# Patient Record
Sex: MALE | Race: BLACK OR AFRICAN AMERICAN | NOT HISPANIC OR LATINO | Employment: UNEMPLOYED | URBAN - METROPOLITAN AREA
[De-identification: names, ages, dates, MRNs, and addresses within clinical notes are randomized per-mention and may not be internally consistent; named-entity substitution may affect disease eponyms.]

---

## 2017-12-17 ENCOUNTER — HOSPITAL ENCOUNTER (EMERGENCY)
Facility: HOSPITAL | Age: 27
Discharge: HOME | End: 2017-12-17
Attending: EMERGENCY MEDICINE
Payer: MEDICAID

## 2017-12-17 VITALS
OXYGEN SATURATION: 97 % | WEIGHT: 115 LBS | HEIGHT: 68 IN | RESPIRATION RATE: 16 BRPM | HEART RATE: 101 BPM | TEMPERATURE: 98 F | BODY MASS INDEX: 17.43 KG/M2 | DIASTOLIC BLOOD PRESSURE: 67 MMHG | SYSTOLIC BLOOD PRESSURE: 110 MMHG

## 2017-12-17 DIAGNOSIS — R06.02 SHORTNESS OF BREATH: ICD-10-CM

## 2017-12-17 DIAGNOSIS — J45.901 EXACERBATION OF ASTHMA, UNSPECIFIED ASTHMA SEVERITY, UNSPECIFIED WHETHER PERSISTENT: Primary | ICD-10-CM

## 2017-12-17 LAB
ALBUMIN SERPL BCP-MCNC: 4.2 G/DL
ALP SERPL-CCNC: 72 U/L
ALT SERPL W/O P-5'-P-CCNC: 16 U/L
ANION GAP SERPL CALC-SCNC: 14 MMOL/L
AST SERPL-CCNC: 23 U/L
BASOPHILS # BLD AUTO: 0.05 K/UL
BASOPHILS NFR BLD: 0.4 %
BILIRUB SERPL-MCNC: 0.4 MG/DL
BNP SERPL-MCNC: 43 PG/ML
BUN SERPL-MCNC: 13 MG/DL
CALCIUM SERPL-MCNC: 9.2 MG/DL
CHLORIDE SERPL-SCNC: 107 MMOL/L
CO2 SERPL-SCNC: 20 MMOL/L
CREAT SERPL-MCNC: 1 MG/DL
DIFFERENTIAL METHOD: ABNORMAL
EOSINOPHIL # BLD AUTO: 0 K/UL
EOSINOPHIL NFR BLD: 0.1 %
ERYTHROCYTE [DISTWIDTH] IN BLOOD BY AUTOMATED COUNT: 12.2 %
EST. GFR  (AFRICAN AMERICAN): >60 ML/MIN/1.73 M^2
EST. GFR  (NON AFRICAN AMERICAN): >60 ML/MIN/1.73 M^2
GLUCOSE SERPL-MCNC: 108 MG/DL
HCT VFR BLD AUTO: 44 %
HGB BLD-MCNC: 15.1 G/DL
IMM GRANULOCYTES # BLD AUTO: 0.04 K/UL
IMM GRANULOCYTES NFR BLD AUTO: 0.4 %
LYMPHOCYTES # BLD AUTO: 0.3 K/UL
LYMPHOCYTES NFR BLD: 2.9 %
MCH RBC QN AUTO: 30.7 PG
MCHC RBC AUTO-ENTMCNC: 34.3 G/DL
MCV RBC AUTO: 89 FL
MONOCYTES # BLD AUTO: 0.7 K/UL
MONOCYTES NFR BLD: 6 %
NEUTROPHILS # BLD AUTO: 10.1 K/UL
NEUTROPHILS NFR BLD: 90.2 %
NRBC BLD-RTO: 0 /100 WBC
PLATELET # BLD AUTO: 198 K/UL
PMV BLD AUTO: 10.4 FL
POTASSIUM SERPL-SCNC: 3.5 MMOL/L
PROT SERPL-MCNC: 7.6 G/DL
RBC # BLD AUTO: 4.92 M/UL
SODIUM SERPL-SCNC: 141 MMOL/L
TROPONIN I SERPL DL<=0.01 NG/ML-MCNC: <0.006 NG/ML
WBC # BLD AUTO: 11.22 K/UL

## 2017-12-17 PROCEDURE — 93005 ELECTROCARDIOGRAM TRACING: CPT

## 2017-12-17 PROCEDURE — 94640 AIRWAY INHALATION TREATMENT: CPT

## 2017-12-17 PROCEDURE — 80053 COMPREHEN METABOLIC PANEL: CPT

## 2017-12-17 PROCEDURE — 83880 ASSAY OF NATRIURETIC PEPTIDE: CPT

## 2017-12-17 PROCEDURE — 25000003 PHARM REV CODE 250: Performed by: PHYSICIAN ASSISTANT

## 2017-12-17 PROCEDURE — 84484 ASSAY OF TROPONIN QUANT: CPT

## 2017-12-17 PROCEDURE — 99284 EMERGENCY DEPT VISIT MOD MDM: CPT | Mod: 25

## 2017-12-17 PROCEDURE — 96360 HYDRATION IV INFUSION INIT: CPT

## 2017-12-17 PROCEDURE — 99285 EMERGENCY DEPT VISIT HI MDM: CPT | Mod: ,,, | Performed by: EMERGENCY MEDICINE

## 2017-12-17 PROCEDURE — 63600175 PHARM REV CODE 636 W HCPCS: Performed by: PHYSICIAN ASSISTANT

## 2017-12-17 PROCEDURE — 25000242 PHARM REV CODE 250 ALT 637 W/ HCPCS: Performed by: PHYSICIAN ASSISTANT

## 2017-12-17 PROCEDURE — 93010 ELECTROCARDIOGRAM REPORT: CPT | Mod: ,,, | Performed by: INTERNAL MEDICINE

## 2017-12-17 PROCEDURE — 85025 COMPLETE CBC W/AUTO DIFF WBC: CPT

## 2017-12-17 RX ORDER — IPRATROPIUM BROMIDE AND ALBUTEROL SULFATE 2.5; .5 MG/3ML; MG/3ML
3 SOLUTION RESPIRATORY (INHALATION)
Status: DISCONTINUED | OUTPATIENT
Start: 2017-12-17 | End: 2017-12-17

## 2017-12-17 RX ORDER — IPRATROPIUM BROMIDE AND ALBUTEROL SULFATE 2.5; .5 MG/3ML; MG/3ML
3 SOLUTION RESPIRATORY (INHALATION)
Status: COMPLETED | OUTPATIENT
Start: 2017-12-17 | End: 2017-12-17

## 2017-12-17 RX ORDER — ALBUTEROL SULFATE 90 UG/1
1-2 AEROSOL, METERED RESPIRATORY (INHALATION) EVERY 6 HOURS PRN
Qty: 1 INHALER | Refills: 0 | Status: SHIPPED | OUTPATIENT
Start: 2017-12-17 | End: 2018-03-05 | Stop reason: SDUPTHER

## 2017-12-17 RX ORDER — PREDNISONE 20 MG/1
40 TABLET ORAL DAILY
Qty: 6 TABLET | Refills: 0 | Status: SHIPPED | OUTPATIENT
Start: 2017-12-17 | End: 2017-12-20

## 2017-12-17 RX ORDER — PREDNISONE 20 MG/1
60 TABLET ORAL
Status: COMPLETED | OUTPATIENT
Start: 2017-12-17 | End: 2017-12-17

## 2017-12-17 RX ADMIN — PREDNISONE 60 MG: 20 TABLET ORAL at 05:12

## 2017-12-17 RX ADMIN — IPRATROPIUM BROMIDE AND ALBUTEROL SULFATE 3 ML: .5; 3 SOLUTION RESPIRATORY (INHALATION) at 03:12

## 2017-12-17 RX ADMIN — SODIUM CHLORIDE 1000 ML: 0.9 INJECTION, SOLUTION INTRAVENOUS at 05:12

## 2017-12-17 NOTE — ED NOTES
Treatment completed--insp/exp wheezes auscultated bilaterally--ST on monitor--denies pain and increased SOB--will monitor

## 2017-12-17 NOTE — ED PROVIDER NOTES
Encounter Date: 12/17/2017    SCRIBE #1 NOTE: I, Alok Salazar, am scribing for, and in the presence of,  Dr. Lucas. I have scribed the following portions of the note - the APC attestation.       History     Chief Complaint   Patient presents with    Shortness of Breath     hx of asthma      Patient is a 27 year old male with PMHx of asthma. He presents to the ED for SOB. He reports sudden onset SOB approximately 1.5 hours ago, while at rest watching the [employer] game. Reports associated subject fever and chest tightness. Reports symptoms relieved with leaning forward while breathing. Denies recent steroid use, hx of hospitalizations, or hx of intubations. Reports similar episode occurring 6 months ago that resolved on own. He denies fever,chills, nausea, vomiting, abd pain, dysuria, diarrhea, or constipation. He is a current everyday smoker and reports alcohol use. He denies recreational drug use.             Review of patient's allergies indicates:  No Known Allergies  Past Medical History:   Diagnosis Date    Asthma      History reviewed. No pertinent surgical history.  No family history on file.  Social History   Substance Use Topics    Smoking status: Not on file    Smokeless tobacco: Not on file    Alcohol use Not on file     Review of Systems   Constitutional: Positive for fever (subjective).   HENT: Negative for sore throat.    Respiratory: Positive for chest tightness, shortness of breath and wheezing.    Gastrointestinal: Negative for nausea.   Genitourinary: Negative for dysuria.   Musculoskeletal: Negative for back pain.   Skin: Negative for rash.   Neurological: Negative for weakness.   Hematological: Does not bruise/bleed easily.       Physical Exam     Initial Vitals [12/17/17 1502]   BP Pulse Resp Temp SpO2   121/73 101 18 99.8 °F (37.7 °C) (!) 93 %      MAP       89         Physical Exam    Vitals reviewed.  Constitutional: He appears well-developed and well-nourished. He appears distressed  (mild respiratory).   HENT:   Head: Normocephalic and atraumatic.   Nose: Nose normal.   Eyes: Conjunctivae and EOM are normal. Pupils are equal, round, and reactive to light.   Neck: Normal range of motion.   Cardiovascular: Normal rate and regular rhythm. Exam reveals no friction rub.    No murmur heard.  Pulmonary/Chest: Accessory muscle usage present. He is in respiratory distress (mild). He has wheezes. He has no rales.   Abdominal: Soft. Bowel sounds are normal. He exhibits no distension. There is no tenderness.   Musculoskeletal: Normal range of motion. He exhibits no edema or tenderness.   B/l lower extremities symmetric in size.    Neurological: He is alert and oriented to person, place, and time. He has normal strength. No sensory deficit.   Skin: Skin is warm and dry. No erythema.   Psychiatric: He has a normal mood and affect. Thought content normal.         ED Course   Procedures  Labs Reviewed   CBC W/ AUTO DIFFERENTIAL - Abnormal; Notable for the following:        Result Value    Gran # 10.1 (*)     Lymph # 0.3 (*)     Gran% 90.2 (*)     Lymph% 2.9 (*)     All other components within normal limits    Narrative:     sherrygabriele shared   COMPREHENSIVE METABOLIC PANEL - Abnormal; Notable for the following:     CO2 20 (*)     All other components within normal limits    Narrative:     lavender shared   B-TYPE NATRIURETIC PEPTIDE    Narrative:     lavender shared   TROPONIN I    Narrative:     lavender shared        Imaging Results          X-Ray Chest PA And Lateral (Final result)  Result time 12/17/17 17:04:59    Final result by Gladis Bruner MD (12/17/17 17:04:59)                 Impression:         Bandlike linear attenuation projected over the right lower lung zone, may reflect nonspecific atelectasis or scarring however developing consolidation is not excluded, in the absence of previous exams for comparison.  Correlation recommended..          Electronically signed by: GLADIS BRUNER MD  Date:      12/17/17  Time:    17:04              Narrative:    Chest PA and lateral    Indication:Asthma    Comparison:None    Findings:  The cardiomediastinal silhouette is not enlarged.  There is no pleural effusion.  The trachea is midline.  The lungs are symmetrically expanded bilaterally with linear increased parenchymal attenuation projected over the right midlung zone, could reflect nonspecific vascular crowding related to previous infection however developing consolidation is not excluded, no exams are available for comparison. No large focal consolidation seen.  There is no pneumothorax.  The osseous structures are unremarkable.                                 Medical Decision Making:   History:   Old Medical Records: I decided to obtain old medical records.  Clinical Tests:   Lab Tests: Ordered and Reviewed  Radiological Study: Ordered and Reviewed  Medical Tests: Reviewed and Ordered       APC / Resident Notes:   Patient is a 27 year old male with PMHx of asthma. He presents to the ED for SOB. Patient is in mild respiratory distress. Vitals stable. AAOx3. RRR. Lungs wheezing. Abdomen is soft, non tender, non distended. Skin is normal appearance without rashes.     Will order labs, imaging, EKG, and serial nebulizer treatments. Will continue to monitor.     Differential diagnoses include, but are not limited to: asthma exacerbation, cardiac arrhythmia, electrolyte imbalance, or URI. I considered but do not suspect pulmonary embolism or ACS.     Labs unremarkable for infectious process. Troponin WNL. BNP WNL. CXR found to have Bandlike linear attenuation projected over the right lower lung zone, may reflect nonspecific atelectasis or scarring however developing consolidation is not excluded. Patient reexamined, lungs CTA b/l. Patient reassessed, reports symptoms improved with ED management. Will discharge home with steroids and albuterol inhaler and F/U with PCP.     I have discussed and reviewed with my  supervising physician.           Scribe Attestation:   Scribe #1: I performed the above scribed service and the documentation accurately describes the services I performed. I attest to the accuracy of the note.    Attending Attestation:     Physician Attestation Statement for NP/PA:   I have conducted a face to face encounter with this patient in addition to the NP/PA, due to Medical Complexity    Other NP/PA Attestation Additions:    History of Present Illness: 26 yo male presents with SOB and chest tightness. Patient states he had a little bit of a cold lately. No cough or fever   Physical Exam: Diffuse wheezing bilaterally. No lower extremity swelling.    Medical Decision Making: Acute asthma excerebration. Will give steroids, basic blood work, and breathing treatments. Will do chest x-ray and reassess.                  ED Course      Clinical Impression:   The primary encounter diagnosis was Exacerbation of asthma, unspecified asthma severity, unspecified whether persistent. A diagnosis of Shortness of breath was also pertinent to this visit.    Disposition:   Disposition: Discharged  Condition: Stable                        Fadia Dougherty PA-C  12/17/17 2011

## 2017-12-17 NOTE — ED NOTES
Pt to er c/o SOB and back pain--speaking clealry--reports subjective fever--reports productive cough

## 2018-02-25 ENCOUNTER — HOSPITAL ENCOUNTER (INPATIENT)
Facility: HOSPITAL | Age: 28
LOS: 7 days | Discharge: HOME | DRG: 208 | End: 2018-03-04
Attending: EMERGENCY MEDICINE | Admitting: INTERNAL MEDICINE
Payer: MEDICAID

## 2018-02-25 DIAGNOSIS — R00.1 BRADYCARDIA: ICD-10-CM

## 2018-02-25 DIAGNOSIS — J96.01 ACUTE RESPIRATORY FAILURE WITH HYPOXIA AND HYPERCAPNIA: ICD-10-CM

## 2018-02-25 DIAGNOSIS — J45.52 SEVERE PERSISTENT ASTHMA WITH STATUS ASTHMATICUS: ICD-10-CM

## 2018-02-25 DIAGNOSIS — R06.00 ACUTE DYSPNEA: ICD-10-CM

## 2018-02-25 DIAGNOSIS — J96.90 RESPIRATORY FAILURE, UNSPECIFIED CHRONICITY, UNSPECIFIED WHETHER WITH HYPOXIA OR HYPERCAPNIA: Primary | ICD-10-CM

## 2018-02-25 DIAGNOSIS — R00.0 TACHYCARDIA: ICD-10-CM

## 2018-02-25 DIAGNOSIS — J45.901: ICD-10-CM

## 2018-02-25 DIAGNOSIS — J96.02 ACUTE RESPIRATORY FAILURE WITH HYPOXIA AND HYPERCAPNIA: ICD-10-CM

## 2018-02-25 DIAGNOSIS — J45.51 SEVERE PERSISTENT ASTHMA WITH EXACERBATION: ICD-10-CM

## 2018-02-25 DIAGNOSIS — G93.41 ENCEPHALOPATHY, METABOLIC: ICD-10-CM

## 2018-02-25 LAB
ALBUMIN SERPL BCP-MCNC: 4.2 G/DL
ALLENS TEST: ABNORMAL
ALP SERPL-CCNC: 76 U/L
ALT SERPL W/O P-5'-P-CCNC: 22 U/L
AMPHET+METHAMPHET UR QL: NEGATIVE
ANION GAP SERPL CALC-SCNC: 17 MMOL/L
AST SERPL-CCNC: 32 U/L
BARBITURATES UR QL SCN>200 NG/ML: NEGATIVE
BASOPHILS # BLD AUTO: 0.06 K/UL
BASOPHILS NFR BLD: 0.3 %
BENZODIAZ UR QL SCN>200 NG/ML: NEGATIVE
BILIRUB SERPL-MCNC: 0.3 MG/DL
BUN SERPL-MCNC: 19 MG/DL
BUN SERPL-MCNC: 20 MG/DL (ref 6–30)
BZE UR QL SCN: NEGATIVE
CALCIUM SERPL-MCNC: 9.3 MG/DL
CANNABINOIDS UR QL SCN: NORMAL
CHLORIDE SERPL-SCNC: 105 MMOL/L
CHLORIDE SERPL-SCNC: 105 MMOL/L (ref 95–110)
CO2 SERPL-SCNC: 17 MMOL/L
CREAT SERPL-MCNC: 0.9 MG/DL (ref 0.5–1.4)
CREAT SERPL-MCNC: 1.2 MG/DL
CREAT UR-MCNC: 50 MG/DL
DELSYS: ABNORMAL
DIFFERENTIAL METHOD: ABNORMAL
EOSINOPHIL # BLD AUTO: 0.1 K/UL
EOSINOPHIL NFR BLD: 0.6 %
ERYTHROCYTE [DISTWIDTH] IN BLOOD BY AUTOMATED COUNT: 12.4 %
ERYTHROCYTE [SEDIMENTATION RATE] IN BLOOD BY WESTERGREN METHOD: 14 MM/H
ERYTHROCYTE [SEDIMENTATION RATE] IN BLOOD BY WESTERGREN METHOD: 14 MM/H
ERYTHROCYTE [SEDIMENTATION RATE] IN BLOOD BY WESTERGREN METHOD: 20 MM/H
EST. GFR  (AFRICAN AMERICAN): >60 ML/MIN/1.73 M^2
EST. GFR  (NON AFRICAN AMERICAN): >60 ML/MIN/1.73 M^2
FIO2: 30
FIO2: 40
FIO2: 50
GLUCOSE SERPL-MCNC: 157 MG/DL (ref 70–110)
GLUCOSE SERPL-MCNC: 208 MG/DL
HCO3 UR-SCNC: 20.5 MMOL/L (ref 24–28)
HCO3 UR-SCNC: 23.4 MMOL/L (ref 24–28)
HCO3 UR-SCNC: 23.6 MMOL/L (ref 24–28)
HCO3 UR-SCNC: 24.2 MMOL/L (ref 24–28)
HCT VFR BLD AUTO: 48.2 %
HCT VFR BLD CALC: 43 %PCV (ref 36–54)
HCT VFR BLD CALC: 44 %PCV (ref 36–54)
HGB BLD-MCNC: 15.7 G/DL
IMM GRANULOCYTES # BLD AUTO: 0.19 K/UL
IMM GRANULOCYTES NFR BLD AUTO: 1 %
INR PPP: 1.1
LACTATE SERPL-SCNC: 3.8 MMOL/L
LYMPHOCYTES # BLD AUTO: 2.4 K/UL
LYMPHOCYTES NFR BLD: 12.9 %
MAGNESIUM SERPL-MCNC: 2.3 MG/DL
MCH RBC QN AUTO: 31 PG
MCHC RBC AUTO-ENTMCNC: 32.6 G/DL
MCV RBC AUTO: 95 FL
METHADONE UR QL SCN>300 NG/ML: NEGATIVE
MIN VOL: 5.54
MIN VOL: 6.92
MIN VOL: 8.4
MODE: ABNORMAL
MONOCYTES # BLD AUTO: 1.1 K/UL
MONOCYTES NFR BLD: 5.8 %
NEUTROPHILS # BLD AUTO: 15 K/UL
NEUTROPHILS NFR BLD: 79.4 %
NRBC BLD-RTO: 0 /100 WBC
OPIATES UR QL SCN: NEGATIVE
PCO2 BLDA: 56.2 MMHG (ref 35–45)
PCO2 BLDA: 59.7 MMHG (ref 35–45)
PCO2 BLDA: 81.2 MMHG (ref 35–45)
PCO2 BLDA: 81.4 MMHG (ref 35–45)
PCP UR QL SCN>25 NG/ML: NEGATIVE
PEEP: 5
PH SMN: 7.07 [PH] (ref 7.35–7.45)
PH SMN: 7.08 [PH] (ref 7.35–7.45)
PH SMN: 7.17 [PH] (ref 7.35–7.45)
PH SMN: 7.2 [PH] (ref 7.35–7.45)
PHOSPHATE SERPL-MCNC: 7.6 MG/DL
PIP: 23
PIP: 28
PIP: 31
PLATELET # BLD AUTO: 286 K/UL
PMV BLD AUTO: 9.8 FL
PO2 BLDA: 107 MMHG (ref 80–100)
PO2 BLDA: 130 MMHG (ref 80–100)
PO2 BLDA: 55 MMHG (ref 40–60)
PO2 BLDA: 72 MMHG (ref 80–100)
POC BE: -5 MMOL/L
POC BE: -6 MMOL/L
POC BE: -7 MMOL/L
POC BE: -8 MMOL/L
POC IONIZED CALCIUM: 1.19 MMOL/L (ref 1.06–1.42)
POC IONIZED CALCIUM: 1.21 MMOL/L (ref 1.06–1.42)
POC SATURATED O2: 73 % (ref 95–100)
POC SATURATED O2: 89 % (ref 95–100)
POC SATURATED O2: 95 % (ref 95–100)
POC SATURATED O2: 98 % (ref 95–100)
POC TCO2 (MEASURED): 25 MMOL/L (ref 23–29)
POC TCO2: 22 MMOL/L (ref 23–27)
POC TCO2: 25 MMOL/L (ref 23–27)
POC TCO2: 26 MMOL/L (ref 24–29)
POC TCO2: 27 MMOL/L (ref 23–27)
POCT GLUCOSE: 155 MG/DL (ref 70–110)
POTASSIUM BLD-SCNC: 4.3 MMOL/L (ref 3.5–5.1)
POTASSIUM BLD-SCNC: 4.3 MMOL/L (ref 3.5–5.1)
POTASSIUM SERPL-SCNC: 4.7 MMOL/L
PROT SERPL-MCNC: 7.8 G/DL
PROTHROMBIN TIME: 11.1 SEC
PROVIDER CREDENTIALS: ABNORMAL
PROVIDER NOTIFIED: ABNORMAL
RBC # BLD AUTO: 5.06 M/UL
SAMPLE: ABNORMAL
SITE: ABNORMAL
SODIUM BLD-SCNC: 138 MMOL/L (ref 136–145)
SODIUM BLD-SCNC: 140 MMOL/L (ref 136–145)
SODIUM SERPL-SCNC: 139 MMOL/L
SP02: 100
SP02: 100
SP02: 92
TIME NOTIFIED: 1723
TOXICOLOGY INFORMATION: NORMAL
TROPONIN I SERPL DL<=0.01 NG/ML-MCNC: 0.03 NG/ML
VERBAL RESULT READBACK PERFORMED: YES
VT: 300
VT: 380
VT: 380
WBC # BLD AUTO: 18.86 K/UL

## 2018-02-25 PROCEDURE — 94640 AIRWAY INHALATION TREATMENT: CPT

## 2018-02-25 PROCEDURE — 80307 DRUG TEST PRSMV CHEM ANLYZR: CPT

## 2018-02-25 PROCEDURE — 25000003 PHARM REV CODE 250: Performed by: STUDENT IN AN ORGANIZED HEALTH CARE EDUCATION/TRAINING PROGRAM

## 2018-02-25 PROCEDURE — 84295 ASSAY OF SERUM SODIUM: CPT

## 2018-02-25 PROCEDURE — 25000003 PHARM REV CODE 250: Performed by: NURSE PRACTITIONER

## 2018-02-25 PROCEDURE — 5A1945Z RESPIRATORY VENTILATION, 24-96 CONSECUTIVE HOURS: ICD-10-PCS | Performed by: EMERGENCY MEDICINE

## 2018-02-25 PROCEDURE — 0BH17EZ INSERTION OF ENDOTRACHEAL AIRWAY INTO TRACHEA, VIA NATURAL OR ARTIFICIAL OPENING: ICD-10-PCS | Performed by: EMERGENCY MEDICINE

## 2018-02-25 PROCEDURE — 99499 UNLISTED E&M SERVICE: CPT | Mod: ,,, | Performed by: EMERGENCY MEDICINE

## 2018-02-25 PROCEDURE — 83735 ASSAY OF MAGNESIUM: CPT

## 2018-02-25 PROCEDURE — 80053 COMPREHEN METABOLIC PANEL: CPT

## 2018-02-25 PROCEDURE — 20000000 HC ICU ROOM

## 2018-02-25 PROCEDURE — 99291 CRITICAL CARE FIRST HOUR: CPT | Mod: 25,,, | Performed by: NURSE PRACTITIONER

## 2018-02-25 PROCEDURE — 96366 THER/PROPH/DIAG IV INF ADDON: CPT

## 2018-02-25 PROCEDURE — 99292 CRITICAL CARE ADDL 30 MIN: CPT | Mod: 25,,, | Performed by: INTERNAL MEDICINE

## 2018-02-25 PROCEDURE — 63600175 PHARM REV CODE 636 W HCPCS: Performed by: NURSE PRACTITIONER

## 2018-02-25 PROCEDURE — 84484 ASSAY OF TROPONIN QUANT: CPT

## 2018-02-25 PROCEDURE — 87205 SMEAR GRAM STAIN: CPT

## 2018-02-25 PROCEDURE — 37799 UNLISTED PX VASCULAR SURGERY: CPT

## 2018-02-25 PROCEDURE — 25000242 PHARM REV CODE 250 ALT 637 W/ HCPCS: Performed by: NURSE PRACTITIONER

## 2018-02-25 PROCEDURE — A4216 STERILE WATER/SALINE, 10 ML: HCPCS | Performed by: NURSE PRACTITIONER

## 2018-02-25 PROCEDURE — 36600 WITHDRAWAL OF ARTERIAL BLOOD: CPT

## 2018-02-25 PROCEDURE — 25000003 PHARM REV CODE 250: Performed by: EMERGENCY MEDICINE

## 2018-02-25 PROCEDURE — 99900026 HC AIRWAY MAINTENANCE (STAT)

## 2018-02-25 PROCEDURE — 99900035 HC TECH TIME PER 15 MIN (STAT)

## 2018-02-25 PROCEDURE — 84132 ASSAY OF SERUM POTASSIUM: CPT

## 2018-02-25 PROCEDURE — 94761 N-INVAS EAR/PLS OXIMETRY MLT: CPT

## 2018-02-25 PROCEDURE — 96365 THER/PROPH/DIAG IV INF INIT: CPT

## 2018-02-25 PROCEDURE — 82330 ASSAY OF CALCIUM: CPT

## 2018-02-25 PROCEDURE — 85014 HEMATOCRIT: CPT

## 2018-02-25 PROCEDURE — 63600175 PHARM REV CODE 636 W HCPCS

## 2018-02-25 PROCEDURE — 82803 BLOOD GASES ANY COMBINATION: CPT

## 2018-02-25 PROCEDURE — 93010 ELECTROCARDIOGRAM REPORT: CPT | Mod: ,,, | Performed by: INTERNAL MEDICINE

## 2018-02-25 PROCEDURE — 93005 ELECTROCARDIOGRAM TRACING: CPT

## 2018-02-25 PROCEDURE — 84100 ASSAY OF PHOSPHORUS: CPT

## 2018-02-25 PROCEDURE — 96372 THER/PROPH/DIAG INJ SC/IM: CPT

## 2018-02-25 PROCEDURE — 96375 TX/PRO/DX INJ NEW DRUG ADDON: CPT

## 2018-02-25 PROCEDURE — 87070 CULTURE OTHR SPECIMN AEROBIC: CPT

## 2018-02-25 PROCEDURE — 25000003 PHARM REV CODE 250

## 2018-02-25 PROCEDURE — 85025 COMPLETE CBC W/AUTO DIFF WBC: CPT

## 2018-02-25 PROCEDURE — 80069 RENAL FUNCTION PANEL: CPT

## 2018-02-25 PROCEDURE — 94002 VENT MGMT INPAT INIT DAY: CPT

## 2018-02-25 PROCEDURE — 51702 INSERT TEMP BLADDER CATH: CPT

## 2018-02-25 PROCEDURE — 99291 CRITICAL CARE FIRST HOUR: CPT | Mod: 25

## 2018-02-25 PROCEDURE — 87632 RESP VIRUS 6-11 TARGETS: CPT

## 2018-02-25 PROCEDURE — 87040 BLOOD CULTURE FOR BACTERIA: CPT | Mod: 59

## 2018-02-25 PROCEDURE — 31500 INSERT EMERGENCY AIRWAY: CPT

## 2018-02-25 PROCEDURE — 27000221 HC OXYGEN, UP TO 24 HOURS

## 2018-02-25 PROCEDURE — 84145 PROCALCITONIN (PCT): CPT

## 2018-02-25 PROCEDURE — 83605 ASSAY OF LACTIC ACID: CPT

## 2018-02-25 PROCEDURE — 85610 PROTHROMBIN TIME: CPT

## 2018-02-25 PROCEDURE — 63600175 PHARM REV CODE 636 W HCPCS: Performed by: EMERGENCY MEDICINE

## 2018-02-25 PROCEDURE — 27200966 HC CLOSED SUCTION SYSTEM

## 2018-02-25 PROCEDURE — 36620 INSERTION CATHETER ARTERY: CPT | Mod: ,,, | Performed by: NURSE PRACTITIONER

## 2018-02-25 RX ORDER — PROPOFOL 10 MG/ML
5 INJECTION, EMULSION INTRAVENOUS CONTINUOUS
Status: DISCONTINUED | OUTPATIENT
Start: 2018-02-25 | End: 2018-03-01

## 2018-02-25 RX ORDER — FENTANYL CITRATE-0.9 % NACL/PF 10 MCG/ML
PLASTIC BAG, INJECTION (ML) INTRAVENOUS CONTINUOUS
Status: DISCONTINUED | OUTPATIENT
Start: 2018-02-25 | End: 2018-02-26

## 2018-02-25 RX ORDER — LORAZEPAM 2 MG/ML
1 INJECTION INTRAMUSCULAR
Status: COMPLETED | OUTPATIENT
Start: 2018-02-25 | End: 2018-02-25

## 2018-02-25 RX ORDER — FENTANYL CITRATE 50 UG/ML
100 INJECTION, SOLUTION INTRAMUSCULAR; INTRAVENOUS
Status: COMPLETED | OUTPATIENT
Start: 2018-02-25 | End: 2018-02-25

## 2018-02-25 RX ORDER — FENTANYL CITRATE 50 UG/ML
INJECTION, SOLUTION INTRAMUSCULAR; INTRAVENOUS
Status: COMPLETED
Start: 2018-02-25 | End: 2018-02-25

## 2018-02-25 RX ORDER — SUCCINYLCHOLINE CHLORIDE 20 MG/ML
70 INJECTION INTRAMUSCULAR; INTRAVENOUS
Status: COMPLETED | OUTPATIENT
Start: 2018-02-25 | End: 2018-02-25

## 2018-02-25 RX ORDER — SODIUM CHLORIDE 0.9 % (FLUSH) 0.9 %
3 SYRINGE (ML) INJECTION EVERY 8 HOURS
Status: DISCONTINUED | OUTPATIENT
Start: 2018-02-25 | End: 2018-03-04 | Stop reason: HOSPADM

## 2018-02-25 RX ORDER — METHYLPREDNISOLONE SOD SUCC 125 MG
80 VIAL (EA) INJECTION DAILY
Status: DISCONTINUED | OUTPATIENT
Start: 2018-02-26 | End: 2018-02-26

## 2018-02-25 RX ORDER — NOREPINEPHRINE BITARTRATE/D5W 4MG/250ML
0.05 PLASTIC BAG, INJECTION (ML) INTRAVENOUS CONTINUOUS
Status: DISCONTINUED | OUTPATIENT
Start: 2018-02-25 | End: 2018-02-26

## 2018-02-25 RX ORDER — ETOMIDATE 2 MG/ML
INJECTION INTRAVENOUS
Status: COMPLETED
Start: 2018-02-25 | End: 2018-02-25

## 2018-02-25 RX ORDER — SUCCINYLCHOLINE CHLORIDE 20 MG/ML
100 INJECTION INTRAMUSCULAR; INTRAVENOUS
Status: DISCONTINUED | OUTPATIENT
Start: 2018-02-25 | End: 2018-02-25 | Stop reason: SDUPTHER

## 2018-02-25 RX ORDER — MAGNESIUM SULFATE/D5W 2 G/50 ML
2 INTRAVENOUS SOLUTION, PIGGYBACK (ML) INTRAVENOUS
Status: DISCONTINUED | OUTPATIENT
Start: 2018-02-25 | End: 2018-02-25 | Stop reason: SDUPTHER

## 2018-02-25 RX ORDER — FENTANYL CITRATE 50 UG/ML
100 INJECTION, SOLUTION INTRAMUSCULAR; INTRAVENOUS ONCE
Status: COMPLETED | OUTPATIENT
Start: 2018-02-25 | End: 2018-02-25

## 2018-02-25 RX ORDER — METHYLPREDNISOLONE SOD SUCC 125 MG
125 VIAL (EA) INJECTION
Status: COMPLETED | OUTPATIENT
Start: 2018-02-25 | End: 2018-02-25

## 2018-02-25 RX ORDER — CEFTRIAXONE 1 G/1
1 INJECTION, POWDER, FOR SOLUTION INTRAMUSCULAR; INTRAVENOUS
Status: DISCONTINUED | OUTPATIENT
Start: 2018-02-25 | End: 2018-02-26

## 2018-02-25 RX ORDER — IPRATROPIUM BROMIDE AND ALBUTEROL SULFATE 2.5; .5 MG/3ML; MG/3ML
3 SOLUTION RESPIRATORY (INHALATION) EVERY 4 HOURS
Status: DISCONTINUED | OUTPATIENT
Start: 2018-02-25 | End: 2018-02-27

## 2018-02-25 RX ORDER — HEPARIN SODIUM 5000 [USP'U]/ML
5000 INJECTION, SOLUTION INTRAVENOUS; SUBCUTANEOUS EVERY 8 HOURS
Status: DISCONTINUED | OUTPATIENT
Start: 2018-02-25 | End: 2018-02-26

## 2018-02-25 RX ORDER — ALBUTEROL SULFATE 5 MG/ML
10 SOLUTION RESPIRATORY (INHALATION) ONCE
Status: COMPLETED | OUTPATIENT
Start: 2018-02-25 | End: 2018-02-25

## 2018-02-25 RX ORDER — MAGNESIUM SULFATE HEPTAHYDRATE 40 MG/ML
2 INJECTION, SOLUTION INTRAVENOUS
Status: COMPLETED | OUTPATIENT
Start: 2018-02-25 | End: 2018-02-25

## 2018-02-25 RX ORDER — ETOMIDATE 2 MG/ML
15 INJECTION INTRAVENOUS
Status: COMPLETED | OUTPATIENT
Start: 2018-02-25 | End: 2018-02-25

## 2018-02-25 RX ORDER — LORAZEPAM 2 MG/ML
1 INJECTION INTRAMUSCULAR
Status: DISCONTINUED | OUTPATIENT
Start: 2018-02-25 | End: 2018-02-25

## 2018-02-25 RX ORDER — SUCCINYLCHOLINE CHLORIDE 20 MG/ML
INJECTION INTRAMUSCULAR; INTRAVENOUS
Status: COMPLETED
Start: 2018-02-25 | End: 2018-02-25

## 2018-02-25 RX ORDER — CHLORHEXIDINE GLUCONATE ORAL RINSE 1.2 MG/ML
15 SOLUTION DENTAL 2 TIMES DAILY
Status: DISCONTINUED | OUTPATIENT
Start: 2018-02-25 | End: 2018-03-01

## 2018-02-25 RX ADMIN — MINERAL OIL AND WHITE PETROLATUM: 150; 830 OINTMENT OPHTHALMIC at 09:02

## 2018-02-25 RX ADMIN — LORAZEPAM 1 MG: 2 INJECTION, SOLUTION INTRAMUSCULAR; INTRAVENOUS at 04:02

## 2018-02-25 RX ADMIN — ETOMIDATE 40 MG: 2 INJECTION, SOLUTION INTRAVENOUS at 04:02

## 2018-02-25 RX ADMIN — METHYLPREDNISOLONE SODIUM SUCCINATE 125 MG: 125 INJECTION, POWDER, FOR SOLUTION INTRAMUSCULAR; INTRAVENOUS at 04:02

## 2018-02-25 RX ADMIN — CEFTRIAXONE SODIUM 1 G: 1 INJECTION, POWDER, FOR SOLUTION INTRAMUSCULAR; INTRAVENOUS at 09:02

## 2018-02-25 RX ADMIN — PROPOFOL 5 MCG/KG/MIN: 10 INJECTION, EMULSION INTRAVENOUS at 04:02

## 2018-02-25 RX ADMIN — Medication 3 ML: at 10:02

## 2018-02-25 RX ADMIN — FENTANYL CITRATE 100 MCG: 50 INJECTION, SOLUTION INTRAMUSCULAR; INTRAVENOUS at 05:02

## 2018-02-25 RX ADMIN — CISATRACURIUM BESYLATE 3 MCG/KG/MIN: 10 INJECTION INTRAVENOUS at 09:02

## 2018-02-25 RX ADMIN — MAGNESIUM SULFATE HEPTAHYDRATE 2 G: 40 INJECTION, SOLUTION INTRAVENOUS at 04:02

## 2018-02-25 RX ADMIN — ETOMIDATE 40 MG: 2 INJECTION INTRAVENOUS at 04:02

## 2018-02-25 RX ADMIN — HEPARIN SODIUM 5000 UNITS: 5000 INJECTION, SOLUTION INTRAVENOUS; SUBCUTANEOUS at 09:02

## 2018-02-25 RX ADMIN — ALBUTEROL SULFATE 10 MG: 5 SOLUTION RESPIRATORY (INHALATION) at 06:02

## 2018-02-25 RX ADMIN — SUCCINYLCHOLINE CHLORIDE 20 MG: 20 INJECTION INTRAMUSCULAR; INTRAVENOUS at 04:02

## 2018-02-25 RX ADMIN — SODIUM CHLORIDE 1000 ML: 0.9 INJECTION, SOLUTION INTRAVENOUS at 04:02

## 2018-02-25 RX ADMIN — CHLORHEXIDINE GLUCONATE 15 ML: 1.2 RINSE ORAL at 09:02

## 2018-02-25 RX ADMIN — SODIUM CHLORIDE 1000 ML: 0.9 INJECTION, SOLUTION INTRAVENOUS at 10:02

## 2018-02-25 RX ADMIN — AZITHROMYCIN MONOHYDRATE 500 MG: 500 INJECTION, POWDER, LYOPHILIZED, FOR SOLUTION INTRAVENOUS at 09:02

## 2018-02-25 RX ADMIN — Medication 2500 MCG: at 05:02

## 2018-02-25 RX ADMIN — SUCCINYLCHOLINE CHLORIDE 70 MG: 20 INJECTION, SOLUTION INTRAMUSCULAR; INTRAVENOUS at 04:02

## 2018-02-25 RX ADMIN — IPRATROPIUM BROMIDE AND ALBUTEROL SULFATE 3 ML: .5; 3 SOLUTION RESPIRATORY (INHALATION) at 09:02

## 2018-02-25 RX ADMIN — SUCCINYLCHOLINE CHLORIDE 20 MG: 20 INJECTION, SOLUTION INTRAMUSCULAR; INTRAVENOUS at 04:02

## 2018-02-25 NOTE — ED NOTES
Pt brother, Luis A Garcia [phone], wishes to be contacted with information when pt is admitted.

## 2018-02-25 NOTE — ED PROVIDER NOTES
Encounter Date: 2/25/2018    SCRIBE #1 NOTE: I, Whitney Rahman, am scribing for, and in the presence of,  Dr. Paiz . I have scribed the entire note.       History     Chief Complaint   Patient presents with    Respiratory Distress     Pt found cyanotic per EMS.  Pt had asthma attack x 1 hour but ran out of inhaler.  Family called 911 because patient was not breathing and was less responsive.      Time seen by provider: 4:00 PM    This is a 27 y.o. male with medical problems including asthma who presents with complaint of acute respiratory distress. Pt ran out of his medication. EMS found him with respiratory distress and bagged him before arriving in the ED. EMS states he has agonal respirations. No IV access. No other available information at this time.       The history is provided by medical records and the EMS personnel. The history is limited by the condition of the patient.     Review of patient's allergies indicates:  No Known Allergies  Past Medical History:   Diagnosis Date    Asthma      No past surgical history on file.  No family history on file.  Social History   Substance Use Topics    Smoking status: Not on file    Smokeless tobacco: Not on file    Alcohol use Not on file     Review of Systems   Unable to perform ROS: Acuity of condition   Respiratory: Positive for shortness of breath.        Physical Exam     Initial Vitals [02/25/18 1603]   BP Pulse Resp Temp SpO2   (!) 147/95 97 (!) 30 -- 100 %      MAP       112.33         Physical Exam    Nursing note and vitals reviewed.  Constitutional: He appears well-developed and well-nourished. He is diaphoretic.   HENT:   Head: Normocephalic.   Mouth/Throat: Oropharynx is clear and moist.   Eyes: EOM are normal.   Neck: Normal range of motion.   Cardiovascular: Regular rhythm and normal heart sounds. Tachycardia present.    Pulmonary/Chest: He is in respiratory distress. He has wheezes (Inspiratory and expiatory ).   Abdominal: Soft. Bowel  sounds are normal. He exhibits no distension. There is no tenderness. There is no rebound and no guarding.   Musculoskeletal: Normal range of motion.   Neurological: He is alert and oriented to person, place, and time. He has normal strength.   Skin: Skin is warm. Capillary refill takes less than 2 seconds. No erythema. No pallor.         ED Course   Intubation  Date/Time: 2/25/2018 4:59 PM  Location procedure was performed: [facility] EMERGENCY DEPARTMENT  Performed by: AJ VAZ  Authorized by: FERNANDO CINTRON   Assisting provider: FERNANDO CINTRON  Pre-operative diagnosis: respiratory distress  Post-operative diagnosis: asthma exacerbation  Consent Done: Emergent Situation  Indications: respiratory distress  Description of findings: visualized cords   Intubation method: video-assisted  Patient status: paralyzed (RSI)  Preoxygenation: BVM  Sedatives: etomidate  Paralytic: succinylcholine  Laryngoscope size: Mac 4  Tube size: 7.5 mm  Tube type: cuffed  Number of attempts: 1  Cricoid pressure: yes  Cords visualized: yes  Post-procedure assessment: ETCO2 monitor and chest rise  Breath sounds: wheezing and diminished  Cuff inflated: yes  Tube secured with: ETT zamarripa  Chest x-ray interpreted by me.  Chest x-ray findings: endotracheal tube in appropriate position  Patient tolerance: Patient tolerated the procedure well with no immediate complications  Technical procedures used: none   Significant surgical tasks conducted by the assistant(s): none  Complications: No  Estimated blood loss (mL): 0  Specimens: No  Implants: No    Critical Care  Date/Time: 2/25/2018 5:19 PM  Performed by: FERNANDO CINTRON  Authorized by: FERNANDO CINTRON   Direct patient critical care time: 45 minutes  Total critical care time (exclusive of procedural time) : 45 minutes  Critical care was necessary to treat or prevent imminent or life-threatening deterioration of the following conditions: respiratory  "failure.  Subsequent provider of critical care: I assumed direction of critical care for this patient from another provider of my specialty.        Labs Reviewed   CBC W/ AUTO DIFFERENTIAL - Abnormal; Notable for the following:        Result Value    WBC 18.86 (*)     Immature Granulocytes 1.0 (*)     Gran # (ANC) 15.0 (*)     Immature Grans (Abs) 0.19 (*)     Mono # 1.1 (*)     Gran% 79.4 (*)     Lymph% 12.9 (*)     All other components within normal limits   ISTAT PROCEDURE - Abnormal; Notable for the following:     POC PH 7.070 (*)     POC PCO2 81.4 (*)     POC HCO3 23.6 (*)     POC SATURATED O2 73 (*)     All other components within normal limits   DRUG SCREEN PANEL, URINE EMERGENCY   ISTAT CHEM8             Medical Decision Making:   History:   Old Medical Records: I decided to obtain old medical records.  Initial Assessment:   27 year old male brought in by EMS in respiratory distress. Brother called 911 after finding patient cyanotic, unable to speak, wheezing. Patient was at baseline but complaining "it's kind of hard to breath" 1 hour before brother found him. He took his albuterol today. No recent illness, drug use, allergy exposure. EMS recorded O2 sat 50% on RA and immediately began bagging with improvement in saturations to 90s. After 30 minutes on BiPAP 10/15 with continuous nebs running, 125 mg solumedrol, 2 mg Mag, patient became increasingly somnolent. VBG shows pH 7.0. Decision made to intubate. Currently 100% on FiO2 40% with I:E 1:3.5 to prevent breath-stacking. ICU consulted for admission.   Clinical Tests:   Lab Tests: Ordered and Reviewed  Radiological Study: Ordered and Reviewed  Medical Tests: Ordered and Reviewed            Scribe Attestation:   Scribe #1: I performed the above scribed service and the documentation accurately describes the services I performed. I attest to the accuracy of the note.    Attending Attestation:   Physician Attestation Statement for Resident:  As the supervising " MD   Physician Attestation Statement: I have personally seen and examined this patient.   I agree with the above history. -: Available for consultation, discussed the case and participated in the care of the patient, and disposition    As the supervising MD I agree with the above PE.    As the supervising MD I agree with the above treatment, course, plan, and disposition.  I have reviewed the following: old records at this facility.                       Clinical Impression:   The primary encounter diagnosis was Respiratory failure, unspecified chronicity, unspecified whether with hypoxia or hypercapnia. A diagnosis of Acute dyspnea was also pertinent to this visit.    Disposition:   Disposition: Admitted  Condition: Critical                        Napoleon Estrada MD  02/25/18 9226

## 2018-02-25 NOTE — ED TRIAGE NOTES
Stanislaw Garcia, a 27 y.o. male presents to the ED arrived via [facility].  Pt had asthma attack for an hour, found by family.  Per EMS pt was cyanotic upon arrival and unresponsive. Pt ran out of meds for asthma.  Apneic and stomach breathing upon arrival.  Pulse in 150s. O2 55% ra upon arrival.  Pt bagged on way to hospital. Duoneb per mask in ambulance. .       No chief complaint on file.    Review of patient's allergies indicates:  No Known Allergies  Past Medical History:   Diagnosis Date    Asthma

## 2018-02-25 NOTE — LETTER
March 4, 2018             [facility]  [address]  [place]  [address]  Phone: [phone]  Fax: [phone] March 4, 2018     Patient: Stanislaw Garcia   YOB: 1990       To Whom It May Concern:    Stanislaw Garcia was admitted to the hospital on 2/25/2018  4:04 PM and discharged on 3/4/2018 at 12:30 PM.  He may return to work on 3/12/2018.  If you have any questions or concerns, please don't hesitate to call Dr. Yaakov Norton's office at [phone].      Sincerely,        Yaakov Norton MD  Department of Hospital Medicine

## 2018-02-26 PROBLEM — R65.20 SEVERE SEPSIS: Status: ACTIVE | Noted: 2018-02-26

## 2018-02-26 PROBLEM — A41.9 SEVERE SEPSIS: Status: ACTIVE | Noted: 2018-02-26

## 2018-02-26 PROBLEM — E87.20 LACTIC ACIDOSIS: Status: ACTIVE | Noted: 2018-02-26

## 2018-02-26 LAB
ALBUMIN SERPL BCP-MCNC: 3.1 G/DL
ALBUMIN SERPL BCP-MCNC: 3.1 G/DL
ALBUMIN SERPL BCP-MCNC: 3.2 G/DL
ALBUMIN SERPL BCP-MCNC: 3.4 G/DL
ALLENS TEST: ABNORMAL
ALP SERPL-CCNC: 58 U/L
ALT SERPL W/O P-5'-P-CCNC: 18 U/L
ANION GAP SERPL CALC-SCNC: 13 MMOL/L
ANION GAP SERPL CALC-SCNC: 7 MMOL/L
ANION GAP SERPL CALC-SCNC: 8 MMOL/L
ANION GAP SERPL CALC-SCNC: 8 MMOL/L
AST SERPL-CCNC: 31 U/L
BASOPHILS # BLD AUTO: 0 K/UL
BASOPHILS NFR BLD: 0 %
BILIRUB DIRECT SERPL-MCNC: 0.1 MG/DL
BILIRUB SERPL-MCNC: 0.2 MG/DL
BUN SERPL-MCNC: 13 MG/DL
BUN SERPL-MCNC: 15 MG/DL
BUN SERPL-MCNC: 16 MG/DL
BUN SERPL-MCNC: 18 MG/DL
CALCIUM SERPL-MCNC: 7.4 MG/DL
CALCIUM SERPL-MCNC: 7.9 MG/DL
CALCIUM SERPL-MCNC: 7.9 MG/DL
CALCIUM SERPL-MCNC: 8.1 MG/DL
CHLORIDE SERPL-SCNC: 109 MMOL/L
CHLORIDE SERPL-SCNC: 110 MMOL/L
CHLORIDE SERPL-SCNC: 110 MMOL/L
CHLORIDE SERPL-SCNC: 111 MMOL/L
CK MB SERPL-MCNC: 10.3 NG/ML
CK MB SERPL-RTO: 0.7 %
CK SERPL-CCNC: 1041 U/L
CK SERPL-CCNC: 1408 U/L
CO2 SERPL-SCNC: 15 MMOL/L
CO2 SERPL-SCNC: 18 MMOL/L
CO2 SERPL-SCNC: 19 MMOL/L
CO2 SERPL-SCNC: 21 MMOL/L
CREAT SERPL-MCNC: 0.8 MG/DL
CREAT SERPL-MCNC: 0.9 MG/DL
DELSYS: ABNORMAL
DIASTOLIC DYSFUNCTION: NO
DIFFERENTIAL METHOD: ABNORMAL
EOSINOPHIL # BLD AUTO: 0 K/UL
EOSINOPHIL NFR BLD: 0 %
ERYTHROCYTE [DISTWIDTH] IN BLOOD BY AUTOMATED COUNT: 12.6 %
ERYTHROCYTE [SEDIMENTATION RATE] IN BLOOD BY WESTERGREN METHOD: 16 MM/H
ERYTHROCYTE [SEDIMENTATION RATE] IN BLOOD BY WESTERGREN METHOD: 16 MM/H
ERYTHROCYTE [SEDIMENTATION RATE] IN BLOOD BY WESTERGREN METHOD: 20 MM/H
ERYTHROCYTE [SEDIMENTATION RATE] IN BLOOD BY WESTERGREN METHOD: 20 MM/H
ERYTHROCYTE [SEDIMENTATION RATE] IN BLOOD BY WESTERGREN METHOD: 24 MM/H
ERYTHROCYTE [SEDIMENTATION RATE] IN BLOOD BY WESTERGREN METHOD: 24 MM/H
EST. GFR  (AFRICAN AMERICAN): >60 ML/MIN/1.73 M^2
EST. GFR  (NON AFRICAN AMERICAN): >60 ML/MIN/1.73 M^2
FIO2: 30
FIO2: 35
FIO2: 35
FIO2: 40
FIO2: 40
FIO2: 50
GLUCOSE SERPL-MCNC: 124 MG/DL
GLUCOSE SERPL-MCNC: 133 MG/DL
GLUCOSE SERPL-MCNC: 163 MG/DL
GLUCOSE SERPL-MCNC: 221 MG/DL
HCO3 UR-SCNC: 16.6 MMOL/L (ref 24–28)
HCO3 UR-SCNC: 17.1 MMOL/L (ref 24–28)
HCO3 UR-SCNC: 18.7 MMOL/L (ref 24–28)
HCO3 UR-SCNC: 20.1 MMOL/L (ref 24–28)
HCO3 UR-SCNC: 23.1 MMOL/L (ref 24–28)
HCO3 UR-SCNC: 26.1 MMOL/L (ref 24–28)
HCT VFR BLD AUTO: 39.3 %
HGB BLD-MCNC: 13 G/DL
IMM GRANULOCYTES # BLD AUTO: 0.03 K/UL
IMM GRANULOCYTES NFR BLD AUTO: 0.3 %
LACTATE SERPL-SCNC: 3.5 MMOL/L
LACTATE SERPL-SCNC: 3.7 MMOL/L
LACTATE SERPL-SCNC: 3.7 MMOL/L
LACTATE SERPL-SCNC: 5.1 MMOL/L
LYMPHOCYTES # BLD AUTO: 0.5 K/UL
LYMPHOCYTES NFR BLD: 5.6 %
MAGNESIUM SERPL-MCNC: 2.2 MG/DL
MCH RBC QN AUTO: 30.5 PG
MCHC RBC AUTO-ENTMCNC: 33.1 G/DL
MCV RBC AUTO: 92 FL
MIN VOL: 10.1
MIN VOL: 6.85
MIN VOL: 7
MIN VOL: 7.61
MIN VOL: 8.07
MIN VOL: 9.62
MODE: ABNORMAL
MONOCYTES # BLD AUTO: 0.4 K/UL
MONOCYTES NFR BLD: 3.9 %
NEUTROPHILS # BLD AUTO: 8.5 K/UL
NEUTROPHILS NFR BLD: 90.2 %
NRBC BLD-RTO: 0 /100 WBC
PCO2 BLDA: 37.6 MMHG (ref 35–45)
PCO2 BLDA: 42 MMHG (ref 35–45)
PCO2 BLDA: 51.6 MMHG (ref 35–45)
PCO2 BLDA: 52.6 MMHG (ref 35–45)
PCO2 BLDA: 59.3 MMHG (ref 35–45)
PCO2 BLDA: 59.6 MMHG (ref 35–45)
PEEP: 5
PH SMN: 7.16 [PH] (ref 7.35–7.45)
PH SMN: 7.2 [PH] (ref 7.35–7.45)
PH SMN: 7.2 [PH] (ref 7.35–7.45)
PH SMN: 7.22 [PH] (ref 7.35–7.45)
PH SMN: 7.25 [PH] (ref 7.35–7.45)
PH SMN: 7.25 [PH] (ref 7.35–7.45)
PHOSPHATE SERPL-MCNC: 3 MG/DL
PHOSPHATE SERPL-MCNC: 3 MG/DL
PHOSPHATE SERPL-MCNC: 3.8 MG/DL
PHOSPHATE SERPL-MCNC: 3.9 MG/DL
PIP: 25
PIP: 33
PIP: 42
PIP: 42
PIP: 46
PIP: 53
PLATELET # BLD AUTO: 212 K/UL
PMV BLD AUTO: 10 FL
PO2 BLDA: 166 MMHG (ref 80–100)
PO2 BLDA: 49 MMHG (ref 80–100)
PO2 BLDA: 66 MMHG (ref 80–100)
PO2 BLDA: 72 MMHG (ref 80–100)
PO2 BLDA: 75 MMHG (ref 80–100)
PO2 BLDA: 89 MMHG (ref 80–100)
POC BE: -1 MMOL/L
POC BE: -10 MMOL/L
POC BE: -11 MMOL/L
POC BE: -11 MMOL/L
POC BE: -5 MMOL/L
POC BE: -8 MMOL/L
POC SATURATED O2: 74 % (ref 95–100)
POC SATURATED O2: 89 % (ref 95–100)
POC SATURATED O2: 91 % (ref 95–100)
POC SATURATED O2: 92 % (ref 95–100)
POC SATURATED O2: 94 % (ref 95–100)
POC SATURATED O2: 99 % (ref 95–100)
POC TCO2: 18 MMOL/L (ref 23–27)
POC TCO2: 18 MMOL/L (ref 23–27)
POC TCO2: 20 MMOL/L (ref 23–27)
POC TCO2: 22 MMOL/L (ref 23–27)
POC TCO2: 25 MMOL/L (ref 23–27)
POC TCO2: 28 MMOL/L (ref 23–27)
POCT GLUCOSE: 113 MG/DL (ref 70–110)
POCT GLUCOSE: 130 MG/DL (ref 70–110)
POCT GLUCOSE: 131 MG/DL (ref 70–110)
POTASSIUM SERPL-SCNC: 4.6 MMOL/L
POTASSIUM SERPL-SCNC: 4.7 MMOL/L
POTASSIUM SERPL-SCNC: 4.9 MMOL/L
POTASSIUM SERPL-SCNC: 4.9 MMOL/L
PROCALCITONIN SERPL IA-MCNC: 0.07 NG/ML
PROT SERPL-MCNC: 6.2 G/DL
RBC # BLD AUTO: 4.26 M/UL
RETIRED EF AND QEF - SEE NOTES: 60 (ref 55–65)
SAMPLE: ABNORMAL
SITE: ABNORMAL
SODIUM SERPL-SCNC: 136 MMOL/L
SODIUM SERPL-SCNC: 137 MMOL/L
SODIUM SERPL-SCNC: 137 MMOL/L
SODIUM SERPL-SCNC: 139 MMOL/L
SP02: 100
SP02: 100
SP02: 92
SP02: 95
SP02: 97
SP02: 98
TROPONIN I SERPL DL<=0.01 NG/ML-MCNC: 0.02 NG/ML
VT: 330
VT: 380
VT: 400
VT: 400
WBC # BLD AUTO: 9.44 K/UL

## 2018-02-26 PROCEDURE — 99291 CRITICAL CARE FIRST HOUR: CPT | Mod: 25,,, | Performed by: INTERNAL MEDICINE

## 2018-02-26 PROCEDURE — 82803 BLOOD GASES ANY COMBINATION: CPT

## 2018-02-26 PROCEDURE — 25000003 PHARM REV CODE 250: Performed by: STUDENT IN AN ORGANIZED HEALTH CARE EDUCATION/TRAINING PROGRAM

## 2018-02-26 PROCEDURE — 02HV33Z INSERTION OF INFUSION DEVICE INTO SUPERIOR VENA CAVA, PERCUTANEOUS APPROACH: ICD-10-PCS | Performed by: INTERNAL MEDICINE

## 2018-02-26 PROCEDURE — 80076 HEPATIC FUNCTION PANEL: CPT

## 2018-02-26 PROCEDURE — 83605 ASSAY OF LACTIC ACID: CPT | Mod: 91

## 2018-02-26 PROCEDURE — 80048 BASIC METABOLIC PNL TOTAL CA: CPT

## 2018-02-26 PROCEDURE — 63600175 PHARM REV CODE 636 W HCPCS: Performed by: INTERNAL MEDICINE

## 2018-02-26 PROCEDURE — 25000242 PHARM REV CODE 250 ALT 637 W/ HCPCS: Performed by: NURSE PRACTITIONER

## 2018-02-26 PROCEDURE — 63600175 PHARM REV CODE 636 W HCPCS: Performed by: EMERGENCY MEDICINE

## 2018-02-26 PROCEDURE — 85025 COMPLETE CBC W/AUTO DIFF WBC: CPT

## 2018-02-26 PROCEDURE — 95822 EEG COMA OR SLEEP ONLY: CPT | Mod: 26,,, | Performed by: PSYCHIATRY & NEUROLOGY

## 2018-02-26 PROCEDURE — 82550 ASSAY OF CK (CPK): CPT

## 2018-02-26 PROCEDURE — 99900035 HC TECH TIME PER 15 MIN (STAT)

## 2018-02-26 PROCEDURE — 27000221 HC OXYGEN, UP TO 24 HOURS

## 2018-02-26 PROCEDURE — 20000000 HC ICU ROOM

## 2018-02-26 PROCEDURE — 82553 CREATINE MB FRACTION: CPT

## 2018-02-26 PROCEDURE — 94640 AIRWAY INHALATION TREATMENT: CPT

## 2018-02-26 PROCEDURE — A4216 STERILE WATER/SALINE, 10 ML: HCPCS | Performed by: NURSE PRACTITIONER

## 2018-02-26 PROCEDURE — 84484 ASSAY OF TROPONIN QUANT: CPT

## 2018-02-26 PROCEDURE — 63600175 PHARM REV CODE 636 W HCPCS: Performed by: NURSE PRACTITIONER

## 2018-02-26 PROCEDURE — 97802 MEDICAL NUTRITION INDIV IN: CPT

## 2018-02-26 PROCEDURE — 84100 ASSAY OF PHOSPHORUS: CPT

## 2018-02-26 PROCEDURE — 37799 UNLISTED PX VASCULAR SURGERY: CPT

## 2018-02-26 PROCEDURE — 25000003 PHARM REV CODE 250: Performed by: INTERNAL MEDICINE

## 2018-02-26 PROCEDURE — 27200966 HC CLOSED SUCTION SYSTEM

## 2018-02-26 PROCEDURE — 25000242 PHARM REV CODE 250 ALT 637 W/ HCPCS: Performed by: STUDENT IN AN ORGANIZED HEALTH CARE EDUCATION/TRAINING PROGRAM

## 2018-02-26 PROCEDURE — 93306 TTE W/DOPPLER COMPLETE: CPT | Mod: 26,,, | Performed by: INTERNAL MEDICINE

## 2018-02-26 PROCEDURE — 82550 ASSAY OF CK (CPK): CPT | Mod: 91

## 2018-02-26 PROCEDURE — 25000003 PHARM REV CODE 250: Performed by: NURSE PRACTITIONER

## 2018-02-26 PROCEDURE — 99900026 HC AIRWAY MAINTENANCE (STAT)

## 2018-02-26 PROCEDURE — 80069 RENAL FUNCTION PANEL: CPT

## 2018-02-26 PROCEDURE — 25000242 PHARM REV CODE 250 ALT 637 W/ HCPCS: Performed by: INTERNAL MEDICINE

## 2018-02-26 PROCEDURE — 95822 EEG COMA OR SLEEP ONLY: CPT

## 2018-02-26 PROCEDURE — 94761 N-INVAS EAR/PLS OXIMETRY MLT: CPT

## 2018-02-26 PROCEDURE — 94003 VENT MGMT INPAT SUBQ DAY: CPT

## 2018-02-26 PROCEDURE — 93306 TTE W/DOPPLER COMPLETE: CPT

## 2018-02-26 PROCEDURE — 63600175 PHARM REV CODE 636 W HCPCS: Performed by: STUDENT IN AN ORGANIZED HEALTH CARE EDUCATION/TRAINING PROGRAM

## 2018-02-26 PROCEDURE — 83735 ASSAY OF MAGNESIUM: CPT

## 2018-02-26 PROCEDURE — 80069 RENAL FUNCTION PANEL: CPT | Mod: 91

## 2018-02-26 PROCEDURE — 36556 INSERT NON-TUNNEL CV CATH: CPT | Mod: ,,, | Performed by: NURSE PRACTITIONER

## 2018-02-26 RX ORDER — METHYLPREDNISOLONE SOD SUCC 125 MG
80 VIAL (EA) INJECTION EVERY 8 HOURS
Status: DISCONTINUED | OUTPATIENT
Start: 2018-02-26 | End: 2018-02-27

## 2018-02-26 RX ORDER — ALBUTEROL SULFATE 5 MG/ML
10 SOLUTION RESPIRATORY (INHALATION) ONCE
Status: DISCONTINUED | OUTPATIENT
Start: 2018-02-26 | End: 2018-02-26

## 2018-02-26 RX ORDER — BUDESONIDE 0.5 MG/2ML
0.5 INHALANT ORAL EVERY 12 HOURS
Status: DISCONTINUED | OUTPATIENT
Start: 2018-02-26 | End: 2018-02-27

## 2018-02-26 RX ORDER — HEPARIN SODIUM 5000 [USP'U]/ML
5000 INJECTION, SOLUTION INTRAVENOUS; SUBCUTANEOUS EVERY 8 HOURS
Status: DISCONTINUED | OUTPATIENT
Start: 2018-02-26 | End: 2018-02-27

## 2018-02-26 RX ORDER — NOREPINEPHRINE BITARTRATE/D5W 4MG/250ML
0.02 PLASTIC BAG, INJECTION (ML) INTRAVENOUS CONTINUOUS
Status: DISCONTINUED | OUTPATIENT
Start: 2018-02-26 | End: 2018-03-01

## 2018-02-26 RX ORDER — LORAZEPAM 2 MG/ML
1 INJECTION INTRAMUSCULAR ONCE
Status: COMPLETED | OUTPATIENT
Start: 2018-02-26 | End: 2018-02-26

## 2018-02-26 RX ORDER — ALBUTEROL SULFATE 0.83 MG/ML
2.5 SOLUTION RESPIRATORY (INHALATION)
Status: DISCONTINUED | OUTPATIENT
Start: 2018-02-26 | End: 2018-02-26

## 2018-02-26 RX ORDER — ALBUTEROL SULFATE 0.83 MG/ML
10 SOLUTION RESPIRATORY (INHALATION) ONCE
Status: COMPLETED | OUTPATIENT
Start: 2018-02-26 | End: 2018-02-26

## 2018-02-26 RX ORDER — FAMOTIDINE 20 MG/1
20 TABLET, FILM COATED ORAL 2 TIMES DAILY
Status: DISCONTINUED | OUTPATIENT
Start: 2018-02-26 | End: 2018-02-27

## 2018-02-26 RX ORDER — FAMOTIDINE 40 MG/5ML
20 POWDER, FOR SUSPENSION ORAL 2 TIMES DAILY
Status: DISCONTINUED | OUTPATIENT
Start: 2018-02-26 | End: 2018-02-26

## 2018-02-26 RX ORDER — INDOMETHACIN 25 MG/1
50 CAPSULE ORAL ONCE
Status: COMPLETED | OUTPATIENT
Start: 2018-02-26 | End: 2018-02-26

## 2018-02-26 RX ORDER — ENOXAPARIN SODIUM 100 MG/ML
40 INJECTION SUBCUTANEOUS EVERY 24 HOURS
Status: DISCONTINUED | OUTPATIENT
Start: 2018-02-26 | End: 2018-02-26

## 2018-02-26 RX ORDER — FENTANYL CITRATE-0.9 % NACL/PF 10 MCG/ML
PLASTIC BAG, INJECTION (ML) INTRAVENOUS CONTINUOUS
Status: DISCONTINUED | OUTPATIENT
Start: 2018-02-26 | End: 2018-03-01

## 2018-02-26 RX ADMIN — SODIUM CHLORIDE 500 ML: 9 INJECTION, SOLUTION INTRAVENOUS at 11:02

## 2018-02-26 RX ADMIN — MINERAL OIL AND WHITE PETROLATUM: 150; 830 OINTMENT OPHTHALMIC at 11:02

## 2018-02-26 RX ADMIN — Medication 2500 MCG: at 05:02

## 2018-02-26 RX ADMIN — IPRATROPIUM BROMIDE AND ALBUTEROL SULFATE 3 ML: .5; 3 SOLUTION RESPIRATORY (INHALATION) at 04:02

## 2018-02-26 RX ADMIN — PROPOFOL 40 MCG/KG/MIN: 10 INJECTION, EMULSION INTRAVENOUS at 10:02

## 2018-02-26 RX ADMIN — PIPERACILLIN AND TAZOBACTAM 4.5 G: 4; .5 INJECTION, POWDER, LYOPHILIZED, FOR SOLUTION INTRAVENOUS; PARENTERAL at 07:02

## 2018-02-26 RX ADMIN — ALBUTEROL SULFATE 10 MG: 2.5 SOLUTION RESPIRATORY (INHALATION) at 08:02

## 2018-02-26 RX ADMIN — Medication 3 ML: at 06:02

## 2018-02-26 RX ADMIN — METHYLPREDNISOLONE SODIUM SUCCINATE 80 MG: 125 INJECTION, POWDER, FOR SOLUTION INTRAMUSCULAR; INTRAVENOUS at 01:02

## 2018-02-26 RX ADMIN — IPRATROPIUM BROMIDE AND ALBUTEROL SULFATE 3 ML: .5; 3 SOLUTION RESPIRATORY (INHALATION) at 12:02

## 2018-02-26 RX ADMIN — VANCOMYCIN HYDROCHLORIDE 750 MG: 1 INJECTION, POWDER, LYOPHILIZED, FOR SOLUTION INTRAVENOUS at 05:02

## 2018-02-26 RX ADMIN — HEPARIN SODIUM 5000 UNITS: 5000 INJECTION, SOLUTION INTRAVENOUS; SUBCUTANEOUS at 03:02

## 2018-02-26 RX ADMIN — PIPERACILLIN AND TAZOBACTAM 4.5 G: 4; .5 INJECTION, POWDER, LYOPHILIZED, FOR SOLUTION INTRAVENOUS; PARENTERAL at 03:02

## 2018-02-26 RX ADMIN — SODIUM CHLORIDE 1000 ML: 0.9 INJECTION, SOLUTION INTRAVENOUS at 09:02

## 2018-02-26 RX ADMIN — METHYLPREDNISOLONE SODIUM SUCCINATE 80 MG: 125 INJECTION, POWDER, FOR SOLUTION INTRAMUSCULAR; INTRAVENOUS at 09:02

## 2018-02-26 RX ADMIN — BUDESONIDE 0.5 MG: 0.5 INHALANT RESPIRATORY (INHALATION) at 02:02

## 2018-02-26 RX ADMIN — PROPOFOL 50 MCG/KG/MIN: 10 INJECTION, EMULSION INTRAVENOUS at 04:02

## 2018-02-26 RX ADMIN — CISATRACURIUM BESYLATE 1.5 MCG/KG/MIN: 10 INJECTION INTRAVENOUS at 11:02

## 2018-02-26 RX ADMIN — PROPOFOL 40 MCG/KG/MIN: 10 INJECTION, EMULSION INTRAVENOUS at 06:02

## 2018-02-26 RX ADMIN — CHLORHEXIDINE GLUCONATE 15 ML: 1.2 RINSE ORAL at 09:02

## 2018-02-26 RX ADMIN — PIPERACILLIN AND TAZOBACTAM 4.5 G: 4; .5 INJECTION, POWDER, LYOPHILIZED, FOR SOLUTION INTRAVENOUS; PARENTERAL at 11:02

## 2018-02-26 RX ADMIN — IPRATROPIUM BROMIDE AND ALBUTEROL SULFATE 3 ML: .5; 3 SOLUTION RESPIRATORY (INHALATION) at 08:02

## 2018-02-26 RX ADMIN — SODIUM BICARBONATE 50 MEQ: 84 INJECTION, SOLUTION INTRAVENOUS at 01:02

## 2018-02-26 RX ADMIN — Medication 3 ML: at 01:02

## 2018-02-26 RX ADMIN — VANCOMYCIN HYDROCHLORIDE 750 MG: 1 INJECTION, POWDER, LYOPHILIZED, FOR SOLUTION INTRAVENOUS at 01:02

## 2018-02-26 RX ADMIN — CHLORHEXIDINE GLUCONATE 15 ML: 1.2 RINSE ORAL at 11:02

## 2018-02-26 RX ADMIN — Medication 250 MCG/HR: at 04:02

## 2018-02-26 RX ADMIN — ALBUTEROL SULFATE 10 MG: 2.5 SOLUTION RESPIRATORY (INHALATION) at 09:02

## 2018-02-26 RX ADMIN — CISATRACURIUM BESYLATE 3 MCG/KG/MIN: 10 INJECTION INTRAVENOUS at 11:02

## 2018-02-26 RX ADMIN — VANCOMYCIN HYDROCHLORIDE 750 MG: 1 INJECTION, POWDER, LYOPHILIZED, FOR SOLUTION INTRAVENOUS at 10:02

## 2018-02-26 RX ADMIN — IPRATROPIUM BROMIDE AND ALBUTEROL SULFATE 3 ML: .5; 3 SOLUTION RESPIRATORY (INHALATION) at 07:02

## 2018-02-26 RX ADMIN — HEPARIN SODIUM 5000 UNITS: 5000 INJECTION, SOLUTION INTRAVENOUS; SUBCUTANEOUS at 05:02

## 2018-02-26 RX ADMIN — MINERAL OIL AND WHITE PETROLATUM: 150; 830 OINTMENT OPHTHALMIC at 05:02

## 2018-02-26 RX ADMIN — HEPARIN SODIUM 5000 UNITS: 5000 INJECTION, SOLUTION INTRAVENOUS; SUBCUTANEOUS at 11:02

## 2018-02-26 RX ADMIN — SODIUM BICARBONATE: 84 INJECTION, SOLUTION INTRAVENOUS at 03:02

## 2018-02-26 RX ADMIN — FAMOTIDINE 20 MG: 20 TABLET, FILM COATED ORAL at 09:02

## 2018-02-26 RX ADMIN — IPRATROPIUM BROMIDE AND ALBUTEROL SULFATE 3 ML: .5; 3 SOLUTION RESPIRATORY (INHALATION) at 11:02

## 2018-02-26 RX ADMIN — IPRATROPIUM BROMIDE AND ALBUTEROL SULFATE 3 ML: .5; 3 SOLUTION RESPIRATORY (INHALATION) at 03:02

## 2018-02-26 RX ADMIN — LORAZEPAM 1 MG: 2 INJECTION INTRAMUSCULAR; INTRAVENOUS at 12:02

## 2018-02-26 RX ADMIN — MINERAL OIL AND WHITE PETROLATUM: 150; 830 OINTMENT OPHTHALMIC at 01:02

## 2018-02-26 RX ADMIN — Medication 3 ML: at 10:02

## 2018-02-26 RX ADMIN — FAMOTIDINE 20 MG: 20 TABLET, FILM COATED ORAL at 11:02

## 2018-02-26 RX ADMIN — METHYLPREDNISOLONE SODIUM SUCCINATE 80 MG: 125 INJECTION, POWDER, FOR SOLUTION INTRAMUSCULAR; INTRAVENOUS at 11:02

## 2018-02-26 NOTE — ASSESSMENT & PLAN NOTE
--reportedly ran out of his albuterol.  Unclear if additional trigger.  + tobacco use.  + marijuana on toxicity screen  --received continuous nebulizer for 1 hour, Magnesium, and Methylprednisolone 125 mg IV  -Intubated in ED  --Will continue scheduled albuterol/atrovent nebs and Methylprednisolone 80 mg IV daily  --leukocytosis 18,000, improved after fluids.  Cultures and viral panel negative to date  --Procal negative, less likely pneumonia but given severity of symptoms will continue treating with broad spectrum antibiotics  --paralzyed for better ventilation

## 2018-02-26 NOTE — CARE UPDATE
Not able to reach patient's mother, Andria, per numbers listed in chart to discuss central line placement.      AMBER Carpenter APRN, ACNP-BC  Critical Care Medicine  [phone]

## 2018-02-26 NOTE — PROCEDURES
"Stanislaw Garcia is a 27 y.o. male patient.    Pulse: 95 (18)  Resp: (!) 21 (18)  BP: 103/66 (18)  SpO2: (!) 93 % (18)  Weight: 50 kg (110 lb 3.7 oz) (18 1640)       Arterial Line  Date/Time: 2018 9:28 PM  Location procedure was performed: [facility] EMERGENCY DEPARTMENT  Performed by: CEASAR HERNANDEZ  Authorized by: CEASAR HERNANDEZ   Pre-op Diagnosis: acute respiratory failure  Post-operative diagnosis: acute respiratory failure  Consent Done: Yes  Consent: Verbal consent obtained.  Risks and benefits: risks, benefits and alternatives were discussed  Consent given by: mother  Patient understanding: patient states understanding of the procedure being performed  Patient consent: the patient's understanding of the procedure matches consent given  Procedure consent: procedure consent matches procedure scheduled  Relevant documents: relevant documents present and verified  Patient identity confirmed: name, MRN and   Time out: Immediately prior to procedure a "time out" was called to verify the correct patient, procedure, equipment, support staff and site/side marked as required.  Preparation: Patient was prepped and draped in the usual sterile fashion.  Indications: multiple ABGs and respiratory failure  Location: left radial  Patient sedated: yes  Sedatives: fentanyl and propofol  Vitals: Vital signs were monitored during sedation.  Needle gauge: 20  Seldinger technique: Seldinger technique used  Number of attempts: 1  Technical procedures used: ultrasound  Complications: No  Estimated blood loss (mL): 2  Post-procedure: line sutured and dressing applied  Post-procedure CMS: normal  Patient tolerance: Patient tolerated the procedure well with no immediate complications  Comments: Attempted placement of arterial line to right radial unsuccessful.  Tolerated well with no immediate complications.           Ceasar Hernandez  2018  "

## 2018-02-26 NOTE — H&P
[facility]  Critical Care Medicine  History & Physical    Patient Name: Stanislaw Garcia  MRN: [id number]  Admission Date: 2/25/2018  Hospital Length of Stay: 0 days  Code Status: Full Code  Attending Physician: Dr. Marvin  Primary Care Provider: Primary Doctor No   Principal Problem: Severe asthma with exacerbation    Subjective:     HPI:  Mr. Garcia is a 26 y/o male with history of asthma since childhood who presented to the ED with severe asthma exacerbation with acute hypercapnic and hypoxemic respiratory failure. He reportedly ran out of his albuterol.  History obtained from patient's brother, Luis A, and patient's mother, Andria.  Mr. Garcia only takes an albuterol inhaler for his asthma and was reportedly well controlled until December 2017 when he presented to the ED with an asthma exacerbation.  He was treated in the ED and discharged home with steroids and albuterol neb.  Per ED notes, Mr. Garcia had a similar episode approximately 6 months prior to this episode.  He reportedly improved following his asthma exacerbation in December.  Family state that Mr. Garcia has never required intubation.  Patient's mother reports that he has not been feeling well for 3 days.  No reports of sick contacts. His brother found him at ~3 pm this afternoon in respiratory distress and unresponsive.  Per ED notes, his brother reported that he was at his baseline complaining of difficulty breathing 1 hours prior to his brother finding him.   EMS was call and reported Spo2 50% on arrival.  They began providing assisted breaths with an ambu with subsequent improvement in Spo2 90's.  On arrival to ED, he was placed on BiPAP 10/15 with continuous nebs.  He received 125 mg solumedrol, 2 mg Magnesium.  His mental status worsened with somnolence and VBG showing pH 7.0, PaCO2 81.  He was subsequently intubated.  He also received 1 L NS bolus prior to intubation.      Mr. Garcia lives with his brother Luis A  in [place].  He has an older brother who lives in [place], [place], and his mother lives in [place], [place]. He reportedly smokes cigarettes (1 ppd) with occasional alcohol use.  No reports of drug use. Mr. Garcia does not see a Pulmonologist and family is not sure who prescribes his albuterol.          Hospital/ICU Course:  No notes on file         Review of Systems   Unable to perform ROS: Intubated     Objective:     Vital Signs (Most Recent):  Pulse: 90 (02/25/18 1950)  Resp: (!) 26 (02/25/18 1856)  BP: 116/62 (02/25/18 1946)  SpO2: 100 % (02/25/18 1950) Vital Signs (24h Range):  Pulse:  [] 90  Resp:  [22-31] 26  SpO2:  [95 %-100 %] 100 %  BP: ()/() 116/62   Weight: 50 kg (110 lb 3.7 oz)  Body mass index is 16.76 kg/m².      Intake/Output Summary (Last 24 hours) at 02/25/18 2013  Last data filed at 02/25/18 1817   Gross per 24 hour   Intake             1050 ml   Output                0 ml   Net             1050 ml       Physical Exam   Constitutional: He has a sickly appearance. He is sedated and intubated.   HENT:   Head: Normocephalic.   Eyes: Pupils are equal, round, and reactive to light. Right conjunctiva has mild hemorrhage. Left conjunctiva has mild hemorrhage.   Neck: JVD present.   Cardiovascular: Normal rate, regular rhythm and normal pulses.    No murmur heard.  Warm extremities   Pulmonary/Chest: Tachypnea noted. He is intubated. He has no decreased breath sounds. He has wheezes in the right upper field, the right middle field, the right lower field, the left upper field, the left middle field and the left lower field. He has no rhonchi. He has no rales.   Abdominal: Soft. Bowel sounds are normal. There is no tenderness.   Genitourinary:   Genitourinary Comments: Urine catheter with clear, yellow drainage   Lymphadenopathy:     He has no cervical adenopathy.   Neurological:   Sedated on propofol due to severe acute hypercapnic and hypoxemic respiratory failure.  No spontaneous eye opening.  PERRL 2mm, eyes midline, no eye deviation, nystagmus, or roving eye movements.  No facial weakness appreciated.  No spontaneous eye movement however brief episode of agitation with spontaneous movement of bilateral upper extremities.    Skin: Skin is warm and dry.   Vitals reviewed.      Vents:  Vent Mode: A/C (02/25/18 1950)  Ventilator Initiated: Yes (02/25/18 1751)  Set Rate: 14 bmp (02/25/18 1950)  Vt Set: 380 mL (02/25/18 1950)  Pressure Support: 0 cmH20 (02/25/18 1950)  PEEP/CPAP: 5 cmH20 (02/25/18 1950)  Oxygen Concentration (%): 30 (weaned per MDS order post ABG results ) (02/25/18 1950)  Peak Airway Pressure: 25 cmH2O (02/25/18 1950)  Plateau Pressure: 29 cmH20 (02/25/18 1950)  Total Ve: 5.85 mL (02/25/18 1950)  F/VT Ratio<105 (RSBI): 325 (02/25/18 1856)  Lines/Drains/Airways     Drain                 NG/OG Tube 02/25/18 1657 Faulk sump 18 Fr. Right mouth less than 1 day         Urethral Catheter 02/25/18 1809 Latex 16 Fr. less than 1 day          Airway                 Airway - Non-Surgical 02/25/18 1642 Endotracheal Tube less than 1 day          Arterial Line                 Arterial Line 02/25/18 1930 Left Radial less than 1 day          Peripheral Intravenous Line                 Peripheral IV - Single Lumen 02/25/18 1605 Right Forearm less than 1 day         Peripheral IV - Single Lumen 02/25/18 1606 Left Forearm less than 1 day              Significant Labs:    CBC/Anemia Profile:    Recent Labs  Lab 02/25/18  1610 02/25/18  1719 02/25/18  1723   WBC 18.86*  --   --    HGB 15.7  --   --    HCT 48.2 44 43     --   --    MCV 95  --   --    RDW 12.4  --   --         Chemistries:    Recent Labs  Lab 02/25/18  1610      K 4.7      CO2 17*   BUN 19   CREATININE 1.2   CALCIUM 9.3   ALBUMIN 4.2   PROT 7.8   BILITOT 0.3   ALKPHOS 76   ALT 22   AST 32   MG 2.3   PHOS 7.6*       All pertinent labs within the past 24 hours have been reviewed.    Significant Imaging:  I have reviewed and  interpreted all pertinent imaging results/findings within the past 24 hours.    Assessment/Plan:     Neuro   Encephalopathy, metabolic    --suspect secondary to sedatives (propofol + fentanyl) however unable to perform sedation vacation given severity of respiratory failure.   --reportedly in severe respiratory distress for approximately 30-45 minutes while waiting for EMS   --CT Head w/o contrast to evaluate for anoxic injury/cerbral edema.   --neuro check Q 1 hour        Pulmonary   Acute respiratory failure with hypoxia and hypercapnia    --see below.  Intubated in ED.        Severe asthma with exacerbation    --reportedly ran out of his albuterol.  Unclear if additional trigger.  + tobacco use.  + marijuana on toxicity screen  --received continuous nebulizer for 1 hour, Magnesium, and Methylprednisolone 125 mg IV  --will continue scheduled albuterol/atrovent nebs and Methylprednisolone 80 mg IV daily  --leukocytosis 18,000.  Send sputum culture, blood cultures, and respiratory viral panel  --start ceftriaxone + azithromycin given severity of asthma exacerbation   --consider paralytic if no significant improvement.   --monitor abg closely, arterial line placed.             Critical Care Daily Checklist:    A: Awake: RASS Goal/Actual Goal:  RASS 4  Actual:  RASS 4   B: Spontaneous Breathing Trial Performed?  NO   C: SAT & SBT Coordinated?           D: Delirium: CAM-ICU  not able to assess   E: Early Mobility Performed?    F: Feeding Goal:    Status:     Current Diet Order   Procedures    Diet NPO      AS: Analgesia/Sedation Propofol + fentanyl   T: Thromboembolic Prophylaxis SCDs   H: HOB > 300 Yes   U: Stress Ulcer Prophylaxis (if needed) pepcid   G: Glucose Control Frequent glucose checks   B: Bowel Function     I: Indwelling Catheter (Lines & Wood) Necessity PIV, left arterial line   D: De-escalation of Antimicrobials/Pharmacotherapies See above    Plan for the day/ETD Supportive care    Code  "Status:  Family/Goals of Care: Full Code       Discussed plan with Dr. Marvin    Patient's brother, Luis A, and mother updated over phone regarding Mr. Jarrett critical condition.  I explained current treatment plan and answered all of their questions.   Patient's mother provided consent for arterial line; however, she deferred consent for central line placement at this time due to prior incident when her mother received a "neck" catheter.     Critical Care Time: 65 minutes  Critical secondary to Patient has a condition that poses threat to life and bodily function: Acute hypercapnic and hypoxemic respiratory failure requiring mechanical ventilation.      Critical care was time spent personally by me on the following activities: development of treatment plan with patient or surrogate and bedside caregivers, discussions with consultants, evaluation of patient's response to treatment, examination of patient, ordering and performing treatments and interventions, ordering and review of laboratory studies, ordering and review of radiographic studies, pulse oximetry, re-evaluation of patient's condition. This critical care time did not overlap with that of any other provider or involve time for any procedures.     Vicenta Carpenter NP  Critical Care Medicine  [facility]  "

## 2018-02-26 NOTE — ASSESSMENT & PLAN NOTE
--reportedly ran out of his albuterol.  Unclear if additional trigger.  + tobacco use.  + marijuana on toxicity screen  --received continuous nebulizer for 1 hour, Magnesium, and Methylprednisolone 125 mg IV  --will continue scheduled albuterol/atrovent nebs and Methylprednisolone 80 mg IV daily  --leukocytosis 18,000.  Send sputum culture, blood cultures, and respiratory viral panel  --start empiric ceftriaxone + azithromycin given severity of asthma exacerbation   --consider paralytic if no significant improvement.   --monitor abg closely, arterial line placed.

## 2018-02-26 NOTE — SUBJECTIVE & OBJECTIVE
Interval History/Significant Events: Worsening lactic acidosis this morning. Overnight Chest xray showed subcutaneous and mediastinal emphysema, correlated with CT scan. Paralyzed and sedated.     Review of Systems   Unable to perform ROS: Intubated     Objective:     Vital Signs (Most Recent):  Temp: 97.7 °F (36.5 °C) (02/26/18 0700)  Pulse: 106 (02/26/18 0900)  Resp: (!) 24 (02/26/18 0900)  BP: (!) 99/56 (02/26/18 0900)  SpO2: 100 % (02/26/18 0900) Vital Signs (24h Range):  Temp:  [97.5 °F (36.4 °C)-98.1 °F (36.7 °C)] 97.7 °F (36.5 °C)  Pulse:  [] 106  Resp:  [9-31] 24  SpO2:  [91 %-100 %] 100 %  BP: ()/() 99/56  Arterial Line BP: ()/(52-71) 94/58   Weight: 50 kg (110 lb 3.7 oz)  Body mass index is 17.79 kg/m².      Intake/Output Summary (Last 24 hours) at 02/26/18 0901  Last data filed at 02/26/18 0900   Gross per 24 hour   Intake             2973 ml   Output             1170 ml   Net             1803 ml       Physical Exam   Constitutional: He is sedated and intubated.   HENT:   Head: Normocephalic and atraumatic.   Eyes: Right pupil is not reactive (constricted bilaterally). Left pupil is not reactive. Pupils are equal.   Neck: JVD present.   Cardiovascular: Normal rate, regular rhythm and normal pulses.    No murmur heard.  Warm extremities   Pulmonary/Chest: He is intubated. He has wheezes in the right upper field, the right middle field, the right lower field, the left upper field, the left middle field and the left lower field. He has no rhonchi. He has no rales.   No subcutaneous emphysema appreciated   Abdominal: Soft. Bowel sounds are normal. He exhibits no distension. Tenderness:                                                                                                                                                                                Genitourinary:   Genitourinary Comments: Urine catheter with clear, yellow drainage   Musculoskeletal: He exhibits no edema or  deformity.   Lymphadenopathy:     He has no cervical adenopathy.   Neurological:   Sedated on propofol due to severe acute hypercapnic and hypoxemic respiratory failure.  Constricted pupils with no appreciable reactivity. Flaccid paralysis. Diminished reflexes diffusely.    Skin: Skin is warm and dry.   Nursing note and vitals reviewed.      Vents:  Vent Mode: A/C (02/26/18 0846)  Ventilator Initiated: Yes (02/25/18 1751)  Set Rate: 24 bmp (02/26/18 0846)  Vt Set: 380 mL (02/26/18 0846)  Pressure Support: 0 cmH20 (02/26/18 0846)  PEEP/CPAP: 5 cmH20 (02/26/18 0846)  Oxygen Concentration (%): 35 (02/26/18 0900)  Peak Airway Pressure: 27 cmH2O (02/26/18 0846)  Plateau Pressure: 0 cmH20 (02/26/18 0846)  Total Ve: 9.56 mL (02/26/18 0846)  F/VT Ratio<105 (RSBI): (!) 60.45 (02/26/18 0846)  Lines/Drains/Airways     Drain                 NG/OG Tube 02/25/18 1657 Bryant sump 18 Fr. Right mouth less than 1 day         Urethral Catheter 02/25/18 1809 Latex 16 Fr. less than 1 day          Airway                 Airway - Non-Surgical 02/25/18 1642 Endotracheal Tube less than 1 day          Arterial Line                 Arterial Line 02/25/18 1930 Left Radial less than 1 day          Peripheral Intravenous Line                 Peripheral IV - Single Lumen 02/25/18 1605 Right Forearm less than 1 day         Peripheral IV - Single Lumen 02/25/18 1606 Left Forearm less than 1 day         Peripheral IV - Single Lumen 02/25/18 2200 Left;Medial Forearm less than 1 day              Significant Labs:    CBC/Anemia Profile:    Recent Labs  Lab 02/25/18  1610 02/25/18  1719 02/25/18  1723 02/26/18  0310   WBC 18.86*  --   --  9.44   HGB 15.7  --   --  13.0*   HCT 48.2 44 43 39.3*     --   --  212   MCV 95  --   --  92   RDW 12.4  --   --  12.6        Chemistries:    Recent Labs  Lab 02/25/18  1610 02/25/18  2345 02/26/18  0310    136 137   K 4.7 4.7 4.9    110 109   CO2 17* 19* 15*   BUN 19 18 16   CREATININE 1.2 0.8 0.9    CALCIUM 9.3 7.4* 8.1*   ALBUMIN 4.2 3.2* 3.4*   PROT 7.8  --  6.2   BILITOT 0.3  --  0.2   ALKPHOS 76  --  58   ALT 22  --  18   AST 32  --  31   MG 2.3  --  2.2   PHOS 7.6* 3.8 3.0       Recent Lab Results       02/26/18  0831 02/26/18  0745 02/26/18  0426 02/26/18  0310 02/26/18  0147      Benzodiazepines          Methadone metabolites          Phencyclidine          Immature Granulocytes    0.3      Immature Grans (Abs)    0.03  Comment:  Mild elevation in immature granulocytes is non specific and   can be seen in a variety of conditions including stress response,   acute inflammation, trauma and pregnancy. Correlation with other   laboratory and clinical findings is essential.        Procalcitonin          Time Notifed:          Provider Notified:          Verbal Result Readback Performed          Albumin    3.4(L)      Alkaline Phosphatase    58      Allens Test   N/A  Pass     ALT    18      Amphetamine Screen, Ur          Anion Gap    13      AST    31      Barbiturate Screen, Ur          Baso #    0.00      Basophil%    0.0      Bilirubin, Direct    0.1      Total Bilirubin    0.2  Comment:  For infants and newborns, interpretation of results should be based  on gestational age, weight and in agreement with clinical  observations.  Premature Infant recommended reference ranges:  Up to 24 hours.............<8.0 mg/dL  Up to 48 hours............<12.0 mg/dL  3-5 days..................<15.0 mg/dL  6-29 days.................<15.0 mg/dL        Site   Sobeida/UAC  Sobeida/UAC     BUN, Bld    16      Calcium    8.1(L)      Chloride    109      CO2    15(L)      Cocaine (Metab.)          CPK MB    10.3(H)      Creatinine    0.9      Creatinine, Random Ur          DelSys   Adult Vent  Adult Vent     Differential Method    Automated      eGFR if     >60.0      eGFR if non     >60.0  Comment:  Calculation used to obtain the estimated glomerular filtration  rate (eGFR) is the CKD-EPI  equation.         Eos #    0.0      Eosinophil%    0.0      FiO2   35  35     Glucose    163(H)      Gram Stain (Respiratory)          Gran # (ANC)    8.5(H)      Gran%    90.2(H)      Hematocrit    39.3(L)      Hemoglobin    13.0(L)      Coumadin Monitoring INR          Lactate, Ziyad  3.5  Comment:  Falsely low lactic acid results can be found in samples   containing >=13.0 mg/dL total bilirubin and/or >=3.5 mg/dL   direct bilirubin.  *Critical value -   Results called to and read back by:eric payan rn  ()  5.1  Comment:  Falsely low lactic acid results can be found in samples   containing >=13.0 mg/dL total bilirubin and/or >=3.5 mg/dL   direct bilirubin.  *Critical value -   Results called to and read back by:MOE BELLO RN  ()      Lymph #    0.5(L)      Lymph%    5.6(L)      Magnesium    2.2      MCH    30.5      MCHC    33.1      MCV    92      Min Vol   9.62  8.07     Mode   AC/PRVC  AC/PRVC     Mono #    0.4      Mono%    3.9(L)      MPV    10.0      nRBC    0      Opiate Scrn, Ur          PEEP   5  5     Phosphorus    3.0      PiP   33  25     Platelets    212      POC BE   -11  -11     POC BUN          POC Chloride          POC Creatinine          POC Glucose          POC HCO3   16.6(L)  17.1(L)     POC Hematocrit          POC Ionized Calcium          POC PCO2   37.6  42.0     POC PH   7.254(LL)  7.217(LL)     POC PO2   66(L)  75(L)     POC Potassium          POC SATURATED O2   89(L)  92(L)     POC Sodium          POC TCO2   18(L)  18(L)     POC TCO2 (MEASURED)          POCT Glucose 113(H)         Potassium    4.9      Total Protein    6.2      Protime          Provider Credentials:          Rate   24  20     RBC    4.26(L)      RDW    12.6      Sample   ARTERIAL  ARTERIAL     Sodium    137      Sp02   92  95     Marijuana (THC) Metabolite          Toxicology Information          Troponin I    0.023  Comment:  The reference interval for Troponin I represents the 99th percentile   cutoff   for our  facility and is consistent with 3rd generation assay   performance.        Vt   380  380     WBC    9.44                  02/25/18  2345 02/25/18  2247 02/25/18  2243 02/25/18  2150 02/25/18  2110      Benzodiazepines          Methadone metabolites          Phencyclidine          Immature Granulocytes          Immature Grans (Abs)          Procalcitonin          Time Notifed:          Provider Notified:          Verbal Result Readback Performed          Albumin 3.2(L)         Alkaline Phosphatase          Allens Test   Pass       ALT          Amphetamine Screen, Ur          Anion Gap 7(L)         AST          Barbiturate Screen, Ur          Baso #          Basophil%          Bilirubin, Direct          Total Bilirubin          Site   Sobeida/UAC       BUN, Bld 18         Calcium 7.4(L)         Chloride 110         CO2 19(L)         Cocaine (Metab.)          CPK MB          Creatinine 0.8         Creatinine, Random Ur          DelSys   Adult Vent       Differential Method          eGFR if  >60.0         eGFR if non  >60.0  Comment:  Calculation used to obtain the estimated glomerular filtration  rate (eGFR) is the CKD-EPI equation.            Eos #          Eosinophil%          FiO2   30       Glucose 221(H)         Gram Stain (Respiratory)    <10 epithelial cells per low power field.          Many WBC's          Many Gram positive cocci      Gran # (ANC)          Gran%          Hematocrit          Hemoglobin          Coumadin Monitoring INR          Lactate, Ziyad     3.8  Comment:  Falsely low lactic acid results can be found in samples   containing >=13.0 mg/dL total bilirubin and/or >=3.5 mg/dL   direct bilirubin.  *Critical value -   Results called to and read back by:Betty Blanchard RN  ()     Lymph #          Lymph%          Magnesium          MCH          MCHC          MCV          Min Vol   5.54       Mode   AC/PRVC       Mono #          Mono%          MPV          nRBC           Opiate Scrn, Ur          PEEP   5       Phosphorus 3.8         PiP   31       Platelets          POC BE   -8       POC BUN          POC Chloride          POC Creatinine          POC Glucose          POC HCO3   20.5(L)       POC Hematocrit          POC Ionized Calcium          POC PCO2   56.2(HH)       POC PH   7.170(LL)       POC PO2   72(L)       POC Potassium          POC SATURATED O2   89(L)       POC Sodium          POC TCO2   22(L)       POC TCO2 (MEASURED)          POCT Glucose  155(H)        Potassium 4.7         Total Protein          Protime          Provider Credentials:          Rate   14       RBC          RDW          Sample   ARTERIAL       Sodium 136         Sp02   92       Marijuana (THC) Metabolite          Toxicology Information          Troponin I          Vt   380       WBC                      02/25/18  1946 02/25/18  1825 02/25/18  1723 02/25/18  1719 02/25/18  1625      Benzodiazepines  Negative        Methadone metabolites  Negative        Phencyclidine  Negative        Immature Granulocytes          Immature Grans (Abs)          Procalcitonin          Time Notifed:   1723       Provider Notified:   FRANCHESCA       Verbal Result Readback Performed   Yes       Albumin          Alkaline Phosphatase          Allens Test N/A  Pass  N/A     ALT          Amphetamine Screen, Ur  Negative        Anion Gap          AST          Barbiturate Screen, Ur  Negative        Baso #          Basophil%          Bilirubin, Direct          Total Bilirubin          Site Sobeida/UAC  RR  Other     BUN, Bld          Calcium          Chloride          CO2          Cocaine (Metab.)  Negative        CPK MB          Creatinine          Creatinine, Random Ur  50.0  Comment:  The random urine reference ranges provided were established   for 24 hour urine collections.  No reference ranges exist for  random urine specimens.  Correlate clinically.          DelSys Adult Vent  Adult Vent       Differential Method          eGFR  if           eGFR if non           Eos #          Eosinophil%          FiO2 50  40       Glucose          Gram Stain (Respiratory)          Gran # (ANC)          Gran%          Hematocrit          Hemoglobin          Coumadin Monitoring INR          Lactate, Rossy          Lymph #          Lymph%          Magnesium          MCH          MCHC          MCV          Min Vol 6.92  8.4       Mode AC/PRVC  AC/PRVC       Mono #          Mono%          MPV          nRBC          Opiate Scrn, Ur  Negative        PEEP 5  5       Phosphorus          PiP 28  23       Platelets          POC BE -5  -6  -7     POC BUN    20      POC Chloride    105      POC Creatinine    0.9      POC Glucose    157(H)      POC HCO3 23.4(L)  24.2  23.6(L)     POC Hematocrit   43 44      POC Ionized Calcium   1.21 1.19      POC PCO2 59.7(HH)  81.2(HH)  81.4(H)     POC PH 7.202(LL)  7.082(LL)  7.070(L)     POC PO2 130(H)  107(H)  55     POC Potassium   4.3 4.3      POC SATURATED O2 98  95  73(L)     POC Sodium   138 140      POC TCO2 25  27  26     POC TCO2 (MEASURED)    25      POCT Glucose          Potassium          Total Protein          Protime          Provider Credentials:   MD       Rate 14  20       RBC          RDW          Sample ARTERIAL  ARTERIAL ROSSY VENOUS     Sodium          Sp02 100  100       Marijuana (THC) Metabolite  Presumptive Positive        Toxicology Information  SEE COMMENT  Comment:  This screen includes the following classes of drugs at the   listed cut-off:  Benzodiazepines                  200 ng/ml  Methadone                        300 ng/ml  Cocaine metabolite               300 ng/ml  Opiates                          300 ng/ml  Barbiturates                     200 ng/ml  Amphetamines                    1000 ng/ml  Marijuana metabs (THC)            50 ng/ml  Phencyclidine (PCP)               25 ng/ml  High concentrations of Diphenhydramine may cross-react with  Phencyclidine PCP screening  immunoassay giving a false   positive result.  High concentrations of Methylenedioxymethamphetamine (MDMA aka  Ectasy) and other structurally similar compounds may cross-   react with the Amphetamine/Methamphetamine screening   immunoassay giving a false positive result.  A metabolite of the anti-HIV drug Sustiva () may cause  false positive results in the Marijuana metabolite (THC)   screening assay.  Note: This exception list includes only more common   interferants in toxicology screen testing.  Because of many   cross-reactantspositive results on toxicology drug screens   should be confirmed whenever results do not correlate with   clinical presentation.  This report is intended for use in clinical monitoring and  management of patients. It is not intended for use in   employment related drug testing.  Because of any cross-reactants, positive results on toxicology  drug screens should be confirmed whenever results do not  correlate with clinical presentation.  Presumptive positive results are unconfirmed and may be used   only for medical purposes.          Troponin I          Vt 380  300       WBC                      02/25/18  1610      Benzodiazepines      Methadone metabolites      Phencyclidine      Immature Granulocytes 1.0(H)     Immature Grans (Abs) 0.19  Comment:  Mild elevation in immature granulocytes is non specific and   can be seen in a variety of conditions including stress response,   acute inflammation, trauma and pregnancy. Correlation with other   laboratory and clinical findings is essential.  (H)     Procalcitonin 0.07  Comment:  A concentration < 0.25 ng/mL represents a low risk bacterial   infection.  Procalcitonin may not be accurate among patients with localized   infection, recent trauma or major surgery, immunosuppressed state,   invasive fungal infection, renal dysfunction. Decisions regarding   initiation or continuation of antibiotic therapy should not be based   solely on  procalcitonin levels.       Time Notifed:      Provider Notified:      Verbal Result Readback Performed      Albumin 4.2     Alkaline Phosphatase 76     Allens Test      ALT 22     Amphetamine Screen, Ur      Anion Gap 17(H)     AST 32     Barbiturate Screen, Ur      Baso # 0.06     Basophil% 0.3     Bilirubin, Direct      Total Bilirubin 0.3  Comment:  For infants and newborns, interpretation of results should be based  on gestational age, weight and in agreement with clinical  observations.  Premature Infant recommended reference ranges:  Up to 24 hours.............<8.0 mg/dL  Up to 48 hours............<12.0 mg/dL  3-5 days..................<15.0 mg/dL  6-29 days.................<15.0 mg/dL       Site      BUN, Bld 19     Calcium 9.3     Chloride 105     CO2 17(L)     Cocaine (Metab.)      CPK MB      Creatinine 1.2     Creatinine, Random Ur      DelSys      Differential Method Automated     eGFR if African American >60.0     eGFR if non  >60.0  Comment:  Calculation used to obtain the estimated glomerular filtration  rate (eGFR) is the CKD-EPI equation.        Eos # 0.1     Eosinophil% 0.6     FiO2      Glucose 208(H)     Gram Stain (Respiratory)      Gran # (ANC) 15.0(H)     Gran% 79.4(H)     Hematocrit 48.2     Hemoglobin 15.7     Coumadin Monitoring INR 1.1  Comment:  Coumadin Therapy:  2.0 - 3.0 for INR for all indicators except mechanical heart valves  and antiphospholipid syndromes which should use 2.5 - 3.5.       Lactate, Ziyad      Lymph # 2.4     Lymph% 12.9(L)     Magnesium 2.3     MCH 31.0     MCHC 32.6     MCV 95     Min Vol      Mode      Mono # 1.1(H)     Mono% 5.8     MPV 9.8     nRBC 0     Opiate Scrn, Ur      PEEP      Phosphorus 7.6(H)     PiP      Platelets 286     POC BE      POC BUN      POC Chloride      POC Creatinine      POC Glucose      POC HCO3      POC Hematocrit      POC Ionized Calcium      POC PCO2      POC PH      POC PO2      POC Potassium      POC SATURATED O2       POC Sodium      POC TCO2      POC TCO2 (MEASURED)      POCT Glucose      Potassium 4.7     Total Protein 7.8     Protime 11.1     Provider Credentials:      Rate      RBC 5.06     RDW 12.4     Sample      Sodium 139     Sp02      Marijuana (THC) Metabolite      Toxicology Information      Troponin I 0.028  Comment:  The reference interval for Troponin I represents the 99th percentile   cutoff   for our facility and is consistent with 3rd generation assay   performance.  (H)     Vt      WBC 18.86(H)           Significant Imaging:  Chest xray and CT scan reviewed. Mild subcutaneous emphysema and small amount of air in mediastinum

## 2018-02-26 NOTE — SUBJECTIVE & OBJECTIVE
Review of Systems   Unable to perform ROS: Intubated     Objective:     Vital Signs (Most Recent):  Pulse: 90 (02/25/18 1950)  Resp: (!) 26 (02/25/18 1856)  BP: 116/62 (02/25/18 1946)  SpO2: 100 % (02/25/18 1950) Vital Signs (24h Range):  Pulse:  [] 90  Resp:  [22-31] 26  SpO2:  [95 %-100 %] 100 %  BP: ()/() 116/62   Weight: 50 kg (110 lb 3.7 oz)  Body mass index is 16.76 kg/m².      Intake/Output Summary (Last 24 hours) at 02/25/18 2013  Last data filed at 02/25/18 1817   Gross per 24 hour   Intake             1050 ml   Output                0 ml   Net             1050 ml       Physical Exam   Constitutional: He has a sickly appearance. He is sedated and intubated.   HENT:   Head: Normocephalic.   Eyes: Pupils are equal, round, and reactive to light. Right conjunctiva has a hemorrhage. Left conjunctiva has a hemorrhage.   Neck: JVD present.   Cardiovascular: Normal rate, regular rhythm and normal pulses.    No murmur heard.  Warm extremities   Pulmonary/Chest: Tachypnea noted. He is intubated. He has no decreased breath sounds. He has wheezes in the right upper field, the right middle field, the right lower field, the left upper field, the left middle field and the left lower field. He has no rhonchi. He has no rales.   Abdominal: Soft. Bowel sounds are normal. There is no tenderness.   Genitourinary:   Genitourinary Comments: Urine catheter with clear, yellow drainage   Lymphadenopathy:     He has no cervical adenopathy.   Neurological:   Sedated on propofol due to severe acute hypercapnic and hypoxemic respiratory failure.  No eye opening to noxious stimuli.  PERRL, eyes midline, no eye deviation, nystagmus, or roving eye movements.  No facial weakness appreciated.  No spontaneous eye movement however episode of agitation with spontaneous movement of bilateral upper extremities.    Skin: Skin is warm and dry.   Vitals reviewed.      Vents:  Vent Mode: A/C (02/25/18 1950)  Ventilator  Initiated: Yes (02/25/18 1751)  Set Rate: 14 bmp (02/25/18 1950)  Vt Set: 380 mL (02/25/18 1950)  Pressure Support: 0 cmH20 (02/25/18 1950)  PEEP/CPAP: 5 cmH20 (02/25/18 1950)  Oxygen Concentration (%): 30 (weaned per MDS order post ABG results ) (02/25/18 1950)  Peak Airway Pressure: 25 cmH2O (02/25/18 1950)  Plateau Pressure: 29 cmH20 (02/25/18 1950)  Total Ve: 5.85 mL (02/25/18 1950)  F/VT Ratio<105 (RSBI): 325 (02/25/18 1856)  Lines/Drains/Airways     Drain                 NG/OG Tube 02/25/18 1657 [place] sump 18 Fr. Right mouth less than 1 day         Urethral Catheter 02/25/18 1809 Latex 16 Fr. less than 1 day          Airway                 Airway - Non-Surgical 02/25/18 1642 Endotracheal Tube less than 1 day          Arterial Line                 Arterial Line 02/25/18 1930 Left Radial less than 1 day          Peripheral Intravenous Line                 Peripheral IV - Single Lumen 02/25/18 1605 Right Forearm less than 1 day         Peripheral IV - Single Lumen 02/25/18 1606 Left Forearm less than 1 day              Significant Labs:    CBC/Anemia Profile:    Recent Labs  Lab 02/25/18  1610 02/25/18  1719 02/25/18  1723   WBC 18.86*  --   --    HGB 15.7  --   --    HCT 48.2 44 43     --   --    MCV 95  --   --    RDW 12.4  --   --         Chemistries:    Recent Labs  Lab 02/25/18  1610      K 4.7      CO2 17*   BUN 19   CREATININE 1.2   CALCIUM 9.3   ALBUMIN 4.2   PROT 7.8   BILITOT 0.3   ALKPHOS 76   ALT 22   AST 32   MG 2.3   PHOS 7.6*       All pertinent labs within the past 24 hours have been reviewed.    Significant Imaging:  I have reviewed and interpreted all pertinent imaging results/findings within the past 24 hours.

## 2018-02-26 NOTE — CONSULTS
"  [facility]  Adult Nutrition  Consult Note    SUMMARY     Recommendations  Recommendation/Intervention:   1. When able to extubate, ADAT to Regular with texture per SLP.   2. If unable to extubate, recommend initiating TF of Isosource 1.5 at a goal rate of 45 mL/hr - to provide 1620 kcal/day, 73 g protein/day, and 825 mL free fluid/day.   RD to monitor.    Goals: Patient to receive nutrition by RD follow-up  Nutrition Goal Status: new  Communication of RD Recs:  (POC)    Reason for Assessment  Reason for Assessment: nurse/nurse practitioner consult  Diagnosis:  (respiratory failure)  Relevent Medical History: asthma, tobacco abuse   General Information Comments: Patient intubated, sedated, paralyzed. Plan to start TF.  Nutrition Discharge Planning: Unable to determine at this time.    Nutrition Prescription Ordered  Current Diet Order: NPO    Evaluation of Received Nutrients/Fluid Intake  Other Calories (kcal): 40 (propofol)  I/O: +1.9L since admit  Comments: No BM recorded  % Intake of Estimated Energy Needs: 0 - 25 %  % Meal Intake: NPO     Nutrition/Diet History  Food Preferences: YARI  Factors Affecting Nutritional Intake: NPO, on mechanical ventilation    Labs/Tests/Procedures/Meds   Pertinent Labs Reviewed: reviewed  Pertinent Labs Comments: Glu 163, Ca 8.1, Alb 3.4  Pertinent Medications Reviewed: reviewed  Pertinent Medications Comments: famotidine, methylprednisolone, nimbex, fentanyl, levophed, propofol    Physical Findings  Overall Physical Appearance: on ventilator support  Tubes: orogastric tube  Oral/Mouth Cavity: WDL  Skin: intact    Anthropometrics  Height: 5' 6" (167.6 cm) (reviewed)  Weight: 50 kg (110 lb 3.7 oz) (reviewed)  Ideal Body Weight (IBW), Male: 142 lb  % Ideal Body Weight, Male (lb): 77.63 lb  BMI (Calculated): 17.8  BMI Grade: 17 - 18.4 protein-energy malnutrition grade I  Usual Body Weight (UBW), k.2 kg (2017 per chart review)  % Usual Body Weight: 95.99  % " Weight Change From Usual Weight: -4.22 %    Estimated/Assessed Needs  Weight Used For Calorie Calculations: 50 kg (110 lb 3.7 oz)   Energy Calorie Requirements (kcal): 1604 kcal/day  Energy Need Method: [place]  RMR ([place] Jeor Equation): 1427.75  RMR ([place] Equation): 1604.35  RMR (Modified [place] Equation): 1696.49  Weight Used For Protein Calculations: 50 kg (110 lb 3.7 oz)  Protein Requirements: 60-75 g/day (1.2-1.5 g/kg)  Fluid Requirements (mL): 1 mL/kcal or per MD  Fluid Need Method: RDA Method  RDA Method (mL): 1604    Assessment and Plan  Acute respiratory failure with hypoxia and hypercapnia    Nutrition Problem  Inadequate energy intake    Related to (etiology):   Decreased ability to consume sufficient energy    Signs and Symptoms (as evidenced by):   NPO and meeting < 85% EEN and EPN    Nutrition Diagnosis Status:   New          Monitor and Evaluation  Food and Nutrient Intake: energy intake  Food and Nutrient Adminstration: diet order  Anthropometric Measurements: weight, weight change  Biochemical Data, Medical Tests and Procedures: electrolyte and renal panel, gastrointestinal profile, inflammatory profile  Nutrition-Focused Physical Findings: overall appearance    Nutrition Risk  Level of Risk:  (2x/week)    Nutrition Follow-Up  RD Follow-up?: Yes

## 2018-02-26 NOTE — ASSESSMENT & PLAN NOTE
Highest level 5.1 overnight  Improved to 3.5 this morning  Given 1L NS this AM and treating with broad spectrum abx for presumed sepsis and pneumonia although no definite radiographic features to suggest this at this time.   Patient has been hypotensive with MAPs in the high 60s but is not on pressors at this time. Tachycardic.

## 2018-02-26 NOTE — ASSESSMENT & PLAN NOTE
--suspect secondary to sedatives (propofol + fentanyl) however unable to perform sedation vacation given severity of respiratory failure.   --reportedly in severe respiratory distress for approximately 30-45 minutes while waiting for EMS   --CT Head w/o contrast to evaluate for anoxic injury/cerbral edema.   --neuro check Q 1 hour

## 2018-02-26 NOTE — HPI
Mr. Garcia is a 28 y/o male with history of asthma since childhood who presented to the ED with severe asthma exacerbation with acute hypercapnic and hypoxemic respiratory failure. He reportedly ran out of his albuterol.  History obtained from brother, Luis A, and patient's mother.  Mr. Garcia only takes an albuterol inhaler for his asthma and was reportedly well controlled until December when he presented to the ED with asthma exacerbation.  He was treated in the ED and discharged home.  He reportedly improved following exacerbation; however, again worsened 1 month ago.  Patient's mother reports that he has not been feeling well for 3 days.  No reports of sick contacts. His brother found him at ~3 pm this afternoon in respiratory distress and unresponsive.  His brother reported that he was at his baseline complaining of difficulty breathing 1 hours prior to his brother finding him.   EMS called and reported Spo2 50% on arrival at which time they began providing assisted breaths with an ambu with subsequent improvement in Spo2 90's.  On arrival to ED, he was placed on BiPAP 10/15 with continuous nebs.  He received 125 mg solumedrol, 2 mg Magnesium.  His mental status worsened with somnolence and VBG showing pH 7.0, PaCO2 81.  He was subsequently intubated.      Mr. Garcia lives with his brother Luis A in [place].  He has an older brother who lives in [place], [place], and his mother lives in [place], [place]. He reported smokes cigarettes (1 ppd) with occasional alcohol use.  No reports of drug use. Mr. Garcia does not see a Pulmonologist and family are not sure who prescribes his albuterol.

## 2018-02-26 NOTE — ASSESSMENT & PLAN NOTE
Nutrition Problem  Inadequate energy intake    Related to (etiology):   Decreased ability to consume sufficient energy    Signs and Symptoms (as evidenced by):   NPO and meeting < 85% EEN and EPN    Nutrition Diagnosis Status:   New

## 2018-02-26 NOTE — PROGRESS NOTES
2030-2055 patient transferred from ED 2 to CT scan and then to room 6079. Patient transported via transport ventilator with oxygen. Ambu bag and mask at [facility]. Patient tolerated well no complications noted. Report and care of patient turned over to Jimbo RT'S .

## 2018-02-26 NOTE — PROGRESS NOTES
Pt received from ED, RN and RT. Pt arrived intubated with 7.5 ETT taped at 26 cm at the lip. Pt was placed on conventional vent with the following settings; AC VC+ [id number] PEEP 530%. Ambu bag and mask at bedside. O2 sats adequate, will continue to monitor.

## 2018-02-26 NOTE — PLAN OF CARE
Problem: Patient Care Overview  Goal: Plan of Care Review  Recommendations  1. When able to extubate, ADAT to Regular with texture per SLP.   2. If unable to extubate, recommend initiating TF of Isosource 1.5 at a goal rate of 45 mL/hr - to provide 1620 kcal/day, 73 g protein/day, and 825 mL free fluid/day.   RD to monitor.    Goals: Patient to receive nutrition by RD follow-up  Nutrition Goal Status: new    Full assessment completed, see RD Consult Note 2/26/18.

## 2018-02-26 NOTE — NURSING
Current vent settings: Rate 24, FiO2 35%, PEEP 5 with sats 94-97%. 1L NaCl bolus given overnight. CXR and CT scan of chest, abd, pelvis completed this morning. Pt's most recent lactic acid 5.1. Pt still paralyzed and sedated to promote ventilator synchrony. Gtts titrated per protocol and pt response. Urine output adequate overnight, but starting to decrease. Pt's significant other and brothers at bedside; updated on plan of care. Mother also called and was updated on pt status via telephone; mother has plans to come in from [place] today. Will continue to monitor.

## 2018-02-26 NOTE — PROGRESS NOTES
[facility]  Critical Care Medicine  Progress Note    Patient Name: Stanislaw Garcia  MRN: [id number]  Admission Date: 2/25/2018  Hospital Length of Stay: 1 days  Code Status: Full Code  Attending Provider: Bharath Everett MD  Primary Care Provider: Primary Doctor No   Principal Problem: Severe asthma with exacerbation    Subjective:     HPI:  Mr. Garcia is a 28 y/o male with history of asthma since childhood who presented to the ED with severe asthma exacerbation with acute hypercapnic and hypoxemic respiratory failure. He reportedly ran out of his albuterol.  History obtained from brother, Luis A, and patient's mother.  Mr. Garcia only takes an albuterol inhaler for his asthma and was reportedly well controlled until December when he presented to the ED with asthma exacerbation.  He was treated in the ED and discharged home.  He reportedly improved following exacerbation; however, again worsened 1 month ago.  Patient's mother reports that he has not been feeling well for 3 days.  No reports of sick contacts. His brother found him at ~3 pm this afternoon in respiratory distress and unresponsive.  His brother reported that he was at his baseline complaining of difficulty breathing 1 hours prior to his brother finding him.   EMS called and reported Spo2 50% on arrival at which time they began providing assisted breaths with an ambu with subsequent improvement in Spo2 90's.  On arrival to ED, he was placed on BiPAP 10/15 with continuous nebs.  He received 125 mg solumedrol, 2 mg Magnesium.  His mental status worsened with somnolence and VBG showing pH 7.0, PaCO2 81.  He was subsequently intubated.      Mr. Garcia lives with his brother Luis A in [place].  He has an older brother who lives in [place], [place], and his mother lives in [place], [place]. He reported smokes cigarettes (1 ppd) with occasional alcohol use.  No reports of drug use. Mr. Garcia does not see a Pulmonologist and family are  not sure who prescribes his albuterol.          Interval History/Significant Events: Worsening lactic acidosis this morning. Overnight Chest xray showed subcutaneous and mediastinal emphysema, correlated with CT scan. Paralyzed and sedated.     Review of Systems   Unable to perform ROS: Intubated     Objective:     Vital Signs (Most Recent):  Temp: 97.7 °F (36.5 °C) (02/26/18 0700)  Pulse: 106 (02/26/18 0900)  Resp: (!) 24 (02/26/18 0900)  BP: (!) 99/56 (02/26/18 0900)  SpO2: 100 % (02/26/18 0900) Vital Signs (24h Range):  Temp:  [97.5 °F (36.4 °C)-98.1 °F (36.7 °C)] 97.7 °F (36.5 °C)  Pulse:  [] 106  Resp:  [9-31] 24  SpO2:  [91 %-100 %] 100 %  BP: ()/() 99/56  Arterial Line BP: ()/(52-71) 94/58   Weight: 50 kg (110 lb 3.7 oz)  Body mass index is 17.79 kg/m².      Intake/Output Summary (Last 24 hours) at 02/26/18 0901  Last data filed at 02/26/18 0900   Gross per 24 hour   Intake             2973 ml   Output             1170 ml   Net             1803 ml       Physical Exam   Constitutional: He is sedated and intubated.   HENT:   Head: Normocephalic and atraumatic.   Eyes: Right pupil is not reactive (constricted bilaterally). Left pupil is not reactive. Pupils are equal.   Neck: JVD present.   Cardiovascular: Normal rate, regular rhythm and normal pulses.    No murmur heard.  Warm extremities   Pulmonary/Chest: Inspiratory and expiratory wheezes diffusely.   No subcutaneous emphysema appreciated   Abdominal: Soft. Bowel sounds are normal. He exhibits no distension. Tenderness:                                                                                                                                                                                Genitourinary:   Genitourinary Comments: Urine catheter with clear, yellow drainage   Musculoskeletal: He exhibits no edema or deformity.   Lymphadenopathy:     He has no cervical adenopathy.   Neurological:   Sedated on propofol due to severe  acute hypercapnic and hypoxemic respiratory failure.  Constricted pupils with no appreciable reactivity. Flaccid paralysis. Diminished reflexes diffusely.    Skin: Skin is warm and dry.   Nursing note and vitals reviewed.      Vents:  Vent Mode: A/C (02/26/18 0846)  Ventilator Initiated: Yes (02/25/18 1751)  Set Rate: 24 bmp (02/26/18 0846)  Vt Set: 380 mL (02/26/18 0846)  Pressure Support: 0 cmH20 (02/26/18 0846)  PEEP/CPAP: 5 cmH20 (02/26/18 0846)  Oxygen Concentration (%): 35 (02/26/18 0900)  Peak Airway Pressure: 27 cmH2O (02/26/18 0846)  Plateau Pressure: 0 cmH20 (02/26/18 0846)  Total Ve: 9.56 mL (02/26/18 0846)  F/VT Ratio<105 (RSBI): (!) 60.45 (02/26/18 0846)  Lines/Drains/Airways     Drain                 NG/OG Tube 02/25/18 1657 [place] sump 18 Fr. Right mouth less than 1 day         Urethral Catheter 02/25/18 1809 Latex 16 Fr. less than 1 day          Airway                 Airway - Non-Surgical 02/25/18 1642 Endotracheal Tube less than 1 day          Arterial Line                 Arterial Line 02/25/18 1930 Left Radial less than 1 day          Peripheral Intravenous Line                 Peripheral IV - Single Lumen 02/25/18 1605 Right Forearm less than 1 day         Peripheral IV - Single Lumen 02/25/18 1606 Left Forearm less than 1 day         Peripheral IV - Single Lumen 02/25/18 2200 Left;Medial Forearm less than 1 day              Significant Labs:    CBC/Anemia Profile:    Recent Labs  Lab 02/25/18  1610 02/25/18  1719 02/25/18  1723 02/26/18  0310   WBC 18.86*  --   --  9.44   HGB 15.7  --   --  13.0*   HCT 48.2 44 43 39.3*     --   --  212   MCV 95  --   --  92   RDW 12.4  --   --  12.6        Chemistries:    Recent Labs  Lab 02/25/18  1610 02/25/18  2345 02/26/18  0310    136 137   K 4.7 4.7 4.9    110 109   CO2 17* 19* 15*   BUN 19 18 16   CREATININE 1.2 0.8 0.9   CALCIUM 9.3 7.4* 8.1*   ALBUMIN 4.2 3.2* 3.4*   PROT 7.8  --  6.2   BILITOT 0.3  --  0.2   ALKPHOS 76  --  58   ALT  22  --  18   AST 32  --  31   MG 2.3  --  2.2   PHOS 7.6* 3.8 3.0       Recent Lab Results       02/26/18  0831 02/26/18  0745 02/26/18  0426 02/26/18  0310 02/26/18  0147      Benzodiazepines          Methadone metabolites          Phencyclidine          Immature Granulocytes    0.3      Immature Grans (Abs)    0.03  Comment:  Mild elevation in immature granulocytes is non specific and   can be seen in a variety of conditions including stress response,   acute inflammation, trauma and pregnancy. Correlation with other   laboratory and clinical findings is essential.        Procalcitonin          Time Notifed:          Provider Notified:          Verbal Result Readback Performed          Albumin    3.4(L)      Alkaline Phosphatase    58      Allens Test   N/A  Pass     ALT    18      Amphetamine Screen, Ur          Anion Gap    13      AST    31      Barbiturate Screen, Ur          Baso #    0.00      Basophil%    0.0      Bilirubin, Direct    0.1      Total Bilirubin    0.2  Comment:  For infants and newborns, interpretation of results should be based  on gestational age, weight and in agreement with clinical  observations.  Premature Infant recommended reference ranges:  Up to 24 hours.............<8.0 mg/dL  Up to 48 hours............<12.0 mg/dL  3-5 days..................<15.0 mg/dL  6-29 days.................<15.0 mg/dL        Site   Sobeida/UAC  Sobeida/UAC     BUN, Bld    16      Calcium    8.1(L)      Chloride    109      CO2    15(L)      Cocaine (Metab.)          CPK MB    10.3(H)      Creatinine    0.9      Creatinine, Random Ur          DelSys   Adult Vent  Adult Vent     Differential Method    Automated      eGFR if     >60.0      eGFR if non     >60.0  Comment:  Calculation used to obtain the estimated glomerular filtration  rate (eGFR) is the CKD-EPI equation.         Eos #    0.0      Eosinophil%    0.0      FiO2   35  35     Glucose    163(H)      Gram Stain (Respiratory)           Gran # (ANC)    8.5(H)      Gran%    90.2(H)      Hematocrit    39.3(L)      Hemoglobin    13.0(L)      Coumadin Monitoring INR          Lactate, Ziyad  3.5  Comment:  Falsely low lactic acid results can be found in samples   containing >=13.0 mg/dL total bilirubin and/or >=3.5 mg/dL   direct bilirubin.  *Critical value -   Results called to and read back by:eric payan rn  ()  5.1  Comment:  Falsely low lactic acid results can be found in samples   containing >=13.0 mg/dL total bilirubin and/or >=3.5 mg/dL   direct bilirubin.  *Critical value -   Results called to and read back by:MOE BELLO RN  ()      Lymph #    0.5(L)      Lymph%    5.6(L)      Magnesium    2.2      MCH    30.5      MCHC    33.1      MCV    92      Min Vol   9.62  8.07     Mode   AC/PRVC  AC/PRVC     Mono #    0.4      Mono%    3.9(L)      MPV    10.0      nRBC    0      Opiate Scrn, Ur          PEEP   5  5     Phosphorus    3.0      PiP   33  25     Platelets    212      POC BE   -11  -11     POC BUN          POC Chloride          POC Creatinine          POC Glucose          POC HCO3   16.6(L)  17.1(L)     POC Hematocrit          POC Ionized Calcium          POC PCO2   37.6  42.0     POC PH   7.254(LL)  7.217(LL)     POC PO2   66(L)  75(L)     POC Potassium          POC SATURATED O2   89(L)  92(L)     POC Sodium          POC TCO2   18(L)  18(L)     POC TCO2 (MEASURED)          POCT Glucose 113(H)         Potassium    4.9      Total Protein    6.2      Protime          Provider Credentials:          Rate   24  20     RBC    4.26(L)      RDW    12.6      Sample   ARTERIAL  ARTERIAL     Sodium    137      Sp02   92  95     Marijuana (THC) Metabolite          Toxicology Information          Troponin I    0.023  Comment:  The reference interval for Troponin I represents the 99th percentile   cutoff   for our facility and is consistent with 3rd generation assay   performance.        Vt   380  380     WBC    9.44                   02/25/18  2345 02/25/18  2247 02/25/18  2243 02/25/18  2150 02/25/18  2110      Benzodiazepines          Methadone metabolites          Phencyclidine          Immature Granulocytes          Immature Grans (Abs)          Procalcitonin          Time Notifed:          Provider Notified:          Verbal Result Readback Performed          Albumin 3.2(L)         Alkaline Phosphatase          Allens Test   Pass       ALT          Amphetamine Screen, Ur          Anion Gap 7(L)         AST          Barbiturate Screen, Ur          Baso #          Basophil%          Bilirubin, Direct          Total Bilirubin          Site   Sobeida/UAC       BUN, Bld 18         Calcium 7.4(L)         Chloride 110         CO2 19(L)         Cocaine (Metab.)          CPK MB          Creatinine 0.8         Creatinine, Random Ur          DelSys   Adult Vent       Differential Method          eGFR if  >60.0         eGFR if non  >60.0  Comment:  Calculation used to obtain the estimated glomerular filtration  rate (eGFR) is the CKD-EPI equation.            Eos #          Eosinophil%          FiO2   30       Glucose 221(H)         Gram Stain (Respiratory)    <10 epithelial cells per low power field.          Many WBC's          Many Gram positive cocci      Gran # (ANC)          Gran%          Hematocrit          Hemoglobin          Coumadin Monitoring INR          Lactate, Ziyad     3.8  Comment:  Falsely low lactic acid results can be found in samples   containing >=13.0 mg/dL total bilirubin and/or >=3.5 mg/dL   direct bilirubin.  *Critical value -   Results called to and read back by:Betty Blanchard RN  ()     Lymph #          Lymph%          Magnesium          MCH          MCHC          MCV          Min Vol   5.54       Mode   AC/PRVC       Mono #          Mono%          MPV          nRBC          Opiate Scrn, Ur          PEEP   5       Phosphorus 3.8         PiP   31       Platelets          POC BE   -8       POC BUN           POC Chloride          POC Creatinine          POC Glucose          POC HCO3   20.5(L)       POC Hematocrit          POC Ionized Calcium          POC PCO2   56.2(HH)       POC PH   7.170(LL)       POC PO2   72(L)       POC Potassium          POC SATURATED O2   89(L)       POC Sodium          POC TCO2   22(L)       POC TCO2 (MEASURED)          POCT Glucose  155(H)        Potassium 4.7         Total Protein          Protime          Provider Credentials:          Rate   14       RBC          RDW          Sample   ARTERIAL       Sodium 136         Sp02   92       Marijuana (THC) Metabolite          Toxicology Information          Troponin I          Vt   380       WBC                      02/25/18  1946 02/25/18  1825 02/25/18  1723 02/25/18  1719 02/25/18  1625      Benzodiazepines  Negative        Methadone metabolites  Negative        Phencyclidine  Negative        Immature Granulocytes          Immature Grans (Abs)          Procalcitonin          Time Notifed:   1723       Provider Notified:   FRANCHESCA       Verbal Result Readback Performed   Yes       Albumin          Alkaline Phosphatase          Allens Test N/A  Pass  N/A     ALT          Amphetamine Screen, Ur  Negative        Anion Gap          AST          Barbiturate Screen, Ur  Negative        Baso #          Basophil%          Bilirubin, Direct          Total Bilirubin          Site Sobeida/UAC  RR  Other     BUN, Bld          Calcium          Chloride          CO2          Cocaine (Metab.)  Negative        CPK MB          Creatinine          Creatinine, Random Ur  50.0  Comment:  The random urine reference ranges provided were established   for 24 hour urine collections.  No reference ranges exist for  random urine specimens.  Correlate clinically.          DelSys Adult Vent  Adult Vent       Differential Method          eGFR if           eGFR if non           Eos #          Eosinophil%          FiO2 50  40       Glucose           Gram Stain (Respiratory)          Gran # (ANC)          Gran%          Hematocrit          Hemoglobin          Coumadin Monitoring INR          Lactate, Rossy          Lymph #          Lymph%          Magnesium          MCH          MCHC          MCV          Min Vol 6.92  8.4       Mode AC/PRVC  AC/PRVC       Mono #          Mono%          MPV          nRBC          Opiate Scrn, Ur  Negative        PEEP 5  5       Phosphorus          PiP 28  23       Platelets          POC BE -5  -6  -7     POC BUN    20      POC Chloride    105      POC Creatinine    0.9      POC Glucose    157(H)      POC HCO3 23.4(L)  24.2  23.6(L)     POC Hematocrit   43 44      POC Ionized Calcium   1.21 1.19      POC PCO2 59.7(HH)  81.2(HH)  81.4(H)     POC PH 7.202(LL)  7.082(LL)  7.070(L)     POC PO2 130(H)  107(H)  55     POC Potassium   4.3 4.3      POC SATURATED O2 98  95  73(L)     POC Sodium   138 140      POC TCO2 25  27  26     POC TCO2 (MEASURED)    25      POCT Glucose          Potassium          Total Protein          Protime          Provider Credentials:   MD       Rate 14  20       RBC          RDW          Sample ARTERIAL  ARTERIAL ROSSY VENOUS     Sodium          Sp02 100  100       Marijuana (THC) Metabolite  Presumptive Positive        Toxicology Information  SEE COMMENT  Comment:  This screen includes the following classes of drugs at the   listed cut-off:  Benzodiazepines                  200 ng/ml  Methadone                        300 ng/ml  Cocaine metabolite               300 ng/ml  Opiates                          300 ng/ml  Barbiturates                     200 ng/ml  Amphetamines                    1000 ng/ml  Marijuana metabs (THC)            50 ng/ml  Phencyclidine (PCP)               25 ng/ml  High concentrations of Diphenhydramine may cross-react with  Phencyclidine PCP screening immunoassay giving a false   positive result.  High concentrations of Methylenedioxymethamphetamine (MDMA aka  Ectasy) and other  structurally similar compounds may cross-   react with the Amphetamine/Methamphetamine screening   immunoassay giving a false positive result.  A metabolite of the anti-HIV drug Sustiva () may cause  false positive results in the Marijuana metabolite (THC)   screening assay.  Note: This exception list includes only more common   interferants in toxicology screen testing.  Because of many   cross-reactantspositive results on toxicology drug screens   should be confirmed whenever results do not correlate with   clinical presentation.  This report is intended for use in clinical monitoring and  management of patients. It is not intended for use in   employment related drug testing.  Because of any cross-reactants, positive results on toxicology  drug screens should be confirmed whenever results do not  correlate with clinical presentation.  Presumptive positive results are unconfirmed and may be used   only for medical purposes.          Troponin I          Vt 380  300       WBC                      02/25/18  1610      Benzodiazepines      Methadone metabolites      Phencyclidine      Immature Granulocytes 1.0(H)     Immature Grans (Abs) 0.19  Comment:  Mild elevation in immature granulocytes is non specific and   can be seen in a variety of conditions including stress response,   acute inflammation, trauma and pregnancy. Correlation with other   laboratory and clinical findings is essential.  (H)     Procalcitonin 0.07  Comment:  A concentration < 0.25 ng/mL represents a low risk bacterial   infection.  Procalcitonin may not be accurate among patients with localized   infection, recent trauma or major surgery, immunosuppressed state,   invasive fungal infection, renal dysfunction. Decisions regarding   initiation or continuation of antibiotic therapy should not be based   solely on procalcitonin levels.       Time Notifed:      Provider Notified:      Verbal Result Readback Performed      Albumin 4.2      Alkaline Phosphatase 76     Allens Test      ALT 22     Amphetamine Screen, Ur      Anion Gap 17(H)     AST 32     Barbiturate Screen, Ur      Baso # 0.06     Basophil% 0.3     Bilirubin, Direct      Total Bilirubin 0.3  Comment:  For infants and newborns, interpretation of results should be based  on gestational age, weight and in agreement with clinical  observations.  Premature Infant recommended reference ranges:  Up to 24 hours.............<8.0 mg/dL  Up to 48 hours............<12.0 mg/dL  3-5 days..................<15.0 mg/dL  6-29 days.................<15.0 mg/dL       Site      BUN, Bld 19     Calcium 9.3     Chloride 105     CO2 17(L)     Cocaine (Metab.)      CPK MB      Creatinine 1.2     Creatinine, Random Ur      DelSys      Differential Method Automated     eGFR if African American >60.0     eGFR if non  >60.0  Comment:  Calculation used to obtain the estimated glomerular filtration  rate (eGFR) is the CKD-EPI equation.        Eos # 0.1     Eosinophil% 0.6     FiO2      Glucose 208(H)     Gram Stain (Respiratory)      Gran # (ANC) 15.0(H)     Gran% 79.4(H)     Hematocrit 48.2     Hemoglobin 15.7     Coumadin Monitoring INR 1.1  Comment:  Coumadin Therapy:  2.0 - 3.0 for INR for all indicators except mechanical heart valves  and antiphospholipid syndromes which should use 2.5 - 3.5.       Lactate, Ziyad      Lymph # 2.4     Lymph% 12.9(L)     Magnesium 2.3     MCH 31.0     MCHC 32.6     MCV 95     Min Vol      Mode      Mono # 1.1(H)     Mono% 5.8     MPV 9.8     nRBC 0     Opiate Scrn, Ur      PEEP      Phosphorus 7.6(H)     PiP      Platelets 286     POC BE      POC BUN      POC Chloride      POC Creatinine      POC Glucose      POC HCO3      POC Hematocrit      POC Ionized Calcium      POC PCO2      POC PH      POC PO2      POC Potassium      POC SATURATED O2      POC Sodium      POC TCO2      POC TCO2 (MEASURED)      POCT Glucose      Potassium 4.7     Total Protein 7.8     Protime  11.1     Provider Credentials:      Rate      RBC 5.06     RDW 12.4     Sample      Sodium 139     Sp02      Marijuana (THC) Metabolite      Toxicology Information      Troponin I 0.028  Comment:  The reference interval for Troponin I represents the 99th percentile   cutoff   for our facility and is consistent with 3rd generation assay   performance.  (H)     Vt      WBC 18.86(H)           Significant Imaging:  Chest xray and CT scan reviewed. Mild subcutaneous emphysema and small amount of air in mediastinum    Assessment/Plan:     Neuro   Encephalopathy, metabolic    Difficult to assess 2/2 sedation and paralytics. Still intubated with rising lactic acidosis although pH improving.     On exam patient with constricted pupils and flaccid paralysis, again 2/2 medications  CTHead negative        Pulmonary   * Severe asthma with exacerbation    --reportedly ran out of his albuterol, was using frequently for a few days prior to presentation.  Unclear if additional trigger.   --received continuous nebulizer for 1 hour, Magnesium, and Methylprednisolone 125 mg IV  -Intubated in ED  --Stopped albuterol, will continue duonebs. Continue Methylprednisolone 80 mg IV Q8  --leukocytosis 18,000, improved after fluids.  Cultures and viral panel negative to date  --Procal negative, less likely pneumonia but given severity of symptoms will continue treating with broad spectrum antibiotics  --paralzyed for better ventilation        Acute respiratory failure with hypoxia and hypercapnia    --see below.  Intubated in ED.        Renal/   Lactic acidosis    Highest level 5.1 overnight  Improved to 3.5 this morning  Given 1L NS this AM and treating with broad spectrum abx for presumed sepsis and pneumonia although no definite radiographic features to suggest this at this time.   Patient has been hypotensive with MAPs in the high 60s but is not on pressors at this time. Tachycardic.   Suspect 2/2 albuterol, minimizing  Giving bicarb for  metabolic acidosis.            Critical Care Daily Checklist:    A: Awake: RASS Goal/Actual Goal:    Actual: Moran Agitation Sedation Scale (RASS): Deep sedation   B: Spontaneous Breathing Trial Performed?     C: SAT & SBT Coordinated?  No, critical condition                 D: Delirium: CAM-ICU     E: Early Mobility Performed? No   F: Feeding Goal:    Status:     Current Diet Order   Procedures    Diet NPO      AS: Analgesia/Sedation Fentanyl, propofol   T: Thromboembolic Prophylaxis Heparin Q8   H: HOB > 300 No   U: Stress Ulcer Prophylaxis (if needed) pepcid   G: Glucose Control LDSSI   B: Bowel Function     I: Indwelling Catheter (Lines & Wood) Necessity Art line  Wood catheter   D: De-escalation of Antimicrobials/Pharmacotherapies Vanc, Zosyn    Plan for the day/ETD Monitor ABGs, try to break bronchoconstriction    Code Status:  Family/Goals of Care: Full Code  Girlfriend at bedside, mother in contact.       Critical secondary to Patient has a condition that poses threat to life and bodily function: Severe Respiratory Distress      Critical care was time spent personally by me on the following activities: development of treatment plan with patient or surrogate and bedside caregivers, discussions with consultants, evaluation of patient's response to treatment, examination of patient, ordering and performing treatments and interventions, ordering and review of laboratory studies, ordering and review of radiographic studies, pulse oximetry, re-evaluation of patient's condition. This critical care time did not overlap with that of any other provider or involve time for any procedures.     Zaki Holloway MD   Internal Medicine PGY-1  [phone]

## 2018-02-26 NOTE — ASSESSMENT & PLAN NOTE
Difficult to assess 2/2 sedation and paralytics. Still intubated with rising lactic acidosis although pH improving.     On exam patient with constricted pupils and flaccid paralysis, again 2/2 medications  CTHead negative

## 2018-02-26 NOTE — PT/OT/SLP PROGRESS
Physical Therapy      Patient Name:  Stanislaw Garcia   MRN:  [id number]    Patient not seen today secondary to Unavailable (Comment). Pt intubated/sedated.  Will follow-up as appropriate.    Raeann Bacon, PT

## 2018-02-27 PROBLEM — D64.9 ANEMIA: Status: ACTIVE | Noted: 2018-02-27

## 2018-02-27 PROBLEM — J45.52 SEVERE PERSISTENT ASTHMA WITH STATUS ASTHMATICUS: Status: ACTIVE | Noted: 2018-02-25

## 2018-02-27 LAB
ALBUMIN SERPL BCP-MCNC: 2.6 G/DL
ALBUMIN SERPL BCP-MCNC: 2.9 G/DL
ALBUMIN SERPL BCP-MCNC: 2.9 G/DL
ALBUMIN SERPL BCP-MCNC: 3 G/DL
ALLENS TEST: ABNORMAL
ALP SERPL-CCNC: 44 U/L
ALT SERPL W/O P-5'-P-CCNC: 17 U/L
ANION GAP SERPL CALC-SCNC: 4 MMOL/L
ANION GAP SERPL CALC-SCNC: 6 MMOL/L
ANION GAP SERPL CALC-SCNC: 6 MMOL/L
AST SERPL-CCNC: 20 U/L
BASOPHILS # BLD AUTO: 0 K/UL
BASOPHILS NFR BLD: 0 %
BILIRUB DIRECT SERPL-MCNC: 0.1 MG/DL
BILIRUB SERPL-MCNC: 0.1 MG/DL
BUN SERPL-MCNC: 10 MG/DL
BUN SERPL-MCNC: 10 MG/DL
BUN SERPL-MCNC: 13 MG/DL
CALCIUM SERPL-MCNC: 7.5 MG/DL
CALCIUM SERPL-MCNC: 8 MG/DL
CALCIUM SERPL-MCNC: 8.1 MG/DL
CHLORIDE SERPL-SCNC: 104 MMOL/L
CHLORIDE SERPL-SCNC: 105 MMOL/L
CHLORIDE SERPL-SCNC: 108 MMOL/L
CO2 SERPL-SCNC: 26 MMOL/L
CO2 SERPL-SCNC: 28 MMOL/L
CO2 SERPL-SCNC: 29 MMOL/L
CREAT SERPL-MCNC: 0.7 MG/DL
CREAT SERPL-MCNC: 0.7 MG/DL
CREAT SERPL-MCNC: 0.8 MG/DL
DELSYS: ABNORMAL
DIFFERENTIAL METHOD: ABNORMAL
EOSINOPHIL # BLD AUTO: 0 K/UL
EOSINOPHIL NFR BLD: 0 %
ERYTHROCYTE [DISTWIDTH] IN BLOOD BY AUTOMATED COUNT: 13 %
ERYTHROCYTE [SEDIMENTATION RATE] IN BLOOD BY WESTERGREN METHOD: 14 MM/H
ERYTHROCYTE [SEDIMENTATION RATE] IN BLOOD BY WESTERGREN METHOD: 16 MM/H
ERYTHROCYTE [SEDIMENTATION RATE] IN BLOOD BY WESTERGREN METHOD: 18 MM/H
EST. GFR  (AFRICAN AMERICAN): >60 ML/MIN/1.73 M^2
EST. GFR  (NON AFRICAN AMERICAN): >60 ML/MIN/1.73 M^2
FIO2: 30
FIO2: 40
FIO2: 50
FIO2: 50
GLUCOSE SERPL-MCNC: 108 MG/DL
GLUCOSE SERPL-MCNC: 158 MG/DL
GLUCOSE SERPL-MCNC: 181 MG/DL
HAPTOGLOB SERPL-MCNC: 107 MG/DL
HCO3 UR-SCNC: 26.2 MMOL/L (ref 24–28)
HCO3 UR-SCNC: 28.4 MMOL/L (ref 24–28)
HCO3 UR-SCNC: 29.5 MMOL/L (ref 24–28)
HCO3 UR-SCNC: 31.6 MMOL/L (ref 24–28)
HCO3 UR-SCNC: 32.5 MMOL/L (ref 24–28)
HCO3 UR-SCNC: 39.8 MMOL/L (ref 24–28)
HCT VFR BLD AUTO: 34.6 %
HGB BLD-MCNC: 11.3 G/DL
IMM GRANULOCYTES # BLD AUTO: 0.06 K/UL
IMM GRANULOCYTES NFR BLD AUTO: 0.4 %
LACTATE SERPL-SCNC: 1.6 MMOL/L
LACTATE SERPL-SCNC: 2.8 MMOL/L
LDH SERPL L TO P-CCNC: 220 U/L
LYMPHOCYTES # BLD AUTO: 0.6 K/UL
LYMPHOCYTES NFR BLD: 4.2 %
MAGNESIUM SERPL-MCNC: 2 MG/DL
MAGNESIUM SERPL-MCNC: 2 MG/DL
MCH RBC QN AUTO: 30.1 PG
MCHC RBC AUTO-ENTMCNC: 32.7 G/DL
MCV RBC AUTO: 92 FL
MIN VOL: 6.8
MIN VOL: 7
MODE: ABNORMAL
MONOCYTES # BLD AUTO: 0.8 K/UL
MONOCYTES NFR BLD: 5.8 %
NEUTROPHILS # BLD AUTO: 12.8 K/UL
NEUTROPHILS NFR BLD: 89.6 %
NRBC BLD-RTO: 0 /100 WBC
PCO2 BLDA: 49.9 MMHG (ref 35–45)
PCO2 BLDA: 56.5 MMHG (ref 35–45)
PCO2 BLDA: 57.6 MMHG (ref 35–45)
PCO2 BLDA: 60 MMHG (ref 35–45)
PCO2 BLDA: 75.7 MMHG (ref 35–45)
PCO2 BLDA: 93.9 MMHG (ref 35–45)
PEEP: 10
PEEP: 12
PEEP: 5
PH SMN: 7.15 [PH] (ref 7.35–7.45)
PH SMN: 7.28 [PH] (ref 7.35–7.45)
PH SMN: 7.32 [PH] (ref 7.35–7.45)
PH SMN: 7.33 [PH] (ref 7.35–7.45)
PH SMN: 7.33 [PH] (ref 7.35–7.45)
PH SMN: 7.36 [PH] (ref 7.35–7.45)
PHOSPHATE SERPL-MCNC: 2.1 MG/DL
PHOSPHATE SERPL-MCNC: 2.3 MG/DL
PHOSPHATE SERPL-MCNC: 2.5 MG/DL
PHOSPHATE SERPL-MCNC: 3.1 MG/DL
PIP: 53
PIP: 53
PLATELET # BLD AUTO: 197 K/UL
PMV BLD AUTO: 10.2 FL
PO2 BLDA: 107 MMHG (ref 80–100)
PO2 BLDA: 116 MMHG (ref 80–100)
PO2 BLDA: 70 MMHG (ref 80–100)
PO2 BLDA: 87 MMHG (ref 80–100)
PO2 BLDA: 88 MMHG (ref 80–100)
PO2 BLDA: 91 MMHG (ref 80–100)
POC BE: 0 MMOL/L
POC BE: 14 MMOL/L
POC BE: 2 MMOL/L
POC BE: 3 MMOL/L
POC BE: 4 MMOL/L
POC BE: 6 MMOL/L
POC SATURATED O2: 92 % (ref 95–100)
POC SATURATED O2: 95 % (ref 95–100)
POC SATURATED O2: 96 % (ref 95–100)
POC SATURATED O2: 98 % (ref 95–100)
POC TCO2: 28 MMOL/L (ref 23–27)
POC TCO2: 30 MMOL/L (ref 23–27)
POC TCO2: 31 MMOL/L (ref 23–27)
POC TCO2: 33 MMOL/L (ref 23–27)
POC TCO2: 35 MMOL/L (ref 23–27)
POC TCO2: 42 MMOL/L (ref 23–27)
POCT GLUCOSE: 126 MG/DL (ref 70–110)
POCT GLUCOSE: 131 MG/DL (ref 70–110)
POCT GLUCOSE: 150 MG/DL (ref 70–110)
POCT GLUCOSE: 160 MG/DL (ref 70–110)
POTASSIUM SERPL-SCNC: 4.5 MMOL/L
POTASSIUM SERPL-SCNC: 4.6 MMOL/L
POTASSIUM SERPL-SCNC: 4.9 MMOL/L
PROT SERPL-MCNC: 5.4 G/DL
RBC # BLD AUTO: 3.76 M/UL
RETICS/RBC NFR AUTO: 1.1 %
SAMPLE: ABNORMAL
SITE: ABNORMAL
SODIUM SERPL-SCNC: 137 MMOL/L
SODIUM SERPL-SCNC: 139 MMOL/L
SODIUM SERPL-SCNC: 140 MMOL/L
SP02: 100
SP02: 95
SP02: 96
SP02: 97
SP02: 99
VANCOMYCIN SERPL-MCNC: 9.6 UG/ML
VANCOMYCIN TROUGH SERPL-MCNC: 3.6 UG/ML
VT: 300
VT: 340
VT: 360
WBC # BLD AUTO: 14.24 K/UL

## 2018-02-27 PROCEDURE — 99900026 HC AIRWAY MAINTENANCE (STAT)

## 2018-02-27 PROCEDURE — 37799 UNLISTED PX VASCULAR SURGERY: CPT

## 2018-02-27 PROCEDURE — 63600175 PHARM REV CODE 636 W HCPCS: Performed by: INTERNAL MEDICINE

## 2018-02-27 PROCEDURE — 83605 ASSAY OF LACTIC ACID: CPT | Mod: 91

## 2018-02-27 PROCEDURE — 94003 VENT MGMT INPAT SUBQ DAY: CPT

## 2018-02-27 PROCEDURE — 63600175 PHARM REV CODE 636 W HCPCS: Performed by: NURSE PRACTITIONER

## 2018-02-27 PROCEDURE — 25000242 PHARM REV CODE 250 ALT 637 W/ HCPCS: Performed by: NURSE PRACTITIONER

## 2018-02-27 PROCEDURE — 63600175 PHARM REV CODE 636 W HCPCS: Performed by: EMERGENCY MEDICINE

## 2018-02-27 PROCEDURE — 25000003 PHARM REV CODE 250: Performed by: NURSE PRACTITIONER

## 2018-02-27 PROCEDURE — 82803 BLOOD GASES ANY COMBINATION: CPT

## 2018-02-27 PROCEDURE — 84100 ASSAY OF PHOSPHORUS: CPT

## 2018-02-27 PROCEDURE — 94640 AIRWAY INHALATION TREATMENT: CPT

## 2018-02-27 PROCEDURE — 83735 ASSAY OF MAGNESIUM: CPT

## 2018-02-27 PROCEDURE — 63600175 PHARM REV CODE 636 W HCPCS: Performed by: STUDENT IN AN ORGANIZED HEALTH CARE EDUCATION/TRAINING PROGRAM

## 2018-02-27 PROCEDURE — 25000003 PHARM REV CODE 250: Performed by: STUDENT IN AN ORGANIZED HEALTH CARE EDUCATION/TRAINING PROGRAM

## 2018-02-27 PROCEDURE — A4216 STERILE WATER/SALINE, 10 ML: HCPCS | Performed by: NURSE PRACTITIONER

## 2018-02-27 PROCEDURE — 83615 LACTATE (LD) (LDH) ENZYME: CPT

## 2018-02-27 PROCEDURE — 83010 ASSAY OF HAPTOGLOBIN QUANT: CPT

## 2018-02-27 PROCEDURE — 63600175 PHARM REV CODE 636 W HCPCS: Performed by: GENERAL ACUTE CARE HOSPITAL

## 2018-02-27 PROCEDURE — 99291 CRITICAL CARE FIRST HOUR: CPT | Mod: ,,, | Performed by: INTERNAL MEDICINE

## 2018-02-27 PROCEDURE — 25000242 PHARM REV CODE 250 ALT 637 W/ HCPCS: Performed by: GENERAL ACUTE CARE HOSPITAL

## 2018-02-27 PROCEDURE — 25000003 PHARM REV CODE 250

## 2018-02-27 PROCEDURE — 80202 ASSAY OF VANCOMYCIN: CPT | Mod: 91

## 2018-02-27 PROCEDURE — 85025 COMPLETE CBC W/AUTO DIFF WBC: CPT

## 2018-02-27 PROCEDURE — 99900035 HC TECH TIME PER 15 MIN (STAT)

## 2018-02-27 PROCEDURE — 83735 ASSAY OF MAGNESIUM: CPT | Mod: 91

## 2018-02-27 PROCEDURE — 80069 RENAL FUNCTION PANEL: CPT

## 2018-02-27 PROCEDURE — 85045 AUTOMATED RETICULOCYTE COUNT: CPT

## 2018-02-27 PROCEDURE — 80076 HEPATIC FUNCTION PANEL: CPT

## 2018-02-27 PROCEDURE — 83605 ASSAY OF LACTIC ACID: CPT

## 2018-02-27 PROCEDURE — 25000242 PHARM REV CODE 250 ALT 637 W/ HCPCS: Performed by: INTERNAL MEDICINE

## 2018-02-27 PROCEDURE — 80202 ASSAY OF VANCOMYCIN: CPT

## 2018-02-27 PROCEDURE — 80069 RENAL FUNCTION PANEL: CPT | Mod: 91

## 2018-02-27 PROCEDURE — 20000000 HC ICU ROOM

## 2018-02-27 PROCEDURE — 63600175 PHARM REV CODE 636 W HCPCS

## 2018-02-27 PROCEDURE — 94761 N-INVAS EAR/PLS OXIMETRY MLT: CPT

## 2018-02-27 PROCEDURE — 25000003 PHARM REV CODE 250: Performed by: GENERAL ACUTE CARE HOSPITAL

## 2018-02-27 PROCEDURE — 94760 N-INVAS EAR/PLS OXIMETRY 1: CPT

## 2018-02-27 PROCEDURE — 99292 CRITICAL CARE ADDL 30 MIN: CPT | Mod: ,,, | Performed by: INTERNAL MEDICINE

## 2018-02-27 RX ORDER — INDOMETHACIN 25 MG/1
CAPSULE ORAL
Status: COMPLETED
Start: 2018-02-27 | End: 2018-02-27

## 2018-02-27 RX ORDER — SODIUM,POTASSIUM PHOSPHATES 280-250MG
2 POWDER IN PACKET (EA) ORAL EVERY 4 HOURS
Status: COMPLETED | OUTPATIENT
Start: 2018-02-27 | End: 2018-02-27

## 2018-02-27 RX ORDER — LORAZEPAM 2 MG/ML
INJECTION INTRAMUSCULAR
Status: COMPLETED
Start: 2018-02-27 | End: 2018-02-27

## 2018-02-27 RX ORDER — DOCUSATE SODIUM 50 MG/5ML
100 LIQUID ORAL 2 TIMES DAILY
Status: DISCONTINUED | OUTPATIENT
Start: 2018-02-27 | End: 2018-02-28

## 2018-02-27 RX ORDER — AMOXICILLIN 250 MG
2 CAPSULE ORAL 2 TIMES DAILY
Status: DISCONTINUED | OUTPATIENT
Start: 2018-02-27 | End: 2018-02-27

## 2018-02-27 RX ORDER — METHYLPREDNISOLONE SOD SUCC 125 MG
125 VIAL (EA) INJECTION EVERY 6 HOURS
Status: DISCONTINUED | OUTPATIENT
Start: 2018-02-27 | End: 2018-03-01

## 2018-02-27 RX ORDER — LORAZEPAM 2 MG/ML
2 INJECTION INTRAMUSCULAR ONCE
Status: COMPLETED | OUTPATIENT
Start: 2018-02-27 | End: 2018-02-27

## 2018-02-27 RX ORDER — ALBUTEROL SULFATE 90 UG/1
4 AEROSOL, METERED RESPIRATORY (INHALATION) EVERY 4 HOURS
Status: DISCONTINUED | OUTPATIENT
Start: 2018-02-27 | End: 2018-03-01

## 2018-02-27 RX ORDER — BUDESONIDE 0.5 MG/2ML
0.5 INHALANT ORAL EVERY 12 HOURS
Status: DISCONTINUED | OUTPATIENT
Start: 2018-02-27 | End: 2018-02-27

## 2018-02-27 RX ORDER — METHYLPREDNISOLONE SOD SUCC 125 MG
80 VIAL (EA) INJECTION EVERY 6 HOURS
Status: DISCONTINUED | OUTPATIENT
Start: 2018-02-27 | End: 2018-02-27

## 2018-02-27 RX ORDER — DOCUSATE SODIUM 50 MG/5ML
100 LIQUID ORAL 2 TIMES DAILY
Status: DISCONTINUED | OUTPATIENT
Start: 2018-02-27 | End: 2018-02-27

## 2018-02-27 RX ORDER — GLUCAGON 1 MG
1 KIT INJECTION
Status: DISCONTINUED | OUTPATIENT
Start: 2018-02-27 | End: 2018-03-04 | Stop reason: HOSPADM

## 2018-02-27 RX ORDER — DIPHENHYDRAMINE HYDROCHLORIDE 50 MG/ML
50 INJECTION INTRAMUSCULAR; INTRAVENOUS ONCE
Status: COMPLETED | OUTPATIENT
Start: 2018-02-27 | End: 2018-02-27

## 2018-02-27 RX ORDER — ENOXAPARIN SODIUM 100 MG/ML
40 INJECTION SUBCUTANEOUS EVERY 24 HOURS
Status: DISCONTINUED | OUTPATIENT
Start: 2018-02-27 | End: 2018-03-04 | Stop reason: HOSPADM

## 2018-02-27 RX ORDER — INDOMETHACIN 25 MG/1
50 CAPSULE ORAL ONCE
Status: COMPLETED | OUTPATIENT
Start: 2018-02-27 | End: 2018-02-27

## 2018-02-27 RX ORDER — FAMOTIDINE 40 MG/5ML
20 POWDER, FOR SUSPENSION ORAL 2 TIMES DAILY
Status: DISCONTINUED | OUTPATIENT
Start: 2018-02-27 | End: 2018-02-27

## 2018-02-27 RX ORDER — INSULIN ASPART 100 [IU]/ML
0-5 INJECTION, SOLUTION INTRAVENOUS; SUBCUTANEOUS EVERY 6 HOURS PRN
Status: DISCONTINUED | OUTPATIENT
Start: 2018-02-27 | End: 2018-03-04 | Stop reason: HOSPADM

## 2018-02-27 RX ORDER — MAGNESIUM SULFATE/D5W 2 G/50 ML
2 INTRAVENOUS SOLUTION, PIGGYBACK (ML) INTRAVENOUS ONCE
Status: COMPLETED | OUTPATIENT
Start: 2018-02-27 | End: 2018-02-27

## 2018-02-27 RX ORDER — FUROSEMIDE 10 MG/ML
40 INJECTION INTRAMUSCULAR; INTRAVENOUS ONCE
Status: COMPLETED | OUTPATIENT
Start: 2018-02-27 | End: 2018-02-27

## 2018-02-27 RX ORDER — ALBUTEROL SULFATE 90 UG/1
2 AEROSOL, METERED RESPIRATORY (INHALATION) EVERY 4 HOURS
Status: DISCONTINUED | OUTPATIENT
Start: 2018-02-27 | End: 2018-02-27

## 2018-02-27 RX ORDER — MAGNESIUM SULFATE/D5W 2 G/50 ML
2 INTRAVENOUS SOLUTION, PIGGYBACK (ML) INTRAVENOUS ONCE
Status: DISCONTINUED | OUTPATIENT
Start: 2018-02-27 | End: 2018-02-27

## 2018-02-27 RX ORDER — FAMOTIDINE 20 MG/1
20 TABLET, FILM COATED ORAL 2 TIMES DAILY
Status: DISCONTINUED | OUTPATIENT
Start: 2018-02-27 | End: 2018-03-01

## 2018-02-27 RX ADMIN — HEPARIN SODIUM 5000 UNITS: 5000 INJECTION, SOLUTION INTRAVENOUS; SUBCUTANEOUS at 05:02

## 2018-02-27 RX ADMIN — FAMOTIDINE 20 MG: 20 TABLET, FILM COATED ORAL at 09:02

## 2018-02-27 RX ADMIN — AZITHROMYCIN 500 MG: 500 INJECTION, POWDER, LYOPHILIZED, FOR SOLUTION INTRAVENOUS at 11:02

## 2018-02-27 RX ADMIN — MINERAL OIL AND WHITE PETROLATUM: 150; 830 OINTMENT OPHTHALMIC at 05:02

## 2018-02-27 RX ADMIN — CHLORHEXIDINE GLUCONATE 15 ML: 1.2 RINSE ORAL at 09:02

## 2018-02-27 RX ADMIN — POTASSIUM & SODIUM PHOSPHATES POWDER PACK 280-160-250 MG 2 PACKET: 280-160-250 PACK at 06:02

## 2018-02-27 RX ADMIN — LORAZEPAM 2 MG: 2 INJECTION INTRAMUSCULAR; INTRAVENOUS at 05:02

## 2018-02-27 RX ADMIN — IPRATROPIUM BROMIDE AND ALBUTEROL SULFATE 3 ML: .5; 3 SOLUTION RESPIRATORY (INHALATION) at 03:02

## 2018-02-27 RX ADMIN — Medication 3 ML: at 01:02

## 2018-02-27 RX ADMIN — Medication 3 ML: at 09:02

## 2018-02-27 RX ADMIN — VANCOMYCIN HYDROCHLORIDE 1250 MG: 1 INJECTION, POWDER, LYOPHILIZED, FOR SOLUTION INTRAVENOUS at 09:02

## 2018-02-27 RX ADMIN — SODIUM BICARBONATE 50 MEQ: 84 INJECTION, SOLUTION INTRAVENOUS at 06:02

## 2018-02-27 RX ADMIN — PIPERACILLIN AND TAZOBACTAM 4.5 G: 4; .5 INJECTION, POWDER, LYOPHILIZED, FOR SOLUTION INTRAVENOUS; PARENTERAL at 06:02

## 2018-02-27 RX ADMIN — POTASSIUM & SODIUM PHOSPHATES POWDER PACK 280-160-250 MG 2 PACKET: 280-160-250 PACK at 09:02

## 2018-02-27 RX ADMIN — MINERAL OIL AND WHITE PETROLATUM: 150; 830 OINTMENT OPHTHALMIC at 01:02

## 2018-02-27 RX ADMIN — SODIUM CHLORIDE 230 MG: 9 INJECTION, SOLUTION INTRAVENOUS at 06:02

## 2018-02-27 RX ADMIN — CISATRACURIUM BESYLATE 4 MCG/KG/MIN: 10 INJECTION INTRAVENOUS at 09:02

## 2018-02-27 RX ADMIN — ENOXAPARIN SODIUM 40 MG: 100 INJECTION SUBCUTANEOUS at 04:02

## 2018-02-27 RX ADMIN — METHYLPREDNISOLONE SODIUM SUCCINATE 80 MG: 125 INJECTION, POWDER, FOR SOLUTION INTRAMUSCULAR; INTRAVENOUS at 05:02

## 2018-02-27 RX ADMIN — LORAZEPAM 2 MG: 2 INJECTION INTRAMUSCULAR at 05:02

## 2018-02-27 RX ADMIN — DOCUSATE SODIUM 100 MG: 50 LIQUID ORAL at 09:02

## 2018-02-27 RX ADMIN — Medication 3 ML: at 06:02

## 2018-02-27 RX ADMIN — PROPOFOL 30 MCG/KG/MIN: 10 INJECTION, EMULSION INTRAVENOUS at 10:02

## 2018-02-27 RX ADMIN — IPRATROPIUM BROMIDE 2 PUFF: 17 AEROSOL, METERED RESPIRATORY (INHALATION) at 03:02

## 2018-02-27 RX ADMIN — PROPOFOL 50 MCG/KG/MIN: 10 INJECTION, EMULSION INTRAVENOUS at 12:02

## 2018-02-27 RX ADMIN — VANCOMYCIN HYDROCHLORIDE 750 MG: 1 INJECTION, POWDER, LYOPHILIZED, FOR SOLUTION INTRAVENOUS at 05:02

## 2018-02-27 RX ADMIN — PIPERACILLIN AND TAZOBACTAM 4.5 G: 4; .5 INJECTION, POWDER, LYOPHILIZED, FOR SOLUTION INTRAVENOUS; PARENTERAL at 10:02

## 2018-02-27 RX ADMIN — METHYLPREDNISOLONE SODIUM SUCCINATE 125 MG: 125 INJECTION, POWDER, FOR SOLUTION INTRAMUSCULAR; INTRAVENOUS at 05:02

## 2018-02-27 RX ADMIN — Medication 200 MCG/HR: at 04:02

## 2018-02-27 RX ADMIN — ALBUTEROL SULFATE 4 PUFF: 90 AEROSOL, METERED RESPIRATORY (INHALATION) at 06:02

## 2018-02-27 RX ADMIN — ALBUTEROL SULFATE 2 PUFF: 90 AEROSOL, METERED RESPIRATORY (INHALATION) at 10:02

## 2018-02-27 RX ADMIN — FUROSEMIDE 40 MG: 10 INJECTION, SOLUTION INTRAMUSCULAR; INTRAVENOUS at 06:02

## 2018-02-27 RX ADMIN — METHYLPREDNISOLONE SODIUM SUCCINATE 125 MG: 125 INJECTION, POWDER, FOR SOLUTION INTRAMUSCULAR; INTRAVENOUS at 11:02

## 2018-02-27 RX ADMIN — ALBUTEROL SULFATE 2 PUFF: 90 AEROSOL, METERED RESPIRATORY (INHALATION) at 03:02

## 2018-02-27 RX ADMIN — AZITHROMYCIN 500 MG: 500 INJECTION, POWDER, LYOPHILIZED, FOR SOLUTION INTRAVENOUS at 12:02

## 2018-02-27 RX ADMIN — INDOMETHACIN 50 MEQ: 25 CAPSULE ORAL at 06:02

## 2018-02-27 RX ADMIN — DIPHENHYDRAMINE HYDROCHLORIDE 50 MG: 50 INJECTION, SOLUTION INTRAMUSCULAR; INTRAVENOUS at 07:02

## 2018-02-27 RX ADMIN — Medication 2 G: at 06:02

## 2018-02-27 RX ADMIN — CISATRACURIUM BESYLATE 3.5 MCG/KG/MIN: 10 INJECTION INTRAVENOUS at 01:02

## 2018-02-27 RX ADMIN — VANCOMYCIN HYDROCHLORIDE 750 MG: 1 INJECTION, POWDER, LYOPHILIZED, FOR SOLUTION INTRAVENOUS at 01:02

## 2018-02-27 RX ADMIN — MINERAL OIL AND WHITE PETROLATUM: 150; 830 OINTMENT OPHTHALMIC at 09:02

## 2018-02-27 RX ADMIN — PROPOFOL 50 MCG/KG/MIN: 10 INJECTION, EMULSION INTRAVENOUS at 09:02

## 2018-02-27 RX ADMIN — Medication 2 G: at 10:02

## 2018-02-27 RX ADMIN — SODIUM CHLORIDE 20 ML/HR: 9 INJECTION, SOLUTION INTRAVENOUS at 07:02

## 2018-02-27 RX ADMIN — ALBUTEROL SULFATE 4 PUFF: 90 AEROSOL, METERED RESPIRATORY (INHALATION) at 09:02

## 2018-02-27 RX ADMIN — SODIUM BICARBONATE: 84 INJECTION, SOLUTION INTRAVENOUS at 06:02

## 2018-02-27 RX ADMIN — CHLORHEXIDINE GLUCONATE 15 ML: 1.2 RINSE ORAL at 08:02

## 2018-02-27 RX ADMIN — BUDESONIDE 0.5 MG: 0.5 INHALANT RESPIRATORY (INHALATION) at 07:02

## 2018-02-27 NOTE — PLAN OF CARE
"Problem: Spiritual Distress, Risk/Actual (Adult,Obstetrics,Pediatric)  Intervention: Facilitate Personal Exploration/Expression of Spirituality  F - Stacy and Belief: Pt and pt's family of Taoist stacy.     I - Importance: Unable to assess pt. Very important to pt's mom.     C - Community: Pt's mom lives in [place]. She does have a stacy community there. Pt and other family live in [place].     A - Address in Care: Introduced and offered pastoral support with reflective listening and assessment of spiritual resources for pt's mom. Pt sedated. Pt's mom expressed an internal strength with acceptance born through interpersonal challenges and season of loss and grief. "My stacy keeps me strong, and I've learned to accept God's will." Mom was thankful for pastoral support and made aware of 's presence as needed.           "

## 2018-02-27 NOTE — PROCEDURES
"Stanislaw Garcia is a 27 y.o. male patient.    Temp: 97.4 °F (36.3 °C) (02/26/18 1900)  Pulse: 108 (02/26/18 2200)  Resp: 18 (02/26/18 2200)  BP: (!) 110/56 (02/26/18 2200)  SpO2: (!) 94 % (02/26/18 2200)  Weight: 50 kg (110 lb 3.7 oz) (reviewed) (02/26/18 1103)  Height: 5' 6" (167.6 cm) (reviewed) (02/26/18 1103)       Central Line  Date/Time: 2/26/2018 10:54 PM  Location procedure was performed: [facility] SURGICAL ICU (SICU)  Performed by: SCOTTY HUDDLESTON  Pre-operative Diagnosis: status asthmaticus  Post-operative diagnosis: status asthmaticus  Consent Done: Yes  Time out: Immediately prior to procedure a "time out" was called to verify the correct patient, procedure, equipment, support staff and site/side marked as required.  Indications: med administration  Preparation: skin prepped with ChloraPrep  Skin prep agent dried: skin prep agent completely dried prior to procedure  Sterile barriers: all five maximum sterile barriers used - cap, mask, sterile gown, sterile gloves, and large sterile sheet  Hand hygiene: hand hygiene performed prior to central venous catheter insertion  Location details: right internal jugular  Catheter type: trialysis  Catheter size: 12 Fr  Catheter Length: 16cm    Ultrasound guidance: yes  Vessel Caliber: medium, patent, compressibility normal  Needle advanced into vessel with real time Ultrasound guidance.  Guidewire confirmed in vessel.  Sterile sheath used.  Manometry: Yes  Number of attempts: 2  Assessment: placement verified by x-ray,  no pneumothorax on x-ray and successful placement  Complications: none  Estimated blood loss (mL): 5  Specimens: No  Implants: No  Post-procedure: line sutured,  chlorhexidine patch,  sterile dressing applied and blood return through all ports  Complications: No          Scotty Huddleston  2/26/2018  "

## 2018-02-27 NOTE — NURSING
Pt had 3 adverse episodes to albuterol treatments overnight, requiring ambu-bagging of pt and start of levo. Episodes resolve after about one minute of ambu-bag. Albuterol treatments discontinued per NP Judy Sheldon. Vital signs currently stable. Vent settings: Rate 18, Vt 360, 40%, PEEP 5. Sedation weaned down due to BIS 30s. Nimbex currently infusing @ 3 with TOF 1-2 out of 4. Urine output increased from 30s to 100s more recently. Right IJ trialysis catheter placed overnight. Pt's mother at bedside. Updated on plan of care and pt status. Will continue to monitor.

## 2018-02-27 NOTE — ASSESSMENT & PLAN NOTE
Difficult to assess 2/2 sedation and paralytics. Still intubated with rising lactic acidosis although pH improving.   Unresponsive with brother at home but apparently combative in ambulance and awake.     On exam patient with constricted pupils and flaccid paralysis, again 2/2 medications  CTHead negative

## 2018-02-27 NOTE — ASSESSMENT & PLAN NOTE
--reportedly ran out of his albuterol, was requiring large quantities without relief at home the few days prior to admission.   --received continuous nebulizer for 1 hour, Magnesium, and Methylprednisolone 125 mg IV  -Intubated in ED    Still wheezing and hypercapnic. Will minimize albuterol treatments to limit acidosis.   Continuing bicarb drip started yesterday to correct metabolic acidosis (good response this AM).   --Will continue scheduled Methylprednisolone 80 mg Q8  --leukocytosis 18,000 on admission, improved after fluids. Rising today in setting of steroid use. Cultures and viral panel negative to date  --Procal negative, less likely pneumonia but given severity of symptoms will continue treating with broad spectrum antibiotics. Repeating CXR today  --paralzyed for better ventilation, will continue given severity of bronchospasm

## 2018-02-27 NOTE — ASSESSMENT & PLAN NOTE
Acute Hgb drop from 15.7 to 11.3  Patient has received 4 L NS in addition to >2L given with other medications  Suspect dilutional but acute blood loss and lysis also in differential. No blood loss witnessed, no BMs since admission. Believe blood would have been visualized. Hemolysis labs sent although suspicion lower.

## 2018-02-27 NOTE — PROCEDURES
DATE OF SERVICE:  02/26/2018    Duration is 32 minutes, 15 seconds.    ELECTROENCEPHALOGRAM REPORT    METHODOLOGY:  Electroencephalographic (EEG) recording is recorded with   electrodes placed according to the International 10-20 placement system.  Thirty   two (32) channels of digital signal (sampling rate of 512/sec), including T1   and T2, were simultaneously recorded from the scalp and may include EKG, EMG,   and/or eye monitors.  Recording band pass was 0.1 to 512 Hz.  Digital video   recording of the patient is simultaneously recorded with the EEG.  The patient   is instructed to report clinical symptoms which may occur during the recording   session.  EEG and video recording are stored and archived in digital format.    Activation procedures, which include photic stimulation, hyperventilation and   instructing patients to perform simple tasks, are done in selected patients.    The EEG is displayed on a monitor screen and can be reviewed using different   montages.  Computer assisted-analysis is employed to detect spike and   electrographic seizure activity.  The entire record is submitted for computer   analysis.  The entire recording is visually reviewed, and the times identified   by computer analysis as being spikes or seizures are reviewed again.    Compressed spectral analysis (CSA) is also performed on the activity recorded   from each individual channel.  This is displayed as a power display of   frequencies from 0 to 30 Hz over time.  The CSA is reviewed looking for   asymmetries in power between homologous areas of the scalp, then compared with   the original EEG recording.    Uni-Control software was also utilized in the review of this study.  This software   suite analyzes the EEG recording in multiple domains.  Coherence and rhythmicity   are computed to identify EEG sections which may contain organized seizures.    Each channel undergoes analysis to detect the presence of spike and sharp waves    which have special and morphological characteristics of epileptic activity.  The   routine EEG recording is converted from special into frequency domain.  This is   then displayed comparing homologous areas to identify areas of significant   asymmetry.  Algorithm to identify non-cortically generated artifact is used to   separate artifact from the EEG.    EEG FINDINGS:  This record consists primarily of low-amplitude delta activity in   the 1 to 2 Hz range in the background throughout the entire study.  This is   polymorphic with occasional semi-rhythmic features.  There is variably seen   superimposed fast activity in the alpha and beta frequency bands seen frontally   and centrally on and off throughout the study.  This is the main variable   feature of the exam, which otherwise exhibits relatively continuous 1 to 2 Hz   delta activity globally throughout.  There are no epileptiform discharges or   seizures.  There are no focal findings.  The patient is noted to be sedated with   propofol and fentanyl.    INTERPRETATION:  Abnormal EEG due to moderately severe encephalopathy, which may   be at least in part related to anesthetic sedation.  There were no focal   features and no evidence of seizures.      UMA/BREANNA  dd: 02/26/2018 14:52:27 (CST)  td: 02/26/2018 20:09:08 (CST)  Doc ID   #[id number]  Job ID #[id number]    CC:    intact

## 2018-02-27 NOTE — PT/OT/SLP PROGRESS
Occupational Therapy      Patient Name:  Stanislaw Garcia   MRN:  [id number]    Patient not seen today secondary to intubated/sedated/medically paralyzed. Pt not appropriate for skilled OT services d/t medical lability. Spoke with RN who agrees with POC for OT to sign off until reconsulted by MD  .     CRYS Miramontes  2/27/2018

## 2018-02-27 NOTE — PROGRESS NOTES
Started Budesonide respiratory treatment in-line with Pb840 ventilator. Pt became tachypneic and tidal volumes ranging from . Bagged and lavaged pt with saline. Vent filters changed. MD notified and assessed pt at bedside. Vent data and vitals stable again. SpO2 100%. Will continue to monitor closely.

## 2018-02-27 NOTE — NURSING
Dr. Marvin and RRT at the bedside. Pt had a similar bronchospastic episode after receiving breathing treatment that resulted in BP decrease and HR increase. Vent settings adjusted per Dr. Marvin.

## 2018-02-27 NOTE — PROGRESS NOTES
Pt SpO2 dropped into upper 70's and tachycardia increasing. Pt taken off vent, bagged via Ambu and lavaged with saline. MD called to bedside. 4 puffs of albuterol inhaler and 4 puffs of atrovent inhaler administered in-line with ventilator per Dr. Everett's order. Vent settings adjusted by Dr. Patel. Will continue to monitor pt very closely.

## 2018-02-27 NOTE — SUBJECTIVE & OBJECTIVE
Interval History/Significant Events: Episodes of bronchospasm associated with albuterol inhalation overnight. Resolved with bagging. Further bronchoconstrictors stopped overnight.  Another episode this morning with budesonide treatment. Patient did not become hypoxic. Treatment stopped and peak pressures improved.     Review of Systems   Unable to perform ROS: Intubated     Objective:     Vital Signs (Most Recent):  Temp: (!) 95.6 °F (35.3 °C) (02/27/18 0700)  Pulse: 84 (02/27/18 0800)  Resp: 15 (02/27/18 0800)  BP: 105/61 (02/27/18 0800)  SpO2: 100 % (02/27/18 0800) Vital Signs (24h Range):  Temp:  [95.6 °F (35.3 °C)-98.1 °F (36.7 °C)] 95.6 °F (35.3 °C)  Pulse:  [] 84  Resp:  [15-25] 15  SpO2:  [92 %-100 %] 100 %  BP: ()/(51-72) 105/61  Arterial Line BP: ()/(47-73) 109/61   Weight: 50 kg (110 lb 3.7 oz) (reviewed)  Body mass index is 17.79 kg/m².      Intake/Output Summary (Last 24 hours) at 02/27/18 0837  Last data filed at 02/27/18 0800   Gross per 24 hour   Intake          4012.68 ml   Output             1610 ml   Net          2402.68 ml       Physical Exam   Constitutional: He is sedated and intubated.   HENT:   Head: Normocephalic and atraumatic.   Eyes: Right pupil is not reactive (constricted bilaterally). Left pupil is not reactive. Pupils are equal.   Cardiovascular: Normal rate, regular rhythm and normal pulses.    No murmur heard.  Warm extremities   Pulmonary/Chest: He is intubated. He has wheezes (Diffuse wheezing, inspitatory and expiratory, inspiratory improved. ).   No subcutaneous emphysema appreciated   Abdominal: Soft. He exhibits no distension and no mass. Tenderness:                                                                                                                                                                                Quiet bowel sounds   Genitourinary:   Genitourinary Comments: Urine catheter with clear, yellow drainage   Musculoskeletal: He exhibits no  edema or deformity.   Lymphadenopathy:     He has no cervical adenopathy.   Neurological:   Sedated on propofol due to severe acute hypercapnic and hypoxemic respiratory failure.  Constricted pupils with no appreciable reactivity. Flaccid paralysis. Diminished reflexes diffusely.    Skin: Skin is warm and dry.   Nursing note and vitals reviewed.      Vents:  Vent Mode: A/C (02/27/18 0759)  Ventilator Initiated: Yes (02/25/18 1751)  Set Rate: 18 bmp (02/27/18 0759)  Vt Set: 360 mL (02/27/18 0759)  Pressure Support: 0 cmH20 (02/27/18 0759)  PEEP/CPAP: 5 cmH20 (02/27/18 0759)  Oxygen Concentration (%): 40 (02/27/18 0800)  Peak Airway Pressure: 65 cmH2O (02/27/18 0759)  Plateau Pressure: 23 cmH20 (02/27/18 0759)  Total Ve: 3.93 mL (02/27/18 0759)  F/VT Ratio<105 (RSBI): (P) 114.13 (02/27/18 0759)  Lines/Drains/Airways     Central Venous Catheter Line                 Trialysis (Dialysis) Catheter 02/26/18 2243 right internal jugular less than 1 day          Drain                 NG/OG Tube 02/25/18 1657 [place] sump 18 Fr. Right mouth 1 day         Urethral Catheter 02/25/18 1809 Latex 16 Fr. 1 day          Airway                 Airway - Non-Surgical 02/25/18 1642 Endotracheal Tube 1 day          Arterial Line                 Arterial Line 02/25/18 1930 Left Radial 1 day          Peripheral Intravenous Line                 Peripheral IV - Single Lumen 02/25/18 1605 Right Forearm 1 day         Peripheral IV - Single Lumen 02/25/18 1606 Left Forearm 1 day         Peripheral IV - Single Lumen 02/25/18 2200 Left;Medial Forearm 1 day         Peripheral IV - Single Lumen 02/26/18 1524 Left Forearm less than 1 day              Significant Labs:    CBC/Anemia Profile:    Recent Labs  Lab 02/25/18  1610  02/25/18  1723 02/26/18  0310 02/27/18  0320   WBC 18.86*  --   --  9.44 14.24*   HGB 15.7  --   --  13.0* 11.3*   HCT 48.2  < > 43 39.3* 34.6*     --   --  212 197   MCV 95  --   --  92 92   RDW 12.4  --   --  12.6 13.0    < > = values in this interval not displayed.     Chemistries:    Recent Labs  Lab 02/25/18  1610  02/26/18  0310 02/26/18  0833 02/26/18  1554 02/27/18  0015 02/27/18  0320     < > 137 137 139 137  --    K 4.7  < > 4.9 4.6 4.9 4.6  --      < > 109 111* 110 105  --    CO2 17*  < > 15* 18* 21* 26  --    BUN 19  < > 16 15 13 13  --    CREATININE 1.2  < > 0.9 0.8 0.8 0.8  --    CALCIUM 9.3  < > 8.1* 7.9* 7.9* 8.1*  --    ALBUMIN 4.2  < > 3.4* 3.1* 3.1* 2.9* 3.0*   PROT 7.8  --  6.2  --   --   --  5.4*   BILITOT 0.3  --  0.2  --   --   --  0.1   ALKPHOS 76  --  58  --   --   --  44*   ALT 22  --  18  --   --   --  17   AST 32  --  31  --   --   --  20   MG 2.3  --  2.2  --   --   --  2.0   PHOS 7.6*  < > 3.0 3.0 3.9 2.3* 2.1*   < > = values in this interval not displayed.    Recent Lab Results       02/27/18  0748 02/27/18  0744 02/27/18  0508 02/27/18  0320 02/27/18  0109      Immature Granulocytes    0.4      Immature Grans (Abs)    0.06  Comment:  Mild elevation in immature granulocytes is non specific and   can be seen in a variety of conditions including stress response,   acute inflammation, trauma and pregnancy. Correlation with other   laboratory and clinical findings is essential.  (H)      Albumin    3.0(L)      Alkaline Phosphatase    44(L)      Allens Test N/A  N/A  N/A     ALT    17      Anion Gap          AST    20      Baso #    0.00      Basophil%    0.0      Bilirubin, Direct    0.1      Total Bilirubin    0.1  Comment:  For infants and newborns, interpretation of results should be based  on gestational age, weight and in agreement with clinical  observations.  Premature Infant recommended reference ranges:  Up to 24 hours.............<8.0 mg/dL  Up to 48 hours............<12.0 mg/dL  3-5 days..................<15.0 mg/dL  6-29 days.................<15.0 mg/dL        Site Sobeida/UAC  Sobeida/UAC  Sobeida/UAC     BUN, Bld          Calcium          Chloride          CO2          CPK           Creatinine          Diastolic Dysfunction          DelSys Adult Vent  Adult Vent  Adult Vent     Differential Method    Automated      EF          eGFR if           eGFR if non           Eos #    0.0      Eosinophil%    0.0      FiO2 40  40  30     Glucose          Gran # (ANC)    12.8(H)      Gran%    89.6(H)      Hematocrit    34.6(L)      Hemoglobin    11.3(L)      Lactate, Ziyad          Lymph #    0.6(L)      Lymph%    4.2(L)      Magnesium    2.0      MCH    30.1      MCHC    32.7      MCV    92      Min Vol   7  6.8     Mode AC/PRVC  AC/PRVC  AC/PRVC     Mono #    0.8      Mono%    5.8      MPV    10.2      nRBC    0      PEEP 5  5  5     Phosphorus    2.1(L)      PiP   53  53     Platelets    197      POC BE 6  3  0     POC HCO3 31.6(H)  29.5(H)  26.2     POC PCO2 56.5(HH)  57.6(HH)  49.9(H)     POC PH 7.357  7.317(L)  7.328(L)     POC PO2 88  91  70(L)     POC SATURATED O2 96  96  92(L)     POC TCO2 33(H)  31(H)  28(H)     POCT Glucose  160(H)        Potassium          Total Protein    5.4(L)      Rate 18  18  18     RBC    3.76(L)      RDW    13.0      Sample ARTERIAL  ARTERIAL  ARTERIAL     Sodium          Sp02 100  99  97     Vancomycin, Random    9.6      Vt 360  360  360     WBC    14.24(H)                  02/27/18  0015 02/26/18  2139 02/26/18  1808 02/26/18  1559 02/26/18  1554      Immature Granulocytes          Immature Grans (Abs)          Albumin 2.9(L)    3.1(L)     Alkaline Phosphatase          Allens Test  N/A  N/A      ALT          Anion Gap 6(L)    8     AST          Baso #          Basophil%          Bilirubin, Direct          Total Bilirubin          Site  Sobeida/UAC  Sobeida/UAC      BUN, Bld 13    13     Calcium 8.1(L)    7.9(L)     Chloride 105    110     CO2 26    21(L)     CPK     1041(H)     Creatinine 0.8    0.8     Diastolic Dysfunction          DelSys  Adult Vent  Adult Vent      Differential Method          EF          eGFR if  >60.0     >60.0     eGFR if non  >60.0  Comment:  Calculation used to obtain the estimated glomerular filtration  rate (eGFR) is the CKD-EPI equation.       >60.0  Comment:  Calculation used to obtain the estimated glomerular filtration  rate (eGFR) is the CKD-EPI equation.        Eos #          Eosinophil%          FiO2  30  40      Glucose 181(H)    133(H)     Gran # (ANC)          Gran%          Hematocrit          Hemoglobin          Lactate, Ziyad 2.8  Comment:  Falsely low lactic acid results can be found in samples   containing >=13.0 mg/dL total bilirubin and/or >=3.5 mg/dL   direct bilirubin.  (H)    3.7  Comment:  Falsely low lactic acid results can be found in samples   containing >=13.0 mg/dL total bilirubin and/or >=3.5 mg/dL   direct bilirubin.  *Critical value -   Results called to and read back by: Alma Jacob.  (HH)     Lymph #          Lymph%          Magnesium          MCH          MCHC          MCV          Min Vol  7  7.61      Mode  AC/PRVC  AC/PRVC      Mono #          Mono%          MPV          nRBC          PEEP  5  5      Phosphorus 2.3(L)    3.9     PiP  42  46      Platelets          POC BE  -1  -5      POC HCO3  26.1  23.1(L)      POC PCO2  59.3(HH)  59.6(HH)      POC PH  7.251(LL)  7.196(LL)      POC PO2  72(L)  166(H)      POC SATURATED O2  91(L)  99      POC TCO2  28(H)  25      POCT Glucose   130(H)       Potassium 4.6    4.9     Total Protein          Rate  20  16      RBC          RDW          Sample  ARTERIAL  ARTERIAL      Sodium 137    139     Sp02  97  100      Vancomycin, Random          Vt  330  400      WBC                      02/26/18  1227 02/26/18  1224 02/26/18  1124 02/26/18  1015 02/26/18  0955      Immature Granulocytes          Immature Grans (Abs)          Albumin          Alkaline Phosphatase          Allens Test   N/A  N/A     ALT          Anion Gap          AST          Baso #          Basophil%          Bilirubin, Direct          Total Bilirubin           Site   Sobeida/UAC  Sobeida/UAC     BUN, Bld          Calcium          Chloride          CO2          CPK          Creatinine          Diastolic Dysfunction    No      DelSys   Adult Vent  Adult Vent     Differential Method          EF    60      eGFR if           eGFR if non           Eos #          Eosinophil%          FiO2   40  50     Glucose          Gran # (ANC)          Gran%          Hematocrit          Hemoglobin          Lactate, Ziyad  3.7  Comment:  Falsely low lactic acid results can be found in samples   containing >=13.0 mg/dL total bilirubin and/or >=3.5 mg/dL   direct bilirubin.  *Critical value -   Results called to and read back by:eric payan rn  ()        Lymph #          Lymph%          Magnesium          MCH          MCHC          MCV          Min Vol   6.85  10.1     Mode   AC/PRVC  AC/PRVC     Mono #          Mono%          MPV          nRBC          PEEP   5  5     Phosphorus          PiP   42  53     Platelets          POC BE   -10  -8     POC HCO3   18.7(L)  20.1(L)     POC PCO2   52.6(H)  51.6(H)     POC PH   7.158(LL)  7.198(LL)     POC PO2   89  49(LL)     POC SATURATED O2   94(L)  74(L)     POC TCO2   20(L)  22(L)     POCT Glucose 131(H)         Potassium          Total Protein          Rate   16  24     RBC          RDW          Sample   ARTERIAL  ARTERIAL     Sodium          Sp02   98  100     Vancomycin, Random          Vt   400  380     WBC

## 2018-02-27 NOTE — PROGRESS NOTES
[facility]  Critical Care Medicine  Progress Note    Patient Name: Stanislaw Garcia  MRN: [id number]  Admission Date: 2/25/2018  Hospital Length of Stay: 2 days  Code Status: Full Code  Attending Provider: Bharath Everett MD  Primary Care Provider: Primary Doctor No   Principal Problem: Severe persistent asthma with status asthmaticus    Subjective:     HPI:  Mr. Garcia is a 28 y/o male with history of asthma since childhood who presented to the ED with severe asthma exacerbation with acute hypercapnic and hypoxemic respiratory failure. He reportedly ran out of his albuterol.  History obtained from brother, Luis A, and patient's mother.  Mr. Garcia only takes an albuterol inhaler for his asthma and was reportedly well controlled until December when he presented to the ED with asthma exacerbation.  He was treated in the ED and discharged home.  He reportedly improved following exacerbation; however, again worsened 1 month ago.  Patient's mother reports that he has not been feeling well for 3 days.  No reports of sick contacts. His brother found him at ~3 pm this afternoon in respiratory distress and unresponsive.  His brother reported that he was at his baseline complaining of difficulty breathing 1 hours prior to his brother finding him.   EMS called and reported Spo2 50% on arrival at which time they began providing assisted breaths with an ambu with subsequent improvement in Spo2 90's.  On arrival to ED, he was placed on BiPAP 10/15 with continuous nebs.  He received 125 mg solumedrol, 2 mg Magnesium.  His mental status worsened with somnolence and VBG showing pH 7.0, PaCO2 81.  He was subsequently intubated.      Mr. Garcia lives with his brother Luis A in [place].  He has an older brother who lives in [place], [place], and his mother lives in [place], [place]. He reported smokes cigarettes (1 ppd) with occasional alcohol use.  No reports of drug use. Mr. Garcia does not see a Pulmonologist  and family are not sure who prescribes his albuterol.          Interval History/Significant Events: Episodes of bronchospasm associated with albuterol inhalation overnight. Resolved with bagging. Further bronchoconstrictors stopped overnight.  Another episode this morning with budesonide treatment. Patient did not become hypoxic. Treatment stopped and peak pressures improved.     Review of Systems   Unable to perform ROS: Intubated     Objective:     Vital Signs (Most Recent):  Temp: (!) 95.6 °F (35.3 °C) (02/27/18 0700)  Pulse: 84 (02/27/18 0800)  Resp: 15 (02/27/18 0800)  BP: 105/61 (02/27/18 0800)  SpO2: 100 % (02/27/18 0800) Vital Signs (24h Range):  Temp:  [95.6 °F (35.3 °C)-98.1 °F (36.7 °C)] 95.6 °F (35.3 °C)  Pulse:  [] 84  Resp:  [15-25] 15  SpO2:  [92 %-100 %] 100 %  BP: ()/(51-72) 105/61  Arterial Line BP: ()/(47-73) 109/61   Weight: 50 kg (110 lb 3.7 oz) (reviewed)  Body mass index is 17.79 kg/m².      Intake/Output Summary (Last 24 hours) at 02/27/18 0837  Last data filed at 02/27/18 0800   Gross per 24 hour   Intake          4012.68 ml   Output             1610 ml   Net          2402.68 ml       Physical Exam   Constitutional: He is sedated and intubated.   HENT:   Head: Normocephalic and atraumatic.   Eyes: Right pupil is not reactive (constricted bilaterally). Left pupil is not reactive. Pupils are equal.   Cardiovascular: Normal rate, regular rhythm and normal pulses.    No murmur heard.  Warm extremities   Pulmonary/Chest: He is intubated. He has wheezes (Diffuse wheezing, inspitatory and expiratory, inspiratory improved. ).   No subcutaneous emphysema appreciated   Abdominal: Soft. He exhibits no distension and no mass. Tenderness:                                                                                                                                                                                Quiet bowel sounds   Genitourinary:   Genitourinary Comments: Urine catheter  with clear, yellow drainage   Musculoskeletal: He exhibits no edema or deformity.   Lymphadenopathy:     He has no cervical adenopathy.   Neurological:   Sedated on propofol due to severe acute hypercapnic and hypoxemic respiratory failure.  Constricted pupils with no appreciable reactivity. Flaccid paralysis. Diminished reflexes diffusely.    Skin: Skin is warm and dry.   Nursing note and vitals reviewed.      Vents:  Vent Mode: A/C (02/27/18 0759)  Ventilator Initiated: Yes (02/25/18 1751)  Set Rate: 18 bmp (02/27/18 0759)  Vt Set: 360 mL (02/27/18 0759)  Pressure Support: 0 cmH20 (02/27/18 0759)  PEEP/CPAP: 5 cmH20 (02/27/18 0759)  Oxygen Concentration (%): 40 (02/27/18 0800)  Peak Airway Pressure: 65 cmH2O (02/27/18 0759)  Plateau Pressure: 23 cmH20 (02/27/18 0759)  Total Ve: 3.93 mL (02/27/18 0759)  F/VT Ratio<105 (RSBI): (P) 114.13 (02/27/18 0759)  Lines/Drains/Airways     Central Venous Catheter Line                 Trialysis (Dialysis) Catheter 02/26/18 2243 right internal jugular less than 1 day          Drain                 NG/OG Tube 02/25/18 1657 [place] sump 18 Fr. Right mouth 1 day         Urethral Catheter 02/25/18 1809 Latex 16 Fr. 1 day          Airway                 Airway - Non-Surgical 02/25/18 1642 Endotracheal Tube 1 day          Arterial Line                 Arterial Line 02/25/18 1930 Left Radial 1 day          Peripheral Intravenous Line                 Peripheral IV - Single Lumen 02/25/18 1605 Right Forearm 1 day         Peripheral IV - Single Lumen 02/25/18 1606 Left Forearm 1 day         Peripheral IV - Single Lumen 02/25/18 2200 Left;Medial Forearm 1 day         Peripheral IV - Single Lumen 02/26/18 1524 Left Forearm less than 1 day              Significant Labs:    CBC/Anemia Profile:    Recent Labs  Lab 02/25/18  1610  02/25/18  1723 02/26/18  0310 02/27/18  0320   WBC 18.86*  --   --  9.44 14.24*   HGB 15.7  --   --  13.0* 11.3*   HCT 48.2  < > 43 39.3* 34.6*     --   --  212  197   MCV 95  --   --  92 92   RDW 12.4  --   --  12.6 13.0   < > = values in this interval not displayed.     Chemistries:    Recent Labs  Lab 02/25/18  1610  02/26/18  0310 02/26/18  0833 02/26/18  1554 02/27/18  0015 02/27/18  0320     < > 137 137 139 137  --    K 4.7  < > 4.9 4.6 4.9 4.6  --      < > 109 111* 110 105  --    CO2 17*  < > 15* 18* 21* 26  --    BUN 19  < > 16 15 13 13  --    CREATININE 1.2  < > 0.9 0.8 0.8 0.8  --    CALCIUM 9.3  < > 8.1* 7.9* 7.9* 8.1*  --    ALBUMIN 4.2  < > 3.4* 3.1* 3.1* 2.9* 3.0*   PROT 7.8  --  6.2  --   --   --  5.4*   BILITOT 0.3  --  0.2  --   --   --  0.1   ALKPHOS 76  --  58  --   --   --  44*   ALT 22  --  18  --   --   --  17   AST 32  --  31  --   --   --  20   MG 2.3  --  2.2  --   --   --  2.0   PHOS 7.6*  < > 3.0 3.0 3.9 2.3* 2.1*   < > = values in this interval not displayed.    Recent Lab Results       02/27/18  0748 02/27/18  0744 02/27/18  0508 02/27/18  0320 02/27/18  0109      Immature Granulocytes    0.4      Immature Grans (Abs)    0.06  Comment:  Mild elevation in immature granulocytes is non specific and   can be seen in a variety of conditions including stress response,   acute inflammation, trauma and pregnancy. Correlation with other   laboratory and clinical findings is essential.  (H)      Albumin    3.0(L)      Alkaline Phosphatase    44(L)      Allens Test N/A  N/A  N/A     ALT    17      Anion Gap          AST    20      Baso #    0.00      Basophil%    0.0      Bilirubin, Direct    0.1      Total Bilirubin    0.1  Comment:  For infants and newborns, interpretation of results should be based  on gestational age, weight and in agreement with clinical  observations.  Premature Infant recommended reference ranges:  Up to 24 hours.............<8.0 mg/dL  Up to 48 hours............<12.0 mg/dL  3-5 days..................<15.0 mg/dL  6-29 days.................<15.0 mg/dL        Site Sobeida/UAC  Sobeida/UAC  Sobeida/UAC     BUN, Bld           Calcium          Chloride          CO2          CPK          Creatinine          Diastolic Dysfunction          DelSys Adult Vent  Adult Vent  Adult Vent     Differential Method    Automated      EF          eGFR if           eGFR if non           Eos #    0.0      Eosinophil%    0.0      FiO2 40  40  30     Glucose          Gran # (ANC)    12.8(H)      Gran%    89.6(H)      Hematocrit    34.6(L)      Hemoglobin    11.3(L)      Lactate, Ziyad          Lymph #    0.6(L)      Lymph%    4.2(L)      Magnesium    2.0      MCH    30.1      MCHC    32.7      MCV    92      Min Vol   7  6.8     Mode AC/PRVC  AC/PRVC  AC/PRVC     Mono #    0.8      Mono%    5.8      MPV    10.2      nRBC    0      PEEP 5  5  5     Phosphorus    2.1(L)      PiP   53  53     Platelets    197      POC BE 6  3  0     POC HCO3 31.6(H)  29.5(H)  26.2     POC PCO2 56.5(HH)  57.6(HH)  49.9(H)     POC PH 7.357  7.317(L)  7.328(L)     POC PO2 88  91  70(L)     POC SATURATED O2 96  96  92(L)     POC TCO2 33(H)  31(H)  28(H)     POCT Glucose  160(H)        Potassium          Total Protein    5.4(L)      Rate 18  18  18     RBC    3.76(L)      RDW    13.0      Sample ARTERIAL  ARTERIAL  ARTERIAL     Sodium          Sp02 100  99  97     Vancomycin, Random    9.6      Vt 360  360  360     WBC    14.24(H)                  02/27/18  0015 02/26/18  2139 02/26/18  1808 02/26/18  1559 02/26/18  1554      Immature Granulocytes          Immature Grans (Abs)          Albumin 2.9(L)    3.1(L)     Alkaline Phosphatase          Allens Test  N/A  N/A      ALT          Anion Gap 6(L)    8     AST          Baso #          Basophil%          Bilirubin, Direct          Total Bilirubin          Site  Sobeida/UAC  Sobeida/UAC      BUN, Bld 13    13     Calcium 8.1(L)    7.9(L)     Chloride 105    110     CO2 26    21(L)     CPK     1041(H)     Creatinine 0.8    0.8     Diastolic Dysfunction          DelSys  Adult Vent  Adult Vent      Differential  Method          EF          eGFR if  >60.0    >60.0     eGFR if non  >60.0  Comment:  Calculation used to obtain the estimated glomerular filtration  rate (eGFR) is the CKD-EPI equation.       >60.0  Comment:  Calculation used to obtain the estimated glomerular filtration  rate (eGFR) is the CKD-EPI equation.        Eos #          Eosinophil%          FiO2  30  40      Glucose 181(H)    133(H)     Gran # (ANC)          Gran%          Hematocrit          Hemoglobin          Lactate, Ziyad 2.8  Comment:  Falsely low lactic acid results can be found in samples   containing >=13.0 mg/dL total bilirubin and/or >=3.5 mg/dL   direct bilirubin.  (H)    3.7  Comment:  Falsely low lactic acid results can be found in samples   containing >=13.0 mg/dL total bilirubin and/or >=3.5 mg/dL   direct bilirubin.  *Critical value -   Results called to and read back by: Alma Jacob.  (HH)     Lymph #          Lymph%          Magnesium          MCH          MCHC          MCV          Min Vol  7  7.61      Mode  AC/PRVC  AC/PRVC      Mono #          Mono%          MPV          nRBC          PEEP  5  5      Phosphorus 2.3(L)    3.9     PiP  42  46      Platelets          POC BE  -1  -5      POC HCO3  26.1  23.1(L)      POC PCO2  59.3(HH)  59.6(HH)      POC PH  7.251(LL)  7.196(LL)      POC PO2  72(L)  166(H)      POC SATURATED O2  91(L)  99      POC TCO2  28(H)  25      POCT Glucose   130(H)       Potassium 4.6    4.9     Total Protein          Rate  20  16      RBC          RDW          Sample  ARTERIAL  ARTERIAL      Sodium 137    139     Sp02  97  100      Vancomycin, Random          Vt  330  400      WBC                      02/26/18  1227 02/26/18  1224 02/26/18  1124 02/26/18  1015 02/26/18  0955      Immature Granulocytes          Immature Grans (Abs)          Albumin          Alkaline Phosphatase          Allens Test   N/A  N/A     ALT          Anion Gap          AST          Baso #           Basophil%          Bilirubin, Direct          Total Bilirubin          Site   Sobeida/UAC  Sobeida/UAC     BUN, Bld          Calcium          Chloride          CO2          CPK          Creatinine          Diastolic Dysfunction    No      DelSys   Adult Vent  Adult Vent     Differential Method          EF    60      eGFR if           eGFR if non           Eos #          Eosinophil%          FiO2   40  50     Glucose          Gran # (ANC)          Gran%          Hematocrit          Hemoglobin          Lactate, Ziyad  3.7  Comment:  Falsely low lactic acid results can be found in samples   containing >=13.0 mg/dL total bilirubin and/or >=3.5 mg/dL   direct bilirubin.  *Critical value -   Results called to and read back by:eric payan rn  ()        Lymph #          Lymph%          Magnesium          MCH          MCHC          MCV          Min Vol   6.85  10.1     Mode   AC/PRVC  AC/PRVC     Mono #          Mono%          MPV          nRBC          PEEP   5  5     Phosphorus          PiP   42  53     Platelets          POC BE   -10  -8     POC HCO3   18.7(L)  20.1(L)     POC PCO2   52.6(H)  51.6(H)     POC PH   7.158(LL)  7.198(LL)     POC PO2   89  49(LL)     POC SATURATED O2   94(L)  74(L)     POC TCO2   20(L)  22(L)     POCT Glucose 131(H)         Potassium          Total Protein          Rate   16  24     RBC          RDW          Sample   ARTERIAL  ARTERIAL     Sodium          Sp02   98  100     Vancomycin, Random          Vt   400  380     WBC                            Assessment/Plan:     Neuro   Encephalopathy, metabolic    Difficult to assess 2/2 sedation and paralytics. Still intubated with rising lactic acidosis although pH improving.   Unresponsive with brother at home but apparently combative in ambulance and awake.     On exam patient with constricted pupils and flaccid paralysis, again 2/2 medications  CTHead negative        Pulmonary   * Severe persistent asthma with  status asthmaticus    --reportedly ran out of his albuterol, was requiring large quantities without relief at home the few days prior to admission.   --received continuous nebulizer for 1 hour, Magnesium, and Methylprednisolone 125 mg IV  -Intubated in ED    Still wheezing and hypercapnic. Will minimize albuterol treatments to limit acidosis.   Continuing bicarb drip started yesterday to correct metabolic acidosis (good response this AM).   --Will continue scheduled Methylprednisolone 80 mg Q8  --leukocytosis 18,000 on admission, improved after fluids. Rising today in setting of steroid use. Cultures and viral panel negative to date  --Procal negative, less likely pneumonia but given severity of symptoms will continue treating with broad spectrum antibiotics. Repeating CXR today  --paralzyed for better ventilation, will continue given severity of bronchospasm        Acute respiratory failure with hypoxia and hypercapnia    --see below.  Intubated in ED.        Renal/   Lactic acidosis    Improved this morning since minimizing albuterol, suspect iatrogenic due to frequent bronchodilator therapy.   Tachycardia and hypotension now also improving.   Still on broad spectrum antibiotics.        Oncology   Anemia    Acute Hgb drop from 15.7 to 11.3  Patient has received 4 L NS in addition to >2L given with other medications  Suspect dilutional but acute blood loss and lysis also in differential. No blood loss witnessed, no BMs since admission. Believe blood would have been visualized. Hemolysis labs sent although suspicion lower.            Critical Care Daily Checklist:    A: Awake: RASS Goal/Actual Goal:    Actual: [place] Agitation Sedation Scale (RASS): Deep sedation   B: Spontaneous Breathing Trial Performed?     C: SAT & SBT Coordinated?  No                      D: Delirium: CAM-ICU     E: Early Mobility Performed? No   F: Feeding Goal: Goals: Patient to receive nutrition by RD follow-up  Status: Nutrition Goal  Status: new   Current Diet Order   Procedures    Diet NPO      AS: Analgesia/Sedation    T: Thromboembolic Prophylaxis Lovenox   H: HOB > 300 No   U: Stress Ulcer Prophylaxis (if needed) Pepcid   G: Glucose Control LDSSI   B: Bowel Function     I: Indwelling Catheter (Lines & Wood) Necessity Right IJ Central Line  Wood  Art Line   D: De-escalation of Antimicrobials/Pharmacotherapies Will consider today    Plan for the day/ETD Supportive care,monitor BP and respiratory status    Code Status:  Family/Goals of Care: Full Code  Mother at bedside, case discussed at length       Critical secondary to Patient has a condition that poses threat to life and bodily function: Severe Respiratory Distress      Critical care was time spent personally by me on the following activities: development of treatment plan with patient or surrogate and bedside caregivers, discussions with consultants, evaluation of patient's response to treatment, examination of patient, ordering and performing treatments and interventions, ordering and review of laboratory studies, ordering and review of radiographic studies, pulse oximetry, re-evaluation of patient's condition. This critical care time did not overlap with that of any other provider or involve time for any procedures.     Zaki Holloway MD   Internal Medicine PGY-1  [phone]

## 2018-02-27 NOTE — HOSPITAL COURSE
Patient intubated in the ED and admitted to the MICU service. Sedated and paralyzed for severe bronchoconstriction to prevent asynchrony.     Received frequent bronchodilators and had combined gap, non-gap, respiratory acidoses. Patient developed mild pneumomediastinum and subcutaneous emphysema (not palpable) on imaging. Rate and tidal volumes set and permissive hypercapnia established. Started on bicarb drip to improve metabolic acidosis. Bronchodilators reduced (salome albuterol) due to lactic acidosis which improved.     Overnight 2/26-2/27 patient had worsening bronchospasm while bronchodilators administered. Occurred again with inhaled steroids 2/27.     Wheezing and expiratory tracing markedly improved 2/28 and 3/1 and he was extubated on 3/1.

## 2018-02-27 NOTE — NURSING
CC team called to bedside for pt desatting quickly to 85% while on the vent. Pt had received scheduled breathing treatment prior to this episode. Pt's MAPs quickly decreased into the 50s requiring levo gtt initiation. Pt's sats increased to 100% after being bagged per RRT for about one minute. Levo weaned off shortly after. Will continue to monitor.

## 2018-02-27 NOTE — CONSULTS
[facility]  Adult Nutrition  Consult Note    RD received consult regarding tube feeding recommendations. RD completed full assessment yesterday, recommendations copied below.     Also, MD noted on consult that albumin is falling. Albumin and prealbumin should not be used as indicators of nutritional status. Both can be affected by inflammation and fluid status. Recommend obtaining CRP. Elevated CRP can cause albumin/prealbumin to be decreased.    Recommendations  1. When able to extubate, ADAT to Regular with texture per SLP.   2. If unable to extubate, recommend initiating TF of Isosource 1.5 at a goal rate of 45 mL/hr - to provide 1620 kcal/day, 73 g protein/day, and 825 mL free fluid/day.   RD to monitor and follow-up as previously scheduled.

## 2018-02-27 NOTE — NURSING
Pt O2 sats dec to low 90's, tachycardic with elevated bp. MD notified and reported to bedside. RT at bedside to bag pt, sats improving. Dr. Everett and Dr. Patel at bedside to make vent changes. STAT cxr obtained. Orders received. See flowsheets for full assessment.

## 2018-02-27 NOTE — ASSESSMENT & PLAN NOTE
Improved this morning since minimizing albuterol, suspect iatrogenic due to frequent bronchodilator therapy.   Tachycardia and hypotension now also improving.   Still on broad spectrum antibiotics.

## 2018-02-27 NOTE — PT/OT/SLP PROGRESS
Physical Therapy  Missed Visit    Patient Name:  Stanislaw Garcia   MRN:  [id number]  Admitting Diagnosis:  Severe persistent asthma with status asthmaticus   Recent Surgery: * No surgery found *      Patient not seen today secondary to Unavailable (Pt continues to be intubated, sedated and medically paralyzed. Pt not appropriate for PT services at this time. Please re-order therapy when medically appropriate).     Carissa Moore PT, DPT  2/27/2018  Pager: [phone]

## 2018-02-28 LAB
ALBUMIN SERPL BCP-MCNC: 2.8 G/DL
ALBUMIN SERPL BCP-MCNC: 2.9 G/DL
ALBUMIN SERPL BCP-MCNC: 2.9 G/DL
ALLENS TEST: ABNORMAL
ALP SERPL-CCNC: 42 U/L
ALT SERPL W/O P-5'-P-CCNC: 25 U/L
ANION GAP SERPL CALC-SCNC: 6 MMOL/L
ANION GAP SERPL CALC-SCNC: 8 MMOL/L
AST SERPL-CCNC: 31 U/L
BACTERIA SPEC AEROBE CULT: NORMAL
BASOPHILS # BLD AUTO: 0.01 K/UL
BASOPHILS NFR BLD: 0.1 %
BILIRUB DIRECT SERPL-MCNC: 0.1 MG/DL
BILIRUB SERPL-MCNC: 0.2 MG/DL
BUN SERPL-MCNC: 11 MG/DL
BUN SERPL-MCNC: 12 MG/DL
CALCIUM SERPL-MCNC: 8.3 MG/DL
CALCIUM SERPL-MCNC: 8.5 MG/DL
CHLORIDE SERPL-SCNC: 100 MMOL/L
CHLORIDE SERPL-SCNC: 100 MMOL/L
CO2 SERPL-SCNC: 31 MMOL/L
CO2 SERPL-SCNC: 33 MMOL/L
CREAT SERPL-MCNC: 0.8 MG/DL
CREAT SERPL-MCNC: 0.9 MG/DL
DELSYS: ABNORMAL
DIFFERENTIAL METHOD: ABNORMAL
EOSINOPHIL # BLD AUTO: 0 K/UL
EOSINOPHIL NFR BLD: 0 %
ERYTHROCYTE [DISTWIDTH] IN BLOOD BY AUTOMATED COUNT: 13 %
ERYTHROCYTE [SEDIMENTATION RATE] IN BLOOD BY WESTERGREN METHOD: 14 MM/H
ERYTHROCYTE [SEDIMENTATION RATE] IN BLOOD BY WESTERGREN METHOD: 14 MM/H
ERYTHROCYTE [SEDIMENTATION RATE] IN BLOOD BY WESTERGREN METHOD: 18 MM/H
EST. GFR  (AFRICAN AMERICAN): >60 ML/MIN/1.73 M^2
EST. GFR  (AFRICAN AMERICAN): >60 ML/MIN/1.73 M^2
EST. GFR  (NON AFRICAN AMERICAN): >60 ML/MIN/1.73 M^2
EST. GFR  (NON AFRICAN AMERICAN): >60 ML/MIN/1.73 M^2
FIO2: 40
GLUCOSE SERPL-MCNC: 127 MG/DL
GLUCOSE SERPL-MCNC: 146 MG/DL
GRAM STN SPEC: NORMAL
HCO3 UR-SCNC: 29.2 MMOL/L (ref 24–28)
HCO3 UR-SCNC: 32.9 MMOL/L (ref 24–28)
HCO3 UR-SCNC: 33.8 MMOL/L (ref 24–28)
HCO3 UR-SCNC: 35.3 MMOL/L (ref 24–28)
HCO3 UR-SCNC: 37.2 MMOL/L (ref 24–28)
HCO3 UR-SCNC: 38.3 MMOL/L (ref 24–28)
HCT VFR BLD AUTO: 34.8 %
HGB BLD-MCNC: 11.2 G/DL
IMM GRANULOCYTES # BLD AUTO: 0.05 K/UL
IMM GRANULOCYTES NFR BLD AUTO: 0.4 %
LACTATE SERPL-SCNC: 0.8 MMOL/L
LYMPHOCYTES # BLD AUTO: 0.6 K/UL
LYMPHOCYTES NFR BLD: 4.4 %
MAGNESIUM SERPL-MCNC: 2.2 MG/DL
MCH RBC QN AUTO: 30.2 PG
MCHC RBC AUTO-ENTMCNC: 32.2 G/DL
MCV RBC AUTO: 94 FL
MIN VOL: 6
MIN VOL: 6.5
MODE: ABNORMAL
MONOCYTES # BLD AUTO: 0.7 K/UL
MONOCYTES NFR BLD: 5 %
NEUTROPHILS # BLD AUTO: 12.8 K/UL
NEUTROPHILS NFR BLD: 90.1 %
NRBC BLD-RTO: 0 /100 WBC
PCO2 BLDA: 47.2 MMHG (ref 35–45)
PCO2 BLDA: 58.1 MMHG (ref 35–45)
PCO2 BLDA: 58.5 MMHG (ref 35–45)
PCO2 BLDA: 59.1 MMHG (ref 35–45)
PCO2 BLDA: 60.4 MMHG (ref 35–45)
PCO2 BLDA: 60.6 MMHG (ref 35–45)
PEEP: 10
PEEP: 8
PH SMN: 7.36 [PH] (ref 7.35–7.45)
PH SMN: 7.37 [PH] (ref 7.35–7.45)
PH SMN: 7.38 [PH] (ref 7.35–7.45)
PH SMN: 7.4 [PH] (ref 7.35–7.45)
PH SMN: 7.41 [PH] (ref 7.35–7.45)
PH SMN: 7.41 [PH] (ref 7.35–7.45)
PHOSPHATE SERPL-MCNC: 2.9 MG/DL
PHOSPHATE SERPL-MCNC: 3.2 MG/DL
PHOSPHATE SERPL-MCNC: 3.2 MG/DL
PIP: 31
PIP: 32
PLATELET # BLD AUTO: 198 K/UL
PMV BLD AUTO: 10.1 FL
PO2 BLDA: 104 MMHG (ref 80–100)
PO2 BLDA: 106 MMHG (ref 80–100)
PO2 BLDA: 113 MMHG (ref 80–100)
PO2 BLDA: 113 MMHG (ref 80–100)
PO2 BLDA: 94 MMHG (ref 80–100)
PO2 BLDA: 98 MMHG (ref 80–100)
POC BE: 10 MMOL/L
POC BE: 13 MMOL/L
POC BE: 14 MMOL/L
POC BE: 4 MMOL/L
POC BE: 7 MMOL/L
POC BE: 8 MMOL/L
POC SATURATED O2: 97 % (ref 95–100)
POC SATURATED O2: 98 % (ref 95–100)
POC TCO2: 31 MMOL/L (ref 23–27)
POC TCO2: 35 MMOL/L (ref 23–27)
POC TCO2: 36 MMOL/L (ref 23–27)
POC TCO2: 37 MMOL/L (ref 23–27)
POC TCO2: 39 MMOL/L (ref 23–27)
POC TCO2: 40 MMOL/L (ref 23–27)
POCT GLUCOSE: 101 MG/DL (ref 70–110)
POCT GLUCOSE: 107 MG/DL (ref 70–110)
POCT GLUCOSE: 117 MG/DL (ref 70–110)
POCT GLUCOSE: 118 MG/DL (ref 70–110)
POTASSIUM SERPL-SCNC: 4.7 MMOL/L
POTASSIUM SERPL-SCNC: 4.7 MMOL/L
PROT SERPL-MCNC: 5.6 G/DL
RBC # BLD AUTO: 3.71 M/UL
SAMPLE: ABNORMAL
SITE: ABNORMAL
SODIUM SERPL-SCNC: 139 MMOL/L
SODIUM SERPL-SCNC: 139 MMOL/L
SP02: 97
SP02: 97
SP02: 98
SP02: 99
THEOPHYLLINE SERPL-MCNC: 7.2 UG/ML
VT: 340
VT: 400
VT: 400
WBC # BLD AUTO: 14.13 K/UL

## 2018-02-28 PROCEDURE — 37799 UNLISTED PX VASCULAR SURGERY: CPT

## 2018-02-28 PROCEDURE — 63600175 PHARM REV CODE 636 W HCPCS: Performed by: NURSE PRACTITIONER

## 2018-02-28 PROCEDURE — 25000003 PHARM REV CODE 250: Performed by: NURSE PRACTITIONER

## 2018-02-28 PROCEDURE — 82247 BILIRUBIN TOTAL: CPT

## 2018-02-28 PROCEDURE — 25000003 PHARM REV CODE 250: Performed by: STUDENT IN AN ORGANIZED HEALTH CARE EDUCATION/TRAINING PROGRAM

## 2018-02-28 PROCEDURE — 93005 ELECTROCARDIOGRAM TRACING: CPT

## 2018-02-28 PROCEDURE — 99291 CRITICAL CARE FIRST HOUR: CPT | Mod: ,,, | Performed by: INTERNAL MEDICINE

## 2018-02-28 PROCEDURE — 82803 BLOOD GASES ANY COMBINATION: CPT

## 2018-02-28 PROCEDURE — 63600175 PHARM REV CODE 636 W HCPCS: Performed by: GENERAL ACUTE CARE HOSPITAL

## 2018-02-28 PROCEDURE — 94003 VENT MGMT INPAT SUBQ DAY: CPT

## 2018-02-28 PROCEDURE — 94761 N-INVAS EAR/PLS OXIMETRY MLT: CPT

## 2018-02-28 PROCEDURE — 84075 ASSAY ALKALINE PHOSPHATASE: CPT

## 2018-02-28 PROCEDURE — 99900035 HC TECH TIME PER 15 MIN (STAT)

## 2018-02-28 PROCEDURE — 85025 COMPLETE CBC W/AUTO DIFF WBC: CPT

## 2018-02-28 PROCEDURE — 20000000 HC ICU ROOM

## 2018-02-28 PROCEDURE — 93010 ELECTROCARDIOGRAM REPORT: CPT | Mod: ,,, | Performed by: INTERNAL MEDICINE

## 2018-02-28 PROCEDURE — 27200966 HC CLOSED SUCTION SYSTEM

## 2018-02-28 PROCEDURE — 36415 COLL VENOUS BLD VENIPUNCTURE: CPT

## 2018-02-28 PROCEDURE — A4216 STERILE WATER/SALINE, 10 ML: HCPCS | Performed by: NURSE PRACTITIONER

## 2018-02-28 PROCEDURE — 80198 ASSAY OF THEOPHYLLINE: CPT

## 2018-02-28 PROCEDURE — 83605 ASSAY OF LACTIC ACID: CPT

## 2018-02-28 PROCEDURE — 63600175 PHARM REV CODE 636 W HCPCS: Performed by: EMERGENCY MEDICINE

## 2018-02-28 PROCEDURE — 63600175 PHARM REV CODE 636 W HCPCS: Performed by: STUDENT IN AN ORGANIZED HEALTH CARE EDUCATION/TRAINING PROGRAM

## 2018-02-28 PROCEDURE — 94640 AIRWAY INHALATION TREATMENT: CPT

## 2018-02-28 PROCEDURE — 80069 RENAL FUNCTION PANEL: CPT

## 2018-02-28 PROCEDURE — 99900026 HC AIRWAY MAINTENANCE (STAT)

## 2018-02-28 PROCEDURE — 83735 ASSAY OF MAGNESIUM: CPT

## 2018-02-28 RX ORDER — POLYETHYLENE GLYCOL 3350 17 G/17G
17 POWDER, FOR SOLUTION ORAL DAILY
Status: DISCONTINUED | OUTPATIENT
Start: 2018-02-28 | End: 2018-03-01

## 2018-02-28 RX ORDER — AMOXICILLIN 250 MG
1 CAPSULE ORAL DAILY
Status: DISCONTINUED | OUTPATIENT
Start: 2018-02-28 | End: 2018-03-01

## 2018-02-28 RX ADMIN — Medication 3 ML: at 05:02

## 2018-02-28 RX ADMIN — METHYLPREDNISOLONE SODIUM SUCCINATE 125 MG: 125 INJECTION, POWDER, FOR SOLUTION INTRAMUSCULAR; INTRAVENOUS at 11:02

## 2018-02-28 RX ADMIN — PIPERACILLIN AND TAZOBACTAM 4.5 G: 4; .5 INJECTION, POWDER, LYOPHILIZED, FOR SOLUTION INTRAVENOUS; PARENTERAL at 03:02

## 2018-02-28 RX ADMIN — MINERAL OIL AND WHITE PETROLATUM: 150; 830 OINTMENT OPHTHALMIC at 01:02

## 2018-02-28 RX ADMIN — Medication 250 MCG/HR: at 04:02

## 2018-02-28 RX ADMIN — METHYLPREDNISOLONE SODIUM SUCCINATE 125 MG: 125 INJECTION, POWDER, FOR SOLUTION INTRAMUSCULAR; INTRAVENOUS at 05:02

## 2018-02-28 RX ADMIN — FAMOTIDINE 20 MG: 20 TABLET, FILM COATED ORAL at 08:02

## 2018-02-28 RX ADMIN — ALBUTEROL SULFATE 4 PUFF: 90 AEROSOL, METERED RESPIRATORY (INHALATION) at 01:02

## 2018-02-28 RX ADMIN — ALBUTEROL SULFATE 4 PUFF: 90 AEROSOL, METERED RESPIRATORY (INHALATION) at 02:02

## 2018-02-28 RX ADMIN — ENOXAPARIN SODIUM 40 MG: 100 INJECTION SUBCUTANEOUS at 05:02

## 2018-02-28 RX ADMIN — DOCUSATE SODIUM 100 MG: 50 LIQUID ORAL at 08:02

## 2018-02-28 RX ADMIN — PIPERACILLIN AND TAZOBACTAM 4.5 G: 4; .5 INJECTION, POWDER, LYOPHILIZED, FOR SOLUTION INTRAVENOUS; PARENTERAL at 07:02

## 2018-02-28 RX ADMIN — FAMOTIDINE 20 MG: 20 TABLET, FILM COATED ORAL at 09:02

## 2018-02-28 RX ADMIN — ALBUTEROL SULFATE 4 PUFF: 90 AEROSOL, METERED RESPIRATORY (INHALATION) at 10:02

## 2018-02-28 RX ADMIN — ALBUTEROL SULFATE 4 PUFF: 90 AEROSOL, METERED RESPIRATORY (INHALATION) at 07:02

## 2018-02-28 RX ADMIN — VANCOMYCIN HYDROCHLORIDE 1250 MG: 1 INJECTION, POWDER, LYOPHILIZED, FOR SOLUTION INTRAVENOUS at 05:02

## 2018-02-28 RX ADMIN — CHLORHEXIDINE GLUCONATE 15 ML: 1.2 RINSE ORAL at 08:02

## 2018-02-28 RX ADMIN — AZITHROMYCIN 500 MG: 500 INJECTION, POWDER, LYOPHILIZED, FOR SOLUTION INTRAVENOUS at 11:02

## 2018-02-28 RX ADMIN — PROPOFOL 50 MCG/KG/MIN: 10 INJECTION, EMULSION INTRAVENOUS at 03:02

## 2018-02-28 RX ADMIN — PROPOFOL 50 MCG/KG/MIN: 10 INJECTION, EMULSION INTRAVENOUS at 07:02

## 2018-02-28 RX ADMIN — POLYETHYLENE GLYCOL 3350 17 G: 17 POWDER, FOR SOLUTION ORAL at 01:02

## 2018-02-28 RX ADMIN — STANDARDIZED SENNA CONCENTRATE AND DOCUSATE SODIUM 1 TABLET: 8.6; 5 TABLET, FILM COATED ORAL at 01:02

## 2018-02-28 RX ADMIN — SODIUM CHLORIDE 20 ML/HR: 9 INJECTION, SOLUTION INTRAVENOUS at 07:02

## 2018-02-28 RX ADMIN — CHLORHEXIDINE GLUCONATE 15 ML: 1.2 RINSE ORAL at 09:02

## 2018-02-28 RX ADMIN — Medication 3 ML: at 01:02

## 2018-02-28 RX ADMIN — Medication 3 ML: at 10:02

## 2018-02-28 RX ADMIN — PROPOFOL 40 MCG/KG/MIN: 10 INJECTION, EMULSION INTRAVENOUS at 12:02

## 2018-02-28 RX ADMIN — Medication 225 MCG/HR: at 04:02

## 2018-02-28 RX ADMIN — ALBUTEROL SULFATE 4 PUFF: 90 AEROSOL, METERED RESPIRATORY (INHALATION) at 06:02

## 2018-02-28 RX ADMIN — MINERAL OIL AND WHITE PETROLATUM: 150; 830 OINTMENT OPHTHALMIC at 05:02

## 2018-02-28 RX ADMIN — CISATRACURIUM BESYLATE 6 MCG/KG/MIN: 10 INJECTION INTRAVENOUS at 06:02

## 2018-02-28 RX ADMIN — PIPERACILLIN AND TAZOBACTAM 4.5 G: 4; .5 INJECTION, POWDER, LYOPHILIZED, FOR SOLUTION INTRAVENOUS; PARENTERAL at 10:02

## 2018-02-28 NOTE — NURSING
Propofol weaned to 25 mcg/kg/min, pt began to wake up. RR 23, HR 90s, BP 130s/80s. Pt neuro intact, see flowsheets for assessment. Propofol titrated up according to pt condition. MD notified; no acute distress noted at this time, will continue to monitor.

## 2018-02-28 NOTE — NURSING
Pt VSS throughout am shift with no bronchospastic episodes. Breath sounds and O2 sats improved, vent changes and Nimbex d/c per Dr. Everett's orders. Pt sinus riley 50-60s, MD notified. Will continue to monitor.

## 2018-02-28 NOTE — ASSESSMENT & PLAN NOTE
Difficult to assess 2/2 sedation and paralytics. Still intubated with rising lactic acidosis although pH improving.   Unresponsive with brother at home but apparently combative in ambulance and awake.     On exam patient with constricted pupils and flaccid paralysis, again 2/2 medications  CTHead negative    Will further assess after cessation of sedation.

## 2018-02-28 NOTE — PHYSICIAN QUERY
PT Name: Stanislaw Garcia  MR #: [id number]     Physician Query Form - Diagnosis Clarification      CDS/: Georgia Mishra               Contact information:[email or web address]    This form is a permanent document in the medical record.     Query Date: February 28, 2018    By submitting this query, we are merely seeking further clarification of documentation.  Please utilize your independent clinical judgment when addressing the question(s) below.     The medical record contains the following:      Findings Supporting Clinical Information Location in Medical Record   Lactic acidosis   Highest level 5.1 overnight  Improved to 3.5 this morning  Given 1L NS this AM and treating with broad spectrum abx for presumed sepsis and pneumonia although no definite radiographic features to suggest this at this time.   Patient has been hypotensive with MAPs in the high 60s but is not on pressors at this time. Tachycardic.   Suspect 2/2 albuterol, minimizing  Giving bicarb for metabolic acidosis.                                                    Temp = 97.9  HR = 100  RR = 24 on vent  BP = 91/55  O2 Sat = 94%      Azithromycin IV  Ceftriaxone IV  Zosyn IV  Vancomycin IV  3500mL NS bolus IV      WBC = 18.86--->9.44  Lactate = 3.8--->5.1  Procalcitonin = 0.07  Blood cx = NGTD CCM PN 2/26                                              V/S Flowsheet 2/26              MAR 2/25-current  MAR 2/25  MAR 2/26-current  MAR 2/26-current  MAR 2/25-2/26      Labs 2/25-2/26     Please clarify if the __Sepsis___ diagnosis has been:    [  ] Ruled In  [  ] Ruled In, Now Resolved  [ x ] Ruled Out  [  ] Clinically insignificant  [  ] Clinically undetermined  [  ] Other/Clarification of findings (please specify)_______________________________    Please document in your progress notes daily for the duration of treatment, until resolved, and include in your discharge summary.

## 2018-02-28 NOTE — ASSESSMENT & PLAN NOTE
Acute Hgb drop from 15.7 to 11.3. Stable at 11.2 today  Patient has been heavily fluid resuscitated, could represent dilutional component but was net negative 3 L yesterday without increase in Hgb.   Acute blood loss and lysis also in differential, but not worsening, will continue to monitor. No blood loss witnessed, no BMs since admission. Believe blood would have been visualized. Hemolysis labs negative.

## 2018-02-28 NOTE — PROGRESS NOTES
[facility]  Critical Care Medicine  Progress Note    Patient Name: Stanislaw Garcia  MRN: [id number]  Admission Date: 2/25/2018  Hospital Length of Stay: 3 days  Code Status: Full Code  Attending Provider: Bharath Everett MD  Primary Care Provider: Primary Doctor No   Principal Problem: Severe persistent asthma with status asthmaticus    Subjective:     HPI:  Mr. Garcia is a 28 y/o male with history of asthma since childhood who presented to the ED with severe asthma exacerbation with acute hypercapnic and hypoxemic respiratory failure. He reportedly ran out of his albuterol.  History obtained from brother, Luis A, and patient's mother.  Mr. Garcia only takes an albuterol inhaler for his asthma and was reportedly well controlled until December when he presented to the ED with asthma exacerbation.  He was treated in the ED and discharged home.  He reportedly improved following exacerbation; however, again worsened 1 month ago.  Patient's mother reports that he has not been feeling well for 3 days.  No reports of sick contacts. His brother found him at ~3 pm this afternoon in respiratory distress and unresponsive.  His brother reported that he was at his baseline complaining of difficulty breathing 1 hours prior to his brother finding him.   EMS called and reported Spo2 50% on arrival at which time they began providing assisted breaths with an ambu with subsequent improvement in Spo2 90's.  On arrival to ED, he was placed on BiPAP 10/15 with continuous nebs.  He received 125 mg solumedrol, 2 mg Magnesium.  His mental status worsened with somnolence and VBG showing pH 7.0, PaCO2 81.  He was subsequently intubated.      Mr. Garcia lives with his brother Luis A in [place].  He has an older brother who lives in [place], [place], and his mother lives in [place], [place]. He reported smokes cigarettes (1 ppd) with occasional alcohol use.  No reports of drug use. Mr. Garcia does not see a Pulmonologist  and family are not sure who prescribes his albuterol.          Hospital/ICU Course:  Patient intubated in the ED and admitted to the MICU service. Sedated and paralyzed for severe bronchoconstriction to prevent asynchrony.     Received frequent bronchodilators and had combined gap, non-gap, respiratory acidoses. Patient developed mild pneumomediastinum and subcutaneous emphysema (not palpable) on imaging. Rate and tidal volumes set and permissive hypercapnia established. Started on bicarb drip to improve metabolic acidosis. Bronchodilators reduced (salome albuterol) due to lactic acidosis which improved.     Overnight 2/26-2/27 patient had worsening bronchospasm while bronchodilators administered. Occurred again with inhaled steroids 2/27.     Interval History/Significant Events: Episode of bronchospasm yesterday evening unprovoked. Became hypoxic and required bagging. Given ativan, benadryl, theophylline. Bicarb drip restarted but stopped overnight due to nl pH and rising bicarb on chemistry panel    Review of Systems   Unable to perform ROS: Intubated     Objective:     Vital Signs (Most Recent):  Temp: 98.6 °F (37 °C) (02/28/18 0700)  Pulse: (!) 58 (02/28/18 0800)  Resp: 18 (02/28/18 0800)  BP: 107/64 (02/28/18 0800)  SpO2: 98 % (02/28/18 0800) Vital Signs (24h Range):  Temp:  [97.2 °F (36.2 °C)-99.1 °F (37.3 °C)] 98.6 °F (37 °C)  Pulse:  [] 58  Resp:  [] 18  SpO2:  [93 %-99 %] 98 %  BP: (100-123)/(55-69) 107/64  Arterial Line BP: ()/(52-88) 112/69   Weight: 57.2 kg (126 lb 1.7 oz)  Body mass index is 20.35 kg/m².      Intake/Output Summary (Last 24 hours) at 02/28/18 0821  Last data filed at 02/28/18 0800   Gross per 24 hour   Intake             2358 ml   Output             5555 ml   Net            -3197 ml       Physical Exam   Constitutional: He is sedated and intubated.   HENT:   Head: Normocephalic and atraumatic.   Eyes: Right pupil is not reactive (constricted bilaterally). Left pupil is not  reactive. Pupils are equal.   Cardiovascular: Normal rate, regular rhythm and normal pulses.    No murmur heard.  Warm extremities   Pulmonary/Chest: He is intubated. He has no wheezes (No inspiratory or expiratory wheezes this morning).   No subcutaneous emphysema appreciated   Abdominal: Soft. He exhibits no distension and no mass. Tenderness:                                                                                                                                                                                Quiet bowel sounds   Genitourinary:   Genitourinary Comments: Urine catheter with clear, yellow drainage   Musculoskeletal: He exhibits no edema or deformity.   Lymphadenopathy:     He has no cervical adenopathy.   Neurological:   Sedated on propofol due to severe acute hypercapnic and hypoxemic respiratory failure.  Constricted pupils with no appreciable reactivity. Flaccid paralysis. Diminished reflexes diffusely.    Skin: Skin is warm and dry.   Nursing note and vitals reviewed.      Vents:  Vent Mode: A/C (02/28/18 0712)  Ventilator Initiated: Yes (02/25/18 1751)  Set Rate: 18 bmp (02/28/18 0712)  Vt Set: 340 mL (02/28/18 0712)  Pressure Support: 0 cmH20 (02/28/18 0712)  PEEP/CPAP: 8 cmH20 (02/28/18 0712)  Oxygen Concentration (%): 40 (02/28/18 0800)  Peak Airway Pressure: 35 cmH2O (02/28/18 0712)  Plateau Pressure: 23 cmH20 (02/28/18 0712)  Total Ve: 6.53 mL (02/28/18 0712)  F/VT Ratio<105 (RSBI): (!) 49.59 (02/28/18 0712)  Lines/Drains/Airways     Central Venous Catheter Line                 Trialysis (Dialysis) Catheter 02/26/18 2243 right internal jugular 1 day          Drain                 NG/OG Tube 02/25/18 1657 [place] sump 18 Fr. Right mouth 2 days         Urethral Catheter 02/25/18 1809 Latex 16 Fr. 2 days          Airway                 Airway - Non-Surgical 02/25/18 1642 Endotracheal Tube 2 days          Arterial Line                 Arterial Line 02/25/18 1930 Left Radial 2 days           Peripheral Intravenous Line                 Peripheral IV - Single Lumen 02/25/18 1605 Right Forearm 2 days         Peripheral IV - Single Lumen 02/25/18 1606 Left Forearm 2 days         Peripheral IV - Single Lumen 02/25/18 2200 Left;Medial Forearm 2 days         Peripheral IV - Single Lumen 02/26/18 1524 Left Forearm 1 day              Significant Labs:    CBC/Anemia Profile:    Recent Labs  Lab 02/27/18  0320 02/27/18  0813 02/28/18  0333   WBC 14.24*  --  14.13*   HGB 11.3*  --  11.2*   HCT 34.6*  --  34.8*     --  198   MCV 92  --  94   RDW 13.0  --  13.0   RETIC  --  1.1  --         Chemistries:    Recent Labs  Lab 02/27/18  0320 02/27/18  0748 02/27/18  1554 02/27/18  2300 02/28/18  0333   NA  --  139 140 139 139   K  --  4.5 4.9 4.7 4.7   CL  --  104 108 100 100   CO2  --  29 28 33* 31*   BUN  --  10 10 12 11   CREATININE  --  0.7 0.7 0.9 0.8   CALCIUM  --  8.0* 7.5* 8.3* 8.5*   ALBUMIN 3.0* 2.9* 2.6* 2.8* 2.9*  2.9*   PROT 5.4*  --   --   --  5.6*   BILITOT 0.1  --   --   --  0.2   ALKPHOS 44*  --   --   --  42*   ALT 17  --   --   --  25   AST 20  --   --   --  31   MG 2.0 2.0  --   --  2.2   PHOS 2.1* 2.5* 3.1 2.9 3.2  3.2       Recent Lab Results       02/28/18  0812 02/28/18  0343 02/28/18  0340 02/28/18  0333 02/27/18  2346      Immature Granulocytes    0.4      Immature Grans (Abs)    0.05  Comment:  Mild elevation in immature granulocytes is non specific and   can be seen in a variety of conditions including stress response,   acute inflammation, trauma and pregnancy. Correlation with other   laboratory and clinical findings is essential.  (H)      Albumin    2.9(L)          2.9(L)      Alkaline Phosphatase    42(L)      Allens Test  N/A   N/A     ALT    25      Anion Gap    8      AST    31      Baso #    0.01      Basophil%    0.1      Bilirubin, Direct    0.1      Total Bilirubin    0.2  Comment:  For infants and newborns, interpretation of results should be based  on gestational age,  weight and in agreement with clinical  observations.  Premature Infant recommended reference ranges:  Up to 24 hours.............<8.0 mg/dL  Up to 48 hours............<12.0 mg/dL  3-5 days..................<15.0 mg/dL  6-29 days.................<15.0 mg/dL        Site  [place]   [place]/[facility]     BUN, Bld    11      Calcium    8.5(L)      Chloride    100      CO2    31(H)      Creatinine    0.8      DelFuriouss  Adult Vent   Adult Vent     Differential Method    Automated      eGFR if     >60.0      eGFR if non     >60.0  Comment:  Calculation used to obtain the estimated glomerular filtration  rate (eGFR) is the CKD-EPI equation.         Eos #    0.0      Eosinophil%    0.0      FiO2  40   40     Glucose    127(H)      Gran # (ANC)    12.8(H)      Gran%    90.1(H)      Hematocrit    34.8(L)      Hemoglobin    11.2(L)      Lactate, Ziyad    0.8  Comment:  Falsely low lactic acid results can be found in samples   containing >=13.0 mg/dL total bilirubin and/or >=3.5 mg/dL   direct bilirubin.        Lymph #    0.6(L)      Lymph%    4.4(L)      Magnesium    2.2      MCH    30.2      MCHC    32.2      MCV    94      Mode  AC/PRVC   AC/PRVC     Mono #    0.7      Mono%    5.0      MPV    10.1      nRBC    0      PEEP  8   10     Phosphorus    3.2          3.2      Platelets    198      POC BE  14   13     POC HCO3  38.3(H)   37.2(H)     POC PCO2  60.6(HH)   58.5(HH)     POC PH  7.409   7.412     POC PO2  106(H)   113(H)     POC SATURATED O2  98   98     POC TCO2  40(H)   39(H)     POCT Glucose 107  101       Potassium    4.7      Total Protein    5.6(L)      Rate  18   18     RBC    3.71(L)      RDW    13.0      Sample  ARTERIAL   ARTERIAL     Sodium    139      Sp02          Vancomycin-Trough          Vt  340   340     WBC    14.13(H)                  02/27/18  2309 02/27/18  2300 02/27/18 1923 02/27/18 1813 02/27/18 1813      Immature Granulocytes          Immature Grans (Abs)          Albumin   2.8(L)        Alkaline Phosphatase          Allens Test   N/A N/A      ALT          Anion Gap  6(L)        AST          Baso #          Basophil%          Bilirubin, Direct          Total Bilirubin          Site   [place] [place]      BUN, Bld  12        Calcium  8.3(L)        Chloride  100        CO2  33(H)        Creatinine  0.9        DelSys   Adult Vent Adult Vent      Differential Method          eGFR if   >60.0        eGFR if non   >60.0  Comment:  Calculation used to obtain the estimated glomerular filtration  rate (eGFR) is the CKD-EPI equation.           Eos #          Eosinophil%          FiO2   50 50      Glucose  146(H)        Gran # (ANC)          Gran%          Hematocrit          Hemoglobin          Lactate, Ziyad          Lymph #          Lymph%          Magnesium          MCH          MCHC          MCV          Mode   AC/PRVC AC/PRVC      Mono #          Mono%          MPV          nRBC          PEEP   10 12      Phosphorus  2.9        Platelets          POC BE   14 4      POC HCO3   39.8(H) 32.5(H)      POC PCO2   75.7(HH) 93.9(HH)      POC PH   7.328(L) 7.147(LL)      POC PO2   116(H) 107(H)      POC SATURATED O2   98 96      POC TCO2   42(H) 35(H)      POCT Glucose 131(H)    150(H)     Potassium  4.7        Total Protein          Rate   18 14      RBC          RDW          Sample   ARTERIAL ARTERIAL      Sodium  139        Sp02    95      Vancomycin-Trough          Vt   340 300      WBC                      02/27/18  1608 02/27/18  1554 02/27/18  1140 02/27/18  1133      Immature Granulocytes         Immature Grans (Abs)         Albumin  2.6(L)       Alkaline Phosphatase         Allens Test N/A        ALT         Anion Gap  4(L)       AST         Baso #         Basophil%         Bilirubin, Direct         Total Bilirubin         Site [place]/[facility]        BUN, Bld  10       Calcium  7.5(L)       Chloride  108       CO2  28       Creatinine  0.7       DelSys Adult Vent         Differential Method         eGFR if   >60.0       eGFR if non   >60.0  Comment:  Calculation used to obtain the estimated glomerular filtration  rate (eGFR) is the CKD-EPI equation.          Eos #         Eosinophil%         FiO2 40        Glucose  108       Gran # (ANC)         Gran%         Hematocrit         Hemoglobin         Lactate, Ziyad         Lymph #         Lymph%         Magnesium         MCH         MCHC         MCV         Mode AC/PRVC        Mono #         Mono%         MPV         nRBC         PEEP 5        Phosphorus  3.1       Platelets         POC BE 2        POC HCO3 28.4(H)        POC PCO2 60.0(HH)        POC PH 7.283(LL)        POC PO2 87        POC SATURATED O2 95        POC TCO2 30(H)        POCT Glucose   126(H)      Potassium  4.9       Total Protein         Rate 16        RBC         RDW         Sample ARTERIAL        Sodium  140       Sp02 96        Vancomycin-Trough    3.6(L)     Vt 360        WBC                 Assessment/Plan:     Neuro   Encephalopathy, metabolic    Difficult to assess 2/2 sedation and paralytics. Still intubated with rising lactic acidosis although pH improving.   Unresponsive with brother at home but apparently combative in ambulance and awake.     On exam patient with constricted pupils and flaccid paralysis, again 2/2 medications  CTHead negative    Will further assess after cessation of sedation.         Pulmonary   * Severe persistent asthma with status asthmaticus    --reportedly ran out of his albuterol, was requiring large quantities of nebulized treatment without relief at home the few days prior to admission.   --received continuous nebulizer for 1 hour, Magnesium, and Methylprednisolone 125 mg IV  -Intubated in ED  Active tobacco and marijuana smoker    Wheezing markedly improved this morning with improvement in expiratory waveform on ventilator.      -Continue paralysis and mechanical ventilation  -Albuterol and  ipratropium via MDI Q4  -theophylline loaded and started on drip  -Methylpred 125 Q6  -Continue broad spectrum abx for concern over respiratory infection  -Daily CXR        Acute respiratory failure with hypoxia and hypercapnia    --see below.  Intubated in ED.        Renal/   Lactic acidosis    Resolved this morning since reducing amount of albuterol, suspect iatrogenic due to frequent bronchodilator therapy.   Tachycardia and hypotension now resolved  Still on broad spectrum antibiotics.        Oncology   Anemia    Acute Hgb drop from 15.7 to 11.3. Stable at 11.2 today  Patient has been heavily fluid resuscitated, could represent dilutional component but was net negative 3 L yesterday without increase in Hgb.   Acute blood loss and lysis also in differential, but not worsening, will continue to monitor. No blood loss witnessed, no BMs since admission. Believe blood would have been visualized. Hemolysis labs negative.            Critical Care Daily Checklist:    A: Awake: RASS Goal/Actual Goal:    Actual: Moran Agitation Sedation Scale (RASS): Deep sedation   B: Spontaneous Breathing Trial Performed?     C: SAT & SBT Coordinated?  No                      D: Delirium: CAM-ICU     E: Early Mobility Performed? No   F: Feeding Goal: Goals: Patient to receive nutrition by RD follow-up  Status: Nutrition Goal Status: new   Current Diet Order   Procedures    Diet NPO      AS: Analgesia/Sedation Fentanyl, propofol   T: Thromboembolic Prophylaxis Lovenox   H: HOB > 300 No   U: Stress Ulcer Prophylaxis (if needed) Famotidine   G: Glucose Control At goal   B: Bowel Function  Started on bowel regimen, Abdo xray   I: Indwelling Catheter (Lines & Wood) Necessity Central line right IJ 2/26  Wood 2/25  Art line 2/25   D: De-escalation of Antimicrobials/Pharmacotherapies Continue broad spectrum    Plan for the day/ETD Supportive care    Code Status:  Family/Goals of Care: Full Code  Family at bedside, goal to restore normal  function       Critical secondary to Patient has a condition that poses threat to life and bodily function: Severe Respiratory Distress      Critical care was time spent personally by me on the following activities: development of treatment plan with patient or surrogate and bedside caregivers, discussions with consultants, evaluation of patient's response to treatment, examination of patient, ordering and performing treatments and interventions, ordering and review of laboratory studies, ordering and review of radiographic studies, pulse oximetry, re-evaluation of patient's condition. This critical care time did not overlap with that of any other provider or involve time for any procedures.    Zaki Holloway MD   Internal Medicine PGY-1  [phone]

## 2018-02-28 NOTE — NURSING
Pt's HR dec to 45-50 bpm sinus rhythm, MD notified. Orders received to wean propofol. Pt tolerating well. Will continue to monitor.

## 2018-02-28 NOTE — SUBJECTIVE & OBJECTIVE
Interval History/Significant Events: Episode of bronchospasm yesterday evening unprovoked. Became hypoxic and required bagging. Given ativan, benadryl, theophylline. Bicarb drip restarted but stopped overnight due to nl pH and rising bicarb on chemistry panel    Review of Systems   Unable to perform ROS: Intubated     Objective:     Vital Signs (Most Recent):  Temp: 98.6 °F (37 °C) (02/28/18 0700)  Pulse: (!) 58 (02/28/18 0800)  Resp: 18 (02/28/18 0800)  BP: 107/64 (02/28/18 0800)  SpO2: 98 % (02/28/18 0800) Vital Signs (24h Range):  Temp:  [97.2 °F (36.2 °C)-99.1 °F (37.3 °C)] 98.6 °F (37 °C)  Pulse:  [] 58  Resp:  [] 18  SpO2:  [93 %-99 %] 98 %  BP: (100-123)/(55-69) 107/64  Arterial Line BP: ()/(52-88) 112/69   Weight: 57.2 kg (126 lb 1.7 oz)  Body mass index is 20.35 kg/m².      Intake/Output Summary (Last 24 hours) at 02/28/18 0821  Last data filed at 02/28/18 0800   Gross per 24 hour   Intake             2358 ml   Output             5555 ml   Net            -3197 ml       Physical Exam   Constitutional: He is sedated and intubated.   HENT:   Head: Normocephalic and atraumatic.   Eyes: Right pupil is not reactive (constricted bilaterally). Left pupil is not reactive. Pupils are equal.   Cardiovascular: Normal rate, regular rhythm and normal pulses.    No murmur heard.  Warm extremities   Pulmonary/Chest: He is intubated. He has no wheezes (No inspiratory or expiratory wheezes this morning).   No subcutaneous emphysema appreciated   Abdominal: Soft. He exhibits no distension and no mass. Tenderness:                                                                                                                                                                                Quiet bowel sounds   Genitourinary:   Genitourinary Comments: Urine catheter with clear, yellow drainage   Musculoskeletal: He exhibits no edema or deformity.   Lymphadenopathy:     He has no cervical adenopathy.    Neurological:   Sedated on propofol due to severe acute hypercapnic and hypoxemic respiratory failure.  Constricted pupils with no appreciable reactivity. Flaccid paralysis. Diminished reflexes diffusely.    Skin: Skin is warm and dry.   Nursing note and vitals reviewed.      Vents:  Vent Mode: A/C (02/28/18 0712)  Ventilator Initiated: Yes (02/25/18 1751)  Set Rate: 18 bmp (02/28/18 0712)  Vt Set: 340 mL (02/28/18 0712)  Pressure Support: 0 cmH20 (02/28/18 0712)  PEEP/CPAP: 8 cmH20 (02/28/18 0712)  Oxygen Concentration (%): 40 (02/28/18 0800)  Peak Airway Pressure: 35 cmH2O (02/28/18 0712)  Plateau Pressure: 23 cmH20 (02/28/18 0712)  Total Ve: 6.53 mL (02/28/18 0712)  F/VT Ratio<105 (RSBI): (!) 49.59 (02/28/18 0712)  Lines/Drains/Airways     Central Venous Catheter Line                 Trialysis (Dialysis) Catheter 02/26/18 2243 right internal jugular 1 day          Drain                 NG/OG Tube 02/25/18 1657 Pepin sump 18 Fr. Right mouth 2 days         Urethral Catheter 02/25/18 1809 Latex 16 Fr. 2 days          Airway                 Airway - Non-Surgical 02/25/18 1642 Endotracheal Tube 2 days          Arterial Line                 Arterial Line 02/25/18 1930 Left Radial 2 days          Peripheral Intravenous Line                 Peripheral IV - Single Lumen 02/25/18 1605 Right Forearm 2 days         Peripheral IV - Single Lumen 02/25/18 1606 Left Forearm 2 days         Peripheral IV - Single Lumen 02/25/18 2200 Left;Medial Forearm 2 days         Peripheral IV - Single Lumen 02/26/18 1524 Left Forearm 1 day              Significant Labs:    CBC/Anemia Profile:    Recent Labs  Lab 02/27/18  0320 02/27/18  0813 02/28/18  0333   WBC 14.24*  --  14.13*   HGB 11.3*  --  11.2*   HCT 34.6*  --  34.8*     --  198   MCV 92  --  94   RDW 13.0  --  13.0   RETIC  --  1.1  --         Chemistries:    Recent Labs  Lab 02/27/18  0320 02/27/18  0748 02/27/18  1554 02/27/18  2300 02/28/18  0333   NA  --  139 140 139  139   K  --  4.5 4.9 4.7 4.7   CL  --  104 108 100 100   CO2  --  29 28 33* 31*   BUN  --  10 10 12 11   CREATININE  --  0.7 0.7 0.9 0.8   CALCIUM  --  8.0* 7.5* 8.3* 8.5*   ALBUMIN 3.0* 2.9* 2.6* 2.8* 2.9*  2.9*   PROT 5.4*  --   --   --  5.6*   BILITOT 0.1  --   --   --  0.2   ALKPHOS 44*  --   --   --  42*   ALT 17  --   --   --  25   AST 20  --   --   --  31   MG 2.0 2.0  --   --  2.2   PHOS 2.1* 2.5* 3.1 2.9 3.2  3.2       Recent Lab Results       02/28/18  0812 02/28/18  0343 02/28/18  0340 02/28/18  0333 02/27/18  2346      Immature Granulocytes    0.4      Immature Grans (Abs)    0.05  Comment:  Mild elevation in immature granulocytes is non specific and   can be seen in a variety of conditions including stress response,   acute inflammation, trauma and pregnancy. Correlation with other   laboratory and clinical findings is essential.  (H)      Albumin    2.9(L)          2.9(L)      Alkaline Phosphatase    42(L)      Allens Test  N/A   N/A     ALT    25      Anion Gap    8      AST    31      Baso #    0.01      Basophil%    0.1      Bilirubin, Direct    0.1      Total Bilirubin    0.2  Comment:  For infants and newborns, interpretation of results should be based  on gestational age, weight and in agreement with clinical  observations.  Premature Infant recommended reference ranges:  Up to 24 hours.............<8.0 mg/dL  Up to 48 hours............<12.0 mg/dL  3-5 days..................<15.0 mg/dL  6-29 days.................<15.0 mg/dL        Site  [facility]   [facility]     BUN, Bld    11      Calcium    8.5(L)      Chloride    100      CO2    31(H)      Creatinine    0.8      DelSys  Adult Vent   Adult Vent     Differential Method    Automated      eGFR if     >60.0      eGFR if non     >60.0  Comment:  Calculation used to obtain the estimated glomerular filtration  rate (eGFR) is the CKD-EPI equation.         Eos #    0.0      Eosinophil%    0.0      FiO2  40   40      Glucose    127(H)      Gran # (ANC)    12.8(H)      Gran%    90.1(H)      Hematocrit    34.8(L)      Hemoglobin    11.2(L)      Lactate, Ziyad    0.8  Comment:  Falsely low lactic acid results can be found in samples   containing >=13.0 mg/dL total bilirubin and/or >=3.5 mg/dL   direct bilirubin.        Lymph #    0.6(L)      Lymph%    4.4(L)      Magnesium    2.2      MCH    30.2      MCHC    32.2      MCV    94      Mode  AC/PRVC   AC/PRVC     Mono #    0.7      Mono%    5.0      MPV    10.1      nRBC    0      PEEP  8   10     Phosphorus    3.2          3.2      Platelets    198      POC BE  14   13     POC HCO3  38.3(H)   37.2(H)     POC PCO2  60.6(HH)   58.5(HH)     POC PH  7.409   7.412     POC PO2  106(H)   113(H)     POC SATURATED O2  98   98     POC TCO2  40(H)   39(H)     POCT Glucose 107  101       Potassium    4.7      Total Protein    5.6(L)      Rate  18   18     RBC    3.71(L)      RDW    13.0      Sample  ARTERIAL   ARTERIAL     Sodium    139      Sp02          Vancomycin-Trough          Vt  340   340     WBC    14.13(H)                  02/27/18  2309 02/27/18  2300 02/27/18  1923 02/27/18  1813 02/27/18  1813      Immature Granulocytes          Immature Grans (Abs)          Albumin  2.8(L)        Alkaline Phosphatase          Allens Test   N/A N/A      ALT          Anion Gap  6(L)        AST          Baso #          Basophil%          Bilirubin, Direct          Total Bilirubin          Site   [facility]      BUN, Bld  12        Calcium  8.3(L)        Chloride  100        CO2  33(H)        Creatinine  0.9        DelSys   Adult Vent Adult Vent      Differential Method          eGFR if   >60.0        eGFR if non   >60.0  Comment:  Calculation used to obtain the estimated glomerular filtration  rate (eGFR) is the CKD-EPI equation.           Eos #          Eosinophil%          FiO2   50 50      Glucose  146(H)        Gran # (ANC)          Gran%          Hematocrit           Hemoglobin          Lactate, Ziyad          Lymph #          Lymph%          Magnesium          MCH          MCHC          MCV          Mode   AC/PRVC AC/PRVC      Mono #          Mono%          MPV          nRBC          PEEP   10 12      Phosphorus  2.9        Platelets          POC BE   14 4      POC HCO3   39.8(H) 32.5(H)      POC PCO2   75.7(HH) 93.9(HH)      POC PH   7.328(L) 7.147(LL)      POC PO2   116(H) 107(H)      POC SATURATED O2   98 96      POC TCO2   42(H) 35(H)      POCT Glucose 131(H)    150(H)     Potassium  4.7        Total Protein          Rate   18 14      RBC          RDW          Sample   ARTERIAL ARTERIAL      Sodium  139        Sp02    95      Vancomycin-Trough          Vt   340 300      WBC                      02/27/18  1608 02/27/18  1554 02/27/18  1140 02/27/18  1133      Immature Granulocytes         Immature Grans (Abs)         Albumin  2.6(L)       Alkaline Phosphatase         Allens Test N/A        ALT         Anion Gap  4(L)       AST         Baso #         Basophil%         Bilirubin, Direct         Total Bilirubin         Site [facility]        BUN, Bld  10       Calcium  7.5(L)       Chloride  108       CO2  28       Creatinine  0.7       DelSys Adult Vent        Differential Method         eGFR if   >60.0       eGFR if non   >60.0  Comment:  Calculation used to obtain the estimated glomerular filtration  rate (eGFR) is the CKD-EPI equation.          Eos #         Eosinophil%         FiO2 40        Glucose  108       Gran # (ANC)         Gran%         Hematocrit         Hemoglobin         Lactate, Ziyad         Lymph #         Lymph%         Magnesium         MCH         MCHC         MCV         Mode AC/PRVC        Mono #         Mono%         MPV         nRBC         PEEP 5        Phosphorus  3.1       Platelets         POC BE 2        POC HCO3 28.4(H)        POC PCO2 60.0(HH)        POC PH 7.283(LL)        POC PO2 87        POC SATURATED O2 95         POC TCO2 30(H)        POCT Glucose   126(H)      Potassium  4.9       Total Protein         Rate 16        RBC         RDW         Sample ARTERIAL        Sodium  140       Sp02 96        Vancomycin-Trough    3.6(L)     Vt 360        WBC

## 2018-02-28 NOTE — ASSESSMENT & PLAN NOTE
--reportedly ran out of his albuterol, was requiring large quantities of nebulized treatment without relief at home the few days prior to admission.   --received continuous nebulizer for 1 hour, Magnesium, and Methylprednisolone 125 mg IV  -Intubated in ED  Active tobacco and marijuana smoker    Wheezing markedly improved this morning with improvement in expiratory waveform on ventilator.      -Continue paralysis and mechanical ventilation  -Albuterol and ipratropium via MDI Q4  -theophylline loaded and started on drip  -Methylpred 125 Q6  -Continue broad spectrum abx for concern over respiratory infection  -Daily CXR

## 2018-02-28 NOTE — PLAN OF CARE
Problem: Patient Care Overview  Goal: Plan of Care Review  Outcome: Ongoing (interventions implemented as appropriate)  No acute events overnight. VSS. Pt remains sedated, intubated, and paralyzed. Gtts infusing: Propofol, fentanyl, and nimbex with TOF monitoring. Labs trended. POCT glucose monitored. Bicarb gtt stopped this shift due to increased bicarb levels. UO excellent overnight. Pt turned q2h no skin breakdown noted. Plan of care reviewed with patients mother. Will continue to monitor.

## 2018-02-28 NOTE — PHYSICIAN QUERY
PT Name: Stanislaw Garcia  MR #: [id number]     Physician Query Form - Diagnosis Clarification      CDS/: Georgia Mishra               Contact information:[email or web address]    This form is a permanent document in the medical record.     Query Date: February 28, 2018    By submitting this query, we are merely seeking further clarification of documentation.  Please utilize your independent clinical judgment when addressing the question(s) below.     The medical record contains the following:      Findings Supporting Clinical Information Location in Medical Record   Lactic acidosis   Highest level 5.1 overnight  Improved to 3.5 this morning  Given 1L NS this AM and treating with broad spectrum abx for presumed sepsis and pneumonia although no definite radiographic features to suggest this at this time.   Patient has been hypotensive with MAPs in the high 60s but is not on pressors at this time. Tachycardic.   Suspect 2/2 albuterol, minimizing  Giving bicarb for metabolic acidosis.                                          Temp = 97.9  HR = 100  RR = 24 on vent  BP = 91/55  O2 Sat = 94%        Azithromycin IV  Ceftriaxone IV  Zosyn IV  Vancomycin IV  3500mL NS bolus IV        WBC = 18.86--->9.44  Lactate = 3.8--->5.1  Procalcitonin = 0.07  Blood cx = NGTD      FINDINGS: One view of the chest was obtained.  Endotracheal tube terminates approximately 3.5 cm above the alisa.  Nasogastric tube terminates within the gastric fundus. Cardiac silhouette is not enlarged. There is no focal consolidation. No sizable pleural effusion. No detrimental change in lung aeration. CCM PN 2/26                                        V/S Flowsheet 2/26                    MAR 2/25-current  MAR 2/25  MAR 2/26-current  MAR 2/26-current  MAR 2/25-2/26        Labs 2/25-2/26            CXR 2/25     Please clarify if the __pneumonia_____ diagnosis has been:    [  ] Ruled In  [  ] Ruled In, Now Resolved  [x  ] Ruled Out  [  ]  Clinically insignificant  [  ] Clinically undetermined  [  ] Other/Clarification of findings (please specify)_______________________________    Please document in your progress notes daily for the duration of treatment, until resolved, and include in your discharge summary.

## 2018-02-28 NOTE — PROGRESS NOTES
FIO2 and PEEP weaned to 40% and +8 per MD after ABG drawn. Pt tolerating well, sats remaining >97%. Will continue to monitor closely.

## 2018-02-28 NOTE — ASSESSMENT & PLAN NOTE
Resolved this morning since reducing amount of albuterol, suspect iatrogenic due to frequent bronchodilator therapy.   Tachycardia and hypotension now resolved  Still on broad spectrum antibiotics.

## 2018-03-01 PROBLEM — E87.20 LACTIC ACIDOSIS: Status: RESOLVED | Noted: 2018-02-26 | Resolved: 2018-03-01

## 2018-03-01 LAB
ALBUMIN SERPL BCP-MCNC: 3.1 G/DL
ALBUMIN SERPL BCP-MCNC: 3.1 G/DL
ALLENS TEST: ABNORMAL
ALP SERPL-CCNC: 41 U/L
ALT SERPL W/O P-5'-P-CCNC: 31 U/L
ANION GAP SERPL CALC-SCNC: 5 MMOL/L
AST SERPL-CCNC: 34 U/L
BASOPHILS # BLD AUTO: 0.01 K/UL
BASOPHILS NFR BLD: 0.1 %
BILIRUB DIRECT SERPL-MCNC: 0.1 MG/DL
BILIRUB SERPL-MCNC: 0.2 MG/DL
BUN SERPL-MCNC: 24 MG/DL
CALCIUM SERPL-MCNC: 8.9 MG/DL
CHLORIDE SERPL-SCNC: 100 MMOL/L
CO2 SERPL-SCNC: 32 MMOL/L
CREAT SERPL-MCNC: 0.7 MG/DL
DELSYS: ABNORMAL
DIFFERENTIAL METHOD: ABNORMAL
ENTEROVIRUS: NOT DETECTED
EOSINOPHIL # BLD AUTO: 0 K/UL
EOSINOPHIL NFR BLD: 0 %
ERYTHROCYTE [DISTWIDTH] IN BLOOD BY AUTOMATED COUNT: 12.7 %
ERYTHROCYTE [SEDIMENTATION RATE] IN BLOOD BY WESTERGREN METHOD: 14 MM/H
EST. GFR  (AFRICAN AMERICAN): >60 ML/MIN/1.73 M^2
EST. GFR  (NON AFRICAN AMERICAN): >60 ML/MIN/1.73 M^2
FIO2: 40
GLUCOSE SERPL-MCNC: 130 MG/DL
HCO3 UR-SCNC: 31.1 MMOL/L (ref 24–28)
HCT VFR BLD AUTO: 33.9 %
HGB BLD-MCNC: 11 G/DL
HUMAN BOCAVIRUS: NOT DETECTED
HUMAN CORONAVIRUS, COMMON COLD VIRUS: NOT DETECTED
IMM GRANULOCYTES # BLD AUTO: 0.09 K/UL
IMM GRANULOCYTES NFR BLD AUTO: 0.6 %
INFLUENZA A - H1N1-09: NOT DETECTED
LYMPHOCYTES # BLD AUTO: 0.7 K/UL
LYMPHOCYTES NFR BLD: 4.7 %
MAGNESIUM SERPL-MCNC: 2.3 MG/DL
MCH RBC QN AUTO: 30.6 PG
MCHC RBC AUTO-ENTMCNC: 32.4 G/DL
MCV RBC AUTO: 94 FL
MIN VOL: 6
MODE: ABNORMAL
MONOCYTES # BLD AUTO: 0.8 K/UL
MONOCYTES NFR BLD: 5.3 %
NEUTROPHILS # BLD AUTO: 13.8 K/UL
NEUTROPHILS NFR BLD: 89.3 %
NRBC BLD-RTO: 0 /100 WBC
PARAINFLUENZA: NOT DETECTED
PCO2 BLDA: 46.2 MMHG (ref 35–45)
PEEP: 8
PH SMN: 7.44 [PH] (ref 7.35–7.45)
PHOSPHATE SERPL-MCNC: 2.2 MG/DL
PHOSPHATE SERPL-MCNC: 2.2 MG/DL
PIP: 29
PLATELET # BLD AUTO: 193 K/UL
PMV BLD AUTO: 10.7 FL
PO2 BLDA: 120 MMHG (ref 80–100)
POC BE: 7 MMOL/L
POC SATURATED O2: 99 % (ref 95–100)
POC TCO2: 33 MMOL/L (ref 23–27)
POCT GLUCOSE: 105 MG/DL (ref 70–110)
POCT GLUCOSE: 119 MG/DL (ref 70–110)
POCT GLUCOSE: 84 MG/DL (ref 70–110)
POCT GLUCOSE: 96 MG/DL (ref 70–110)
POTASSIUM SERPL-SCNC: 4.4 MMOL/L
PROT SERPL-MCNC: 5.6 G/DL
RBC # BLD AUTO: 3.6 M/UL
RVP - ADENOVIRUS: NOT DETECTED
RVP - HUMAN METAPNEUMOVIRUS (HMPV): NOT DETECTED
RVP - INFLUENZA A: NOT DETECTED
RVP - INFLUENZA B: NOT DETECTED
RVP - RESPIRATORY SYNCTIAL VIRUS (RSV) A: NOT DETECTED
RVP - RESPIRATORY VIRAL PANEL, SOURCE: NORMAL
RVP - RHINOVIRUS: NOT DETECTED
SAMPLE: ABNORMAL
SITE: ABNORMAL
SODIUM SERPL-SCNC: 137 MMOL/L
SP02: 96
THEOPHYLLINE SERPL-MCNC: 7.2 UG/ML
VT: 400
WBC # BLD AUTO: 15.44 K/UL

## 2018-03-01 PROCEDURE — 27100171 HC OXYGEN HIGH FLOW UP TO 24 HOURS

## 2018-03-01 PROCEDURE — 83735 ASSAY OF MAGNESIUM: CPT

## 2018-03-01 PROCEDURE — 80069 RENAL FUNCTION PANEL: CPT

## 2018-03-01 PROCEDURE — 94640 AIRWAY INHALATION TREATMENT: CPT

## 2018-03-01 PROCEDURE — 20000000 HC ICU ROOM

## 2018-03-01 PROCEDURE — 63600175 PHARM REV CODE 636 W HCPCS: Performed by: GENERAL ACUTE CARE HOSPITAL

## 2018-03-01 PROCEDURE — 25000003 PHARM REV CODE 250: Performed by: STUDENT IN AN ORGANIZED HEALTH CARE EDUCATION/TRAINING PROGRAM

## 2018-03-01 PROCEDURE — 63600175 PHARM REV CODE 636 W HCPCS: Performed by: STUDENT IN AN ORGANIZED HEALTH CARE EDUCATION/TRAINING PROGRAM

## 2018-03-01 PROCEDURE — 99900026 HC AIRWAY MAINTENANCE (STAT)

## 2018-03-01 PROCEDURE — 80076 HEPATIC FUNCTION PANEL: CPT

## 2018-03-01 PROCEDURE — 25000003 PHARM REV CODE 250: Performed by: NURSE PRACTITIONER

## 2018-03-01 PROCEDURE — 63600175 PHARM REV CODE 636 W HCPCS: Performed by: EMERGENCY MEDICINE

## 2018-03-01 PROCEDURE — 63600175 PHARM REV CODE 636 W HCPCS: Performed by: NURSE PRACTITIONER

## 2018-03-01 PROCEDURE — A4216 STERILE WATER/SALINE, 10 ML: HCPCS | Performed by: NURSE PRACTITIONER

## 2018-03-01 PROCEDURE — 25000003 PHARM REV CODE 250: Performed by: INTERNAL MEDICINE

## 2018-03-01 PROCEDURE — 63600175 PHARM REV CODE 636 W HCPCS: Performed by: INTERNAL MEDICINE

## 2018-03-01 PROCEDURE — 94003 VENT MGMT INPAT SUBQ DAY: CPT

## 2018-03-01 PROCEDURE — 85025 COMPLETE CBC W/AUTO DIFF WBC: CPT

## 2018-03-01 PROCEDURE — 99291 CRITICAL CARE FIRST HOUR: CPT | Mod: ,,, | Performed by: INTERNAL MEDICINE

## 2018-03-01 PROCEDURE — 99900035 HC TECH TIME PER 15 MIN (STAT)

## 2018-03-01 PROCEDURE — 25000242 PHARM REV CODE 250 ALT 637 W/ HCPCS: Performed by: INTERNAL MEDICINE

## 2018-03-01 PROCEDURE — 80198 ASSAY OF THEOPHYLLINE: CPT

## 2018-03-01 PROCEDURE — 82803 BLOOD GASES ANY COMBINATION: CPT

## 2018-03-01 PROCEDURE — 37799 UNLISTED PX VASCULAR SURGERY: CPT

## 2018-03-01 RX ORDER — SODIUM,POTASSIUM PHOSPHATES 280-250MG
2 POWDER IN PACKET (EA) ORAL
Status: DISCONTINUED | OUTPATIENT
Start: 2018-03-01 | End: 2018-03-03

## 2018-03-01 RX ORDER — ALBUTEROL SULFATE 90 UG/1
2 AEROSOL, METERED RESPIRATORY (INHALATION) EVERY 4 HOURS
Status: DISCONTINUED | OUTPATIENT
Start: 2018-03-01 | End: 2018-03-01

## 2018-03-01 RX ORDER — ALBUTEROL SULFATE 90 UG/1
2 AEROSOL, METERED RESPIRATORY (INHALATION) EVERY 4 HOURS
Status: DISCONTINUED | OUTPATIENT
Start: 2018-03-01 | End: 2018-03-04 | Stop reason: HOSPADM

## 2018-03-01 RX ORDER — SODIUM,POTASSIUM PHOSPHATES 280-250MG
2 POWDER IN PACKET (EA) ORAL EVERY 4 HOURS
Status: DISPENSED | OUTPATIENT
Start: 2018-03-01 | End: 2018-03-01

## 2018-03-01 RX ORDER — METHYLPREDNISOLONE SOD SUCC 125 MG
125 VIAL (EA) INJECTION EVERY 12 HOURS
Status: DISCONTINUED | OUTPATIENT
Start: 2018-03-01 | End: 2018-03-01

## 2018-03-01 RX ORDER — POLYETHYLENE GLYCOL 3350 17 G/17G
17 POWDER, FOR SOLUTION ORAL DAILY
Status: DISCONTINUED | OUTPATIENT
Start: 2018-03-01 | End: 2018-03-01

## 2018-03-01 RX ORDER — FAMOTIDINE 20 MG/1
20 TABLET, FILM COATED ORAL 2 TIMES DAILY
Status: DISCONTINUED | OUTPATIENT
Start: 2018-03-01 | End: 2018-03-01

## 2018-03-01 RX ORDER — PREDNISONE 20 MG/1
40 TABLET ORAL DAILY
Status: DISCONTINUED | OUTPATIENT
Start: 2018-03-02 | End: 2018-03-04 | Stop reason: HOSPADM

## 2018-03-01 RX ORDER — FLUTICASONE FUROATE AND VILANTEROL 200; 25 UG/1; UG/1
1 POWDER RESPIRATORY (INHALATION) DAILY
Status: DISCONTINUED | OUTPATIENT
Start: 2018-03-01 | End: 2018-03-04 | Stop reason: HOSPADM

## 2018-03-01 RX ADMIN — FAMOTIDINE 20 MG: 20 TABLET, FILM COATED ORAL at 09:03

## 2018-03-01 RX ADMIN — Medication 3 ML: at 10:03

## 2018-03-01 RX ADMIN — ALBUTEROL SULFATE 2 PUFF: 90 AEROSOL, METERED RESPIRATORY (INHALATION) at 04:03

## 2018-03-01 RX ADMIN — METHYLPREDNISOLONE SODIUM SUCCINATE 125 MG: 125 INJECTION, POWDER, FOR SOLUTION INTRAMUSCULAR; INTRAVENOUS at 05:03

## 2018-03-01 RX ADMIN — Medication 200 MCG/HR: at 04:03

## 2018-03-01 RX ADMIN — Medication 3 ML: at 01:03

## 2018-03-01 RX ADMIN — ALBUTEROL SULFATE 2 PUFF: 90 AEROSOL, METERED RESPIRATORY (INHALATION) at 08:03

## 2018-03-01 RX ADMIN — ALBUTEROL SULFATE 4 PUFF: 90 AEROSOL, METERED RESPIRATORY (INHALATION) at 02:03

## 2018-03-01 RX ADMIN — ALBUTEROL SULFATE 2 PUFF: 90 AEROSOL, METERED RESPIRATORY (INHALATION) at 11:03

## 2018-03-01 RX ADMIN — PIPERACILLIN AND TAZOBACTAM 4.5 G: 4; .5 INJECTION, POWDER, LYOPHILIZED, FOR SOLUTION INTRAVENOUS; PARENTERAL at 12:03

## 2018-03-01 RX ADMIN — AZITHROMYCIN MONOHYDRATE 500 MG: 500 INJECTION, POWDER, LYOPHILIZED, FOR SOLUTION INTRAVENOUS at 11:03

## 2018-03-01 RX ADMIN — SODIUM CHLORIDE 20 ML/HR: 9 INJECTION, SOLUTION INTRAVENOUS at 06:03

## 2018-03-01 RX ADMIN — CHLORHEXIDINE GLUCONATE 15 ML: 1.2 RINSE ORAL at 09:03

## 2018-03-01 RX ADMIN — ALBUTEROL SULFATE 4 PUFF: 90 AEROSOL, METERED RESPIRATORY (INHALATION) at 05:03

## 2018-03-01 RX ADMIN — ALBUTEROL SULFATE 4 PUFF: 90 AEROSOL, METERED RESPIRATORY (INHALATION) at 10:03

## 2018-03-01 RX ADMIN — STANDARDIZED SENNA CONCENTRATE AND DOCUSATE SODIUM 1 TABLET: 8.6; 5 TABLET, FILM COATED ORAL at 09:03

## 2018-03-01 RX ADMIN — PIPERACILLIN AND TAZOBACTAM 4.5 G: 4; .5 INJECTION, POWDER, LYOPHILIZED, FOR SOLUTION INTRAVENOUS; PARENTERAL at 03:03

## 2018-03-01 RX ADMIN — METHYLPREDNISOLONE SODIUM SUCCINATE 125 MG: 125 INJECTION, POWDER, FOR SOLUTION INTRAMUSCULAR; INTRAVENOUS at 09:03

## 2018-03-01 RX ADMIN — POTASSIUM & SODIUM PHOSPHATES POWDER PACK 280-160-250 MG 2 PACKET: 280-160-250 PACK at 06:03

## 2018-03-01 RX ADMIN — FLUTICASONE FUROATE AND VILANTEROL TRIFENATATE 1 PUFF: 200; 25 POWDER RESPIRATORY (INHALATION) at 11:03

## 2018-03-01 RX ADMIN — Medication 3 ML: at 06:03

## 2018-03-01 RX ADMIN — PROPOFOL 50 MCG/KG/MIN: 10 INJECTION, EMULSION INTRAVENOUS at 01:03

## 2018-03-01 RX ADMIN — POTASSIUM & SODIUM PHOSPHATES POWDER PACK 280-160-250 MG 2 PACKET: 280-160-250 PACK at 02:03

## 2018-03-01 RX ADMIN — PIPERACILLIN AND TAZOBACTAM 4.5 G: 4; .5 INJECTION, POWDER, LYOPHILIZED, FOR SOLUTION INTRAVENOUS; PARENTERAL at 07:03

## 2018-03-01 RX ADMIN — POLYETHYLENE GLYCOL 3350 17 G: 17 POWDER, FOR SOLUTION ORAL at 09:03

## 2018-03-01 RX ADMIN — ENOXAPARIN SODIUM 40 MG: 100 INJECTION SUBCUTANEOUS at 05:03

## 2018-03-01 NOTE — ASSESSMENT & PLAN NOTE
Acute Hgb drop from 15.7 to 11.3. Stable at 11 today  Patient has been heavily fluid resuscitated, could represent dilutional component but was net negative 3 L yesterday without increase in Hgb.   Acute blood loss and lysis also in differential, but not worsening, will continue to monitor. No blood loss witnessed, no BMs since admission. Believe blood would have been visualized. Hemolysis labs negative.   Will continue to monitor

## 2018-03-01 NOTE — PROGRESS NOTES
[facility]  Critical Care Medicine  Progress Note    Patient Name: Stanislaw Garcia  MRN: [id number]  Admission Date: 2/25/2018  Hospital Length of Stay: 4 days  Code Status: Full Code  Attending Provider: Bharath Everett MD  Primary Care Provider: Primary Doctor No   Principal Problem: Severe persistent asthma with status asthmaticus    Subjective:     HPI:  Mr. Garcia is a 26 y/o male with history of asthma since childhood who presented to the ED with severe asthma exacerbation with acute hypercapnic and hypoxemic respiratory failure. He reportedly ran out of his albuterol.  History obtained from brother, Luis A, and patient's mother.  Mr. Garcia only takes an albuterol inhaler for his asthma and was reportedly well controlled until December when he presented to the ED with asthma exacerbation.  He was treated in the ED and discharged home.  He reportedly improved following exacerbation; however, again worsened 1 month ago.  Patient's mother reports that he has not been feeling well for 3 days.  No reports of sick contacts. His brother found him at ~3 pm this afternoon in respiratory distress and unresponsive.  His brother reported that he was at his baseline complaining of difficulty breathing 1 hours prior to his brother finding him.   EMS called and reported Spo2 50% on arrival at which time they began providing assisted breaths with an ambu with subsequent improvement in Spo2 90's.  On arrival to ED, he was placed on BiPAP 10/15 with continuous nebs.  He received 125 mg solumedrol, 2 mg Magnesium.  His mental status worsened with somnolence and VBG showing pH 7.0, PaCO2 81.  He was subsequently intubated.      Mr. Garcia lives with his brother Luis A in [place].  He has an older brother who lives in [place], [place], and his mother lives in [place], [place]. He reported smokes cigarettes (1 ppd) with occasional alcohol use.  No reports of drug use. Mr. Garcia does not see a Pulmonologist  and family are not sure who prescribes his albuterol.          Hospital/ICU Course:  Patient intubated in the ED and admitted to the MICU service. Sedated and paralyzed for severe bronchoconstriction to prevent asynchrony.     Received frequent bronchodilators and had combined gap, non-gap, respiratory acidoses. Patient developed mild pneumomediastinum and subcutaneous emphysema (not palpable) on imaging. Rate and tidal volumes set and permissive hypercapnia established. Started on bicarb drip to improve metabolic acidosis. Bronchodilators reduced (salome albuterol) due to lactic acidosis which improved.     Overnight 2/26-2/27 patient had worsening bronchospasm while bronchodilators administered. Occurred again with inhaled steroids 2/27.     Interval History/Significant Events: Respiratory status markedly improved over the last 1.5 days. Sedation turned off this morning and put on SBT with large tidal volumes and low respiratory rate. Extubated to NC but having desaturation to low to mid 80s so switched to high flow.     Review of Systems   Unable to perform ROS: Intubated     Objective:     Vital Signs (Most Recent):  Temp: 98.6 °F (37 °C) (03/01/18 0700)  Pulse: (!) 59 (03/01/18 1208)  Resp: (!) 28 (03/01/18 1208)  BP: (!) 140/76 (03/01/18 1000)  SpO2: 96 % (03/01/18 1208) Vital Signs (24h Range):  Temp:  [97.6 °F (36.4 °C)-98.6 °F (37 °C)] 98.6 °F (37 °C)  Pulse:  [] 59  Resp:  [8-35] 28  SpO2:  [90 %-100 %] 96 %  BP: (104-143)/(55-78) 140/76  Arterial Line BP: ()/() 105/82   Weight: 57.2 kg (126 lb 1.7 oz)  Body mass index is 20.35 kg/m².      Intake/Output Summary (Last 24 hours) at 03/01/18 1234  Last data filed at 03/01/18 1000   Gross per 24 hour   Intake          2046.29 ml   Output             1080 ml   Net           966.29 ml       Physical Exam   Constitutional: No distress. He is sedated and intubated.   HENT:   Head: Normocephalic and atraumatic.   Eyes: Right pupil is not reactive  (constricted bilaterally). Left pupil is not reactive. Pupils are equal.   Cardiovascular: Normal rate, regular rhythm and normal pulses.    No murmur heard.  Warm extremities   Pulmonary/Chest: He is intubated. He has no wheezes (No inspiratory or expiratory wheezes this morning).   No subcutaneous emphysema appreciated   Abdominal: Soft. He exhibits no distension and no mass. Tenderness:                                                                                                                                                                                Quiet bowel sounds   Genitourinary:   Genitourinary Comments: Urine catheter with clear, yellow drainage   Musculoskeletal: He exhibits no edema or deformity.   Lymphadenopathy:     He has no cervical adenopathy.   Neurological:   After sedation turned off, awake and alert. Following commands. Moving all extremities   Skin: Skin is warm and dry.   Nursing note and vitals reviewed.      Vents:  Vent Mode: Spont (03/01/18 0953)  Ventilator Initiated: Yes (02/25/18 1751)  Set Rate: 0 bmp (03/01/18 0953)  Vt Set: 400 mL (03/01/18 0953)  Pressure Support: 5 cmH20 (03/01/18 0953)  PEEP/CPAP: 5 cmH20 (03/01/18 0953)  Oxygen Concentration (%): 50 (03/01/18 1208)  Peak Airway Pressure: 12 cmH2O (03/01/18 0953)  Plateau Pressure: 17 cmH20 (03/01/18 0953)  Total Ve: 7.49 mL (03/01/18 0953)  F/VT Ratio<105 (RSBI): (!) 9.56 (03/01/18 0953)  Lines/Drains/Airways     Central Venous Catheter Line                 Trialysis (Dialysis) Catheter 02/26/18 2243 right internal jugular 2 days          Drain                 NG/OG Tube 02/25/18 1657 Hickman sump 18 Fr. Right mouth 3 days         Urethral Catheter 02/25/18 1809 Latex 16 Fr. 3 days          Arterial Line                 Arterial Line 02/25/18 1930 Left Radial 3 days          Peripheral Intravenous Line                 Peripheral IV - Single Lumen 02/25/18 1606 Left Forearm 3 days         Peripheral IV - Single Lumen  02/26/18 1524 Left Forearm 2 days         Peripheral IV - Single Lumen 03/01/18 0549 Right Wrist less than 1 day              Significant Labs:    CBC/Anemia Profile:    Recent Labs  Lab 02/28/18  0333 03/01/18  0400   WBC 14.13* 15.44*   HGB 11.2* 11.0*   HCT 34.8* 33.9*    193   MCV 94 94   RDW 13.0 12.7        Chemistries:    Recent Labs  Lab 02/27/18  2300 02/28/18  0333 03/01/18  0400    139 137   K 4.7 4.7 4.4    100 100   CO2 33* 31* 32*   BUN 12 11 24*   CREATININE 0.9 0.8 0.7   CALCIUM 8.3* 8.5* 8.9   ALBUMIN 2.8* 2.9*  2.9* 3.1*  3.1*   PROT  --  5.6* 5.6*   BILITOT  --  0.2 0.2   ALKPHOS  --  42* 41*   ALT  --  25 31   AST  --  31 34   MG  --  2.2 2.3   PHOS 2.9 3.2  3.2 2.2*  2.2*       Recent Lab Results       03/01/18  0400 03/01/18  0334 03/01/18  0013 02/28/18  2107 02/28/18  1750      Immature Granulocytes 0.6(H)         Immature Grans (Abs) 0.09  Comment:  Mild elevation in immature granulocytes is non specific and   can be seen in a variety of conditions including stress response,   acute inflammation, trauma and pregnancy. Correlation with other   laboratory and clinical findings is essential.  (H)         Albumin 3.1(L)          3.1(L)         Alkaline Phosphatase 41(L)         Allens Test  N/A  N/A      ALT 31         Anion Gap 5(L)         AST 34         Baso # 0.01         Basophil% 0.1         Bilirubin, Direct 0.1         Total Bilirubin 0.2  Comment:  For infants and newborns, interpretation of results should be based  on gestational age, weight and in agreement with clinical  observations.  Premature Infant recommended reference ranges:  Up to 24 hours.............<8.0 mg/dL  Up to 48 hours............<12.0 mg/dL  3-5 days..................<15.0 mg/dL  6-29 days.................<15.0 mg/dL           Site  Sobeida/UAC  Sobeida/UAC      BUN, Bld 24(H)         Calcium 8.9         Chloride 100         CO2 32(H)         Creatinine 0.7         DelSys  Adult Vent  Adult Vent       Differential Method Automated         eGFR if  >60.0         eGFR if non  >60.0  Comment:  Calculation used to obtain the estimated glomerular filtration  rate (eGFR) is the CKD-EPI equation.            Eos # 0.0         Eosinophil% 0.0         FiO2  40  40      Glucose 130(H)         Gran # (ANC) 13.8(H)         Gran% 89.3(H)         Hematocrit 33.9(L)         Hemoglobin 11.0(L)         Lymph # 0.7(L)         Lymph% 4.7(L)         Magnesium 2.3         MCH 30.6         MCHC 32.4         MCV 94         Min Vol  6  6      Mode  AC/PRVC  AC/PRVC      Mono # 0.8         Mono% 5.3         MPV 10.7         nRBC 0         PEEP  8  8      Phosphorus 2.2(L)          2.2(L)         PiP  29  32      Platelets 193         POC BE  7  4      POC HCO3  31.1(H)  29.2(H)      POC PCO2  46.2(H)  47.2(H)      POC PH  7.437  7.400      POC PO2  120(H)  98      POC SATURATED O2  99  98      POC TCO2  33(H)  31(H)      POCT Glucose   119(H)  117(H)     Potassium 4.4         Total Protein 5.6(L)         Rate  14  14      RBC 3.60(L)         RDW 12.7         Sample  ARTERIAL  ARTERIAL      Sodium 137         Sp02  96  97      Theophylline Lvl 7.2  Comment:  Toxic: >20.0 ug/mL(L)         Vt  400  400      WBC 15.44(H)                     02/28/18  1554 02/28/18  1532      Immature Granulocytes       Immature Grans (Abs)       Albumin       Alkaline Phosphatase       Allens Test N/A      ALT       Anion Gap       AST       Baso #       Basophil%       Bilirubin, Direct       Total Bilirubin       Site Sobeida/UAC      BUN, Bld       Calcium       Chloride       CO2       Creatinine       DelSys Adult Vent      Differential Method       eGFR if        eGFR if non        Eos #       Eosinophil%       FiO2 40      Glucose       Gran # (ANC)       Gran%       Hematocrit       Hemoglobin       Lymph #       Lymph%       Magnesium       MCH       MCHC       MCV       Min Vol        Mode AC/PRVC      Mono #       Mono%       MPV       nRBC       PEEP 8      Phosphorus       PiP       Platelets       POC BE 8      POC HCO3 33.8(H)      POC PCO2 59.1(HH)      POC PH 7.366      POC PO2 104(H)      POC SATURATED O2 98      POC TCO2 36(H)      POCT Glucose       Potassium       Total Protein       Rate 14      RBC       RDW       Sample ARTERIAL      Sodium       Sp02 99      Theophylline Lvl  7.2  Comment:  Toxic: >20.0 ug/mL(L)     Vt 400      WBC               Assessment/Plan:     Neuro   Encephalopathy, metabolic    Concern for hypoxic injury. No indication of issues after sedation turned off. Will reassess in afternoon s/p extubation. No metabolic derangements on lab which would cause encephalopathy.         Pulmonary   * Severe persistent asthma with status asthmaticus    --reportedly ran out of his albuterol, was requiring large quantities of nebulized treatment without relief at home the few days prior to admission.   --received continuous nebulizer for 1 hour, Magnesium, and Methylprednisolone 125 mg IV  -Intubated in ED  Active tobacco and marijuana smoker    Wheezing markedly improved this morning with normal expiratory waveform on ventilator.      -Paralytics stopped last night. Sedation stopped this AM and patient extubated.   -Albuterol and ipratropium via MDI Q4, reducing to 2 puffs each.   -theophylline loaded and started on drip. Level at goal, will continue  -Reduced steroid dose today.   -De-escalating antibiotic therapy, vanc stopped. Will continue pip-pedro and azithro for now.         Acute respiratory failure with hypoxia and hypercapnia    As under severe persistent asthma        Oncology   Anemia    Acute Hgb drop from 15.7 to 11.3. Stable at 11 today  Patient has been heavily fluid resuscitated, could represent dilutional component but was net negative 3 L yesterday without increase in Hgb.   Acute blood loss and lysis also in differential, but not worsening, will continue to  monitor. No blood loss witnessed, no BMs since admission. Believe blood would have been visualized. Hemolysis labs negative.   Will continue to monitor           Critical Care Daily Checklist:    A: Awake: RASS Goal/Actual Goal:    Actual: Moran Agitation Sedation Scale (RASS): Drowsy   B: Spontaneous Breathing Trial Performed?     C: SAT & SBT Coordinated?  Yes                      D: Delirium: CAM-ICU     E: Early Mobility Performed? No   F: Feeding Goal: Goals: Patient to receive nutrition by RD follow-up  Status: Nutrition Goal Status: new   Current Diet Order   Procedures    Diet NPO      AS: Analgesia/Sedation Stopped   T: Thromboembolic Prophylaxis Lovenox   H: HOB > 300 Yes   U: Stress Ulcer Prophylaxis (if needed) pepcid   G: Glucose Control At goal   B: Bowel Function     I: Indwelling Catheter (Lines & Wood) Necessity Bowel regimen, stopping opiates   D: De-escalation of Antimicrobials/Pharmacotherapies Stopped Vanc today    Plan for the day/ETD Monitor respiratory status    Code Status:  Family/Goals of Care: Full Code         Critical secondary to Patient has a condition that poses threat to life and bodily function: Severe Respiratory Distress      Critical care was time spent personally by me on the following activities: development of treatment plan with patient or surrogate and bedside caregivers, discussions with consultants, evaluation of patient's response to treatment, examination of patient, ordering and performing treatments and interventions, ordering and review of laboratory studies, ordering and review of radiographic studies, pulse oximetry, re-evaluation of patient's condition. This critical care time did not overlap with that of any other provider or involve time for any procedures.     Zaki Holloway MD   Internal Medicine PGY-1  [phone]

## 2018-03-01 NOTE — ASSESSMENT & PLAN NOTE
Concern for hypoxic injury. No indication of issues after sedation turned off. Will reassess in afternoon s/p extubation. No metabolic derangements on lab which would cause encephalopathy.

## 2018-03-01 NOTE — SUBJECTIVE & OBJECTIVE
Interval History/Significant Events: Respiratory status markedly improved over the last 1.5 days. Sedation turned off this morning and put on SBT with large tidal volumes and low respiratory rate. Extubated to NC but having desaturation to low to mid 80s so switched to high flow.     Review of Systems   Unable to perform ROS: Intubated     Objective:     Vital Signs (Most Recent):  Temp: 98.6 °F (37 °C) (03/01/18 0700)  Pulse: (!) 59 (03/01/18 1208)  Resp: (!) 28 (03/01/18 1208)  BP: (!) 140/76 (03/01/18 1000)  SpO2: 96 % (03/01/18 1208) Vital Signs (24h Range):  Temp:  [97.6 °F (36.4 °C)-98.6 °F (37 °C)] 98.6 °F (37 °C)  Pulse:  [] 59  Resp:  [8-35] 28  SpO2:  [90 %-100 %] 96 %  BP: (104-143)/(55-78) 140/76  Arterial Line BP: ()/() 105/82   Weight: 57.2 kg (126 lb 1.7 oz)  Body mass index is 20.35 kg/m².      Intake/Output Summary (Last 24 hours) at 03/01/18 1234  Last data filed at 03/01/18 1000   Gross per 24 hour   Intake          2046.29 ml   Output             1080 ml   Net           966.29 ml       Physical Exam   Constitutional: No distress. He is sedated and intubated.   HENT:   Head: Normocephalic and atraumatic.   Eyes: Right pupil is not reactive (constricted bilaterally). Left pupil is not reactive. Pupils are equal.   Cardiovascular: Normal rate, regular rhythm and normal pulses.    No murmur heard.  Warm extremities   Pulmonary/Chest: He is intubated. He has no wheezes (No inspiratory or expiratory wheezes this morning).   No subcutaneous emphysema appreciated   Abdominal: Soft. He exhibits no distension and no mass. Tenderness:                                                                                                                                                                                Quiet bowel sounds   Genitourinary:   Genitourinary Comments: Urine catheter with clear, yellow drainage   Musculoskeletal: He exhibits no edema or deformity.   Lymphadenopathy:     He  has no cervical adenopathy.   Neurological:   After sedation turned off, awake and alert. Following commands. Moving all extremities   Skin: Skin is warm and dry.   Nursing note and vitals reviewed.      Vents:  Vent Mode: Spont (03/01/18 0953)  Ventilator Initiated: Yes (02/25/18 1751)  Set Rate: 0 bmp (03/01/18 0953)  Vt Set: 400 mL (03/01/18 0953)  Pressure Support: 5 cmH20 (03/01/18 0953)  PEEP/CPAP: 5 cmH20 (03/01/18 0953)  Oxygen Concentration (%): 50 (03/01/18 1208)  Peak Airway Pressure: 12 cmH2O (03/01/18 0953)  Plateau Pressure: 17 cmH20 (03/01/18 0953)  Total Ve: 7.49 mL (03/01/18 0953)  F/VT Ratio<105 (RSBI): (!) 9.56 (03/01/18 0953)  Lines/Drains/Airways     Central Venous Catheter Line                 Trialysis (Dialysis) Catheter 02/26/18 2243 right internal jugular 2 days          Drain                 NG/OG Tube 02/25/18 1657 [place] sump 18 Fr. Right mouth 3 days         Urethral Catheter 02/25/18 1809 Latex 16 Fr. 3 days          Arterial Line                 Arterial Line 02/25/18 1930 Left Radial 3 days          Peripheral Intravenous Line                 Peripheral IV - Single Lumen 02/25/18 1606 Left Forearm 3 days         Peripheral IV - Single Lumen 02/26/18 1524 Left Forearm 2 days         Peripheral IV - Single Lumen 03/01/18 0549 Right Wrist less than 1 day              Significant Labs:    CBC/Anemia Profile:    Recent Labs  Lab 02/28/18  0333 03/01/18  0400   WBC 14.13* 15.44*   HGB 11.2* 11.0*   HCT 34.8* 33.9*    193   MCV 94 94   RDW 13.0 12.7        Chemistries:    Recent Labs  Lab 02/27/18  2300 02/28/18  0333 03/01/18  0400    139 137   K 4.7 4.7 4.4    100 100   CO2 33* 31* 32*   BUN 12 11 24*   CREATININE 0.9 0.8 0.7   CALCIUM 8.3* 8.5* 8.9   ALBUMIN 2.8* 2.9*  2.9* 3.1*  3.1*   PROT  --  5.6* 5.6*   BILITOT  --  0.2 0.2   ALKPHOS  --  42* 41*   ALT  --  25 31   AST  --  31 34   MG  --  2.2 2.3   PHOS 2.9 3.2  3.2 2.2*  2.2*       Recent Lab Results        03/01/18  0400 03/01/18  0334 03/01/18  0013 02/28/18  2107 02/28/18  1750      Immature Granulocytes 0.6(H)         Immature Grans (Abs) 0.09  Comment:  Mild elevation in immature granulocytes is non specific and   can be seen in a variety of conditions including stress response,   acute inflammation, trauma and pregnancy. Correlation with other   laboratory and clinical findings is essential.  (H)         Albumin 3.1(L)          3.1(L)         Alkaline Phosphatase 41(L)         Allens Test  N/A  N/A      ALT 31         Anion Gap 5(L)         AST 34         Baso # 0.01         Basophil% 0.1         Bilirubin, Direct 0.1         Total Bilirubin 0.2  Comment:  For infants and newborns, interpretation of results should be based  on gestational age, weight and in agreement with clinical  observations.  Premature Infant recommended reference ranges:  Up to 24 hours.............<8.0 mg/dL  Up to 48 hours............<12.0 mg/dL  3-5 days..................<15.0 mg/dL  6-29 days.................<15.0 mg/dL           Site  [place]      BUN, Bld 24(H)         Calcium 8.9         Chloride 100         CO2 32(H)         Creatinine 0.7         DelSys  Adult Vent  Adult Vent      Differential Method Automated         eGFR if  >60.0         eGFR if non  >60.0  Comment:  Calculation used to obtain the estimated glomerular filtration  rate (eGFR) is the CKD-EPI equation.            Eos # 0.0         Eosinophil% 0.0         FiO2  40  40      Glucose 130(H)         Gran # (ANC) 13.8(H)         Gran% 89.3(H)         Hematocrit 33.9(L)         Hemoglobin 11.0(L)         Lymph # 0.7(L)         Lymph% 4.7(L)         Magnesium 2.3         MCH 30.6         MCHC 32.4         MCV 94         Min Vol  6  6      Mode  AC/PRVC  AC/PRVC      Mono # 0.8         Mono% 5.3         MPV 10.7         nRBC 0         PEEP  8  8      Phosphorus 2.2(L)          2.2(L)         PiP  29  32      Platelets 193          POC BE  7  4      POC HCO3  31.1(H)  29.2(H)      POC PCO2  46.2(H)  47.2(H)      POC PH  7.437  7.400      POC PO2  120(H)  98      POC SATURATED O2  99  98      POC TCO2  33(H)  31(H)      POCT Glucose   119(H)  117(H)     Potassium 4.4         Total Protein 5.6(L)         Rate  14  14      RBC 3.60(L)         RDW 12.7         Sample  ARTERIAL  ARTERIAL      Sodium 137         Sp02  96  97      Theophylline Lvl 7.2  Comment:  Toxic: >20.0 ug/mL(L)         Vt  400  400      WBC 15.44(H)                     02/28/18  1554 02/28/18  1532      Immature Granulocytes       Immature Grans (Abs)       Albumin       Alkaline Phosphatase       Allens Test N/A      ALT       Anion Gap       AST       Baso #       Basophil%       Bilirubin, Direct       Total Bilirubin       Site Sobeida/UAC      BUN, Bld       Calcium       Chloride       CO2       Creatinine       DelSys Adult Vent      Differential Method       eGFR if        eGFR if non        Eos #       Eosinophil%       FiO2 40      Glucose       Gran # (ANC)       Gran%       Hematocrit       Hemoglobin       Lymph #       Lymph%       Magnesium       MCH       MCHC       MCV       Min Vol       Mode AC/PRVC      Mono #       Mono%       MPV       nRBC       PEEP 8      Phosphorus       PiP       Platelets       POC BE 8      POC HCO3 33.8(H)      POC PCO2 59.1(HH)      POC PH 7.366      POC PO2 104(H)      POC SATURATED O2 98      POC TCO2 36(H)      POCT Glucose       Potassium       Total Protein       Rate 14      RBC       RDW       Sample ARTERIAL      Sodium       Sp02 99      Theophylline Lvl  7.2  Comment:  Toxic: >20.0 ug/mL(L)     Vt 400      WBC

## 2018-03-01 NOTE — ASSESSMENT & PLAN NOTE
--reportedly ran out of his albuterol, was requiring large quantities of nebulized treatment without relief at home the few days prior to admission.   --received continuous nebulizer for 1 hour, Magnesium, and Methylprednisolone 125 mg IV  -Intubated in ED  Active tobacco and marijuana smoker    Wheezing markedly improved this morning with normal expiratory waveform on ventilator.      -Paralytics stopped last night. Sedation stopped this AM and patient extubated.   -Albuterol and ipratropium via MDI Q4, reducing to 2 puffs each.   -theophylline loaded and started on drip. Level at goal, will continue  -Reduced steroid dose today.   -De-escalating antibiotic therapy, vanc stopped. Will continue pip-pedro and azithro for now.

## 2018-03-02 PROBLEM — R10.84 GENERALIZED ABDOMINAL PAIN: Status: ACTIVE | Noted: 2018-03-02

## 2018-03-02 PROBLEM — J96.90 RESPIRATORY FAILURE: Status: ACTIVE | Noted: 2018-03-02

## 2018-03-02 LAB
ALBUMIN SERPL BCP-MCNC: 3.1 G/DL
ALBUMIN SERPL BCP-MCNC: 3.1 G/DL
ALP SERPL-CCNC: 39 U/L
ALT SERPL W/O P-5'-P-CCNC: 55 U/L
ANION GAP SERPL CALC-SCNC: 11 MMOL/L
AST SERPL-CCNC: 70 U/L
BACTERIA BLD CULT: NORMAL
BACTERIA BLD CULT: NORMAL
BASOPHILS # BLD AUTO: 0.01 K/UL
BASOPHILS NFR BLD: 0.1 %
BILIRUB DIRECT SERPL-MCNC: 0.2 MG/DL
BILIRUB SERPL-MCNC: 0.3 MG/DL
BUN SERPL-MCNC: 25 MG/DL
C DIFF GDH STL QL: NEGATIVE
C DIFF TOX A+B STL QL IA: NEGATIVE
CALCIUM SERPL-MCNC: 9.4 MG/DL
CHLORIDE SERPL-SCNC: 101 MMOL/L
CO2 SERPL-SCNC: 29 MMOL/L
CREAT SERPL-MCNC: 0.9 MG/DL
DIFFERENTIAL METHOD: ABNORMAL
EOSINOPHIL # BLD AUTO: 0 K/UL
EOSINOPHIL NFR BLD: 0 %
ERYTHROCYTE [DISTWIDTH] IN BLOOD BY AUTOMATED COUNT: 12.3 %
EST. GFR  (AFRICAN AMERICAN): >60 ML/MIN/1.73 M^2
EST. GFR  (NON AFRICAN AMERICAN): >60 ML/MIN/1.73 M^2
GLUCOSE SERPL-MCNC: 107 MG/DL
HCT VFR BLD AUTO: 33.9 %
HGB BLD-MCNC: 11.2 G/DL
IMM GRANULOCYTES # BLD AUTO: 0.1 K/UL
IMM GRANULOCYTES NFR BLD AUTO: 0.7 %
LIPASE SERPL-CCNC: 74 U/L
LYMPHOCYTES # BLD AUTO: 0.7 K/UL
LYMPHOCYTES NFR BLD: 5.3 %
MAGNESIUM SERPL-MCNC: 2.1 MG/DL
MCH RBC QN AUTO: 30.8 PG
MCHC RBC AUTO-ENTMCNC: 33 G/DL
MCV RBC AUTO: 93 FL
MONOCYTES # BLD AUTO: 0.7 K/UL
MONOCYTES NFR BLD: 5 %
NEUTROPHILS # BLD AUTO: 12 K/UL
NEUTROPHILS NFR BLD: 88.9 %
NRBC BLD-RTO: 0 /100 WBC
PHOSPHATE SERPL-MCNC: 2.8 MG/DL
PHOSPHATE SERPL-MCNC: 2.8 MG/DL
PLATELET # BLD AUTO: 181 K/UL
PMV BLD AUTO: 10.3 FL
POCT GLUCOSE: 110 MG/DL (ref 70–110)
POCT GLUCOSE: 85 MG/DL (ref 70–110)
POCT GLUCOSE: 92 MG/DL (ref 70–110)
POCT GLUCOSE: 99 MG/DL (ref 70–110)
POTASSIUM SERPL-SCNC: 3.4 MMOL/L
PROT SERPL-MCNC: 5.8 G/DL
RBC # BLD AUTO: 3.64 M/UL
SODIUM SERPL-SCNC: 141 MMOL/L
WBC # BLD AUTO: 13.46 K/UL

## 2018-03-02 PROCEDURE — 99233 SBSQ HOSP IP/OBS HIGH 50: CPT | Mod: ,,, | Performed by: INTERNAL MEDICINE

## 2018-03-02 PROCEDURE — A4216 STERILE WATER/SALINE, 10 ML: HCPCS | Performed by: NURSE PRACTITIONER

## 2018-03-02 PROCEDURE — 63600175 PHARM REV CODE 636 W HCPCS: Performed by: INTERNAL MEDICINE

## 2018-03-02 PROCEDURE — 97803 MED NUTRITION INDIV SUBSEQ: CPT

## 2018-03-02 PROCEDURE — 83735 ASSAY OF MAGNESIUM: CPT

## 2018-03-02 PROCEDURE — 83690 ASSAY OF LIPASE: CPT

## 2018-03-02 PROCEDURE — 25000003 PHARM REV CODE 250: Performed by: NURSE PRACTITIONER

## 2018-03-02 PROCEDURE — 25000003 PHARM REV CODE 250: Performed by: STUDENT IN AN ORGANIZED HEALTH CARE EDUCATION/TRAINING PROGRAM

## 2018-03-02 PROCEDURE — 63600175 PHARM REV CODE 636 W HCPCS: Performed by: NURSE PRACTITIONER

## 2018-03-02 PROCEDURE — 85025 COMPLETE CBC W/AUTO DIFF WBC: CPT

## 2018-03-02 PROCEDURE — 87449 NOS EACH ORGANISM AG IA: CPT

## 2018-03-02 PROCEDURE — 80076 HEPATIC FUNCTION PANEL: CPT

## 2018-03-02 PROCEDURE — 11000001 HC ACUTE MED/SURG PRIVATE ROOM

## 2018-03-02 PROCEDURE — 80069 RENAL FUNCTION PANEL: CPT

## 2018-03-02 PROCEDURE — 63600175 PHARM REV CODE 636 W HCPCS: Performed by: STUDENT IN AN ORGANIZED HEALTH CARE EDUCATION/TRAINING PROGRAM

## 2018-03-02 PROCEDURE — 94640 AIRWAY INHALATION TREATMENT: CPT

## 2018-03-02 RX ORDER — POTASSIUM CHLORIDE 20 MEQ/15ML
40 SOLUTION ORAL ONCE
Status: COMPLETED | OUTPATIENT
Start: 2018-03-02 | End: 2018-03-02

## 2018-03-02 RX ORDER — IBUPROFEN 400 MG/1
400 TABLET ORAL EVERY 6 HOURS PRN
Status: DISCONTINUED | OUTPATIENT
Start: 2018-03-02 | End: 2018-03-02

## 2018-03-02 RX ORDER — ACETAMINOPHEN 325 MG/1
650 TABLET ORAL EVERY 6 HOURS PRN
Status: DISCONTINUED | OUTPATIENT
Start: 2018-03-02 | End: 2018-03-04 | Stop reason: HOSPADM

## 2018-03-02 RX ORDER — ONDANSETRON 4 MG/1
4 TABLET, ORALLY DISINTEGRATING ORAL EVERY 6 HOURS PRN
Status: DISCONTINUED | OUTPATIENT
Start: 2018-03-02 | End: 2018-03-04 | Stop reason: HOSPADM

## 2018-03-02 RX ORDER — FAMOTIDINE 20 MG/1
20 TABLET, FILM COATED ORAL 2 TIMES DAILY
Status: DISCONTINUED | OUTPATIENT
Start: 2018-03-02 | End: 2018-03-03

## 2018-03-02 RX ORDER — HYDROCODONE BITARTRATE AND ACETAMINOPHEN 5; 325 MG/1; MG/1
1 TABLET ORAL EVERY 6 HOURS PRN
Status: DISCONTINUED | OUTPATIENT
Start: 2018-03-02 | End: 2018-03-04 | Stop reason: HOSPADM

## 2018-03-02 RX ADMIN — Medication 3 ML: at 09:03

## 2018-03-02 RX ADMIN — FAMOTIDINE 20 MG: 20 TABLET, FILM COATED ORAL at 10:03

## 2018-03-02 RX ADMIN — POTASSIUM CHLORIDE 40 MEQ: 20 SOLUTION ORAL at 05:03

## 2018-03-02 RX ADMIN — PIPERACILLIN AND TAZOBACTAM 4.5 G: 4; .5 INJECTION, POWDER, LYOPHILIZED, FOR SOLUTION INTRAVENOUS; PARENTERAL at 03:03

## 2018-03-02 RX ADMIN — ALBUTEROL SULFATE 2 PUFF: 90 AEROSOL, METERED RESPIRATORY (INHALATION) at 08:03

## 2018-03-02 RX ADMIN — Medication 3 ML: at 06:03

## 2018-03-02 RX ADMIN — PIPERACILLIN AND TAZOBACTAM 4.5 G: 4; .5 INJECTION, POWDER, LYOPHILIZED, FOR SOLUTION INTRAVENOUS; PARENTERAL at 10:03

## 2018-03-02 RX ADMIN — FLUTICASONE FUROATE AND VILANTEROL TRIFENATATE 1 PUFF: 200; 25 POWDER RESPIRATORY (INHALATION) at 08:03

## 2018-03-02 RX ADMIN — ACETAMINOPHEN 650 MG: 325 TABLET ORAL at 10:03

## 2018-03-02 RX ADMIN — ENOXAPARIN SODIUM 40 MG: 100 INJECTION SUBCUTANEOUS at 05:03

## 2018-03-02 RX ADMIN — ALBUTEROL SULFATE 2 PUFF: 90 AEROSOL, METERED RESPIRATORY (INHALATION) at 05:03

## 2018-03-02 RX ADMIN — PREDNISONE 40 MG: 20 TABLET ORAL at 10:03

## 2018-03-02 RX ADMIN — ALBUTEROL SULFATE 2 PUFF: 90 AEROSOL, METERED RESPIRATORY (INHALATION) at 12:03

## 2018-03-02 NOTE — ASSESSMENT & PLAN NOTE
Generalized with possible periumbilical localization  No significant tenderness on exam although patient reports pain with palpation. No other indications of significant pathology  Hyperactive bowel sounds, had been minimally active. Also appears mildly distended and has hiccups. Suspect bowels starting to move and may have some gas which is causing pain.   Will do serial abdominal exams today, will consider de-escalating antibiotics. Pepcid for possible gastric causes. Will work up further if condition changes

## 2018-03-02 NOTE — RESIDENT HANDOFF
Handoff     Primary Team: [facility]ITICAL CARE MEDICINE Room Number: 6079/6079 A     Patient Name: Stanislaw Garcia MRN: [id number]     Date of Birth: [id number] Allergies: Patient has no known allergies.     Age: 27 y.o. Admit Date: 2/25/2018     Sex: male  BMI: Body mass index is 20.35 kg/m².     Code Status: Full Code        Illness Level (current clinical status): Watcher - No    Reason for Admission: Severe persistent asthma with status asthmaticus    Brief HPI (pertinent PMH and diagnosis or differential diagnosis): 27 M with asthma since childhood, not following with pulmonologist and currently only on albuterol presented with asthma attack beginning few days prior to admission. Day of admission found down, profoundly hypoxic on EMS arrival. Intubated in ED and had severe bronchospasm with intermittent difficulty ventilating despite intubation and mechanical ventilation. Also with lactic acidosis thought secondary to significant albuterol doses.     Was trialed on albuterol, ipratropium, inhaled and systemic steroids. Stopped using nebulizers as they seemed to cause worsening bronchospasms not noted on MDI. On 2/28 breathing markedly improved. Extubated on 3/1.     Doing well but had abdominal pain on 3/2. Nonspecific with diarrhea and benign exam. Lipase just above normal range, KUB unremarkable.  Transaminitis so RUQ US performed.     Tasks (specific, using if-then statements):   1. Severe asthma- Continue MDI and breo. Discharge with Pulm follow up (referral placed) fellow clinic in 4 weeks and with 1 year supply of Breo at current dose. Needs smoking cessation.     2. Debility- First day of PT/OT 3/2. Has been paralzyed on high dose steroids and will likely be weak.     3. Abdominal pain- unclear etiology. Non-surgical abdomen. F/u C.diff.    Estimated Discharge Date: 3/3    Discharge Disposition: Home or Self Care    Mentored By: Dr. Everett

## 2018-03-02 NOTE — PROGRESS NOTES
[facility]  Critical Care Medicine  Progress Note    Patient Name: Stanislaw Garcia  MRN: [id number]  Admission Date: 2/25/2018  Hospital Length of Stay: 5 days  Code Status: Full Code  Attending Provider: Bharath Everett MD  Primary Care Provider: Primary Doctor No   Principal Problem: Severe persistent asthma with status asthmaticus    Subjective:     HPI:  Mr. Garcia is a 28 y/o male with history of asthma since childhood who presented to the ED with severe asthma exacerbation with acute hypercapnic and hypoxemic respiratory failure. He reportedly ran out of his albuterol.  History obtained from brother, Luis A, and patient's mother.  Mr. Garcia only takes an albuterol inhaler for his asthma and was reportedly well controlled until December when he presented to the ED with asthma exacerbation.  He was treated in the ED and discharged home.  He reportedly improved following exacerbation; however, again worsened 1 month ago.  Patient's mother reports that he has not been feeling well for 3 days.  No reports of sick contacts. His brother found him at ~3 pm this afternoon in respiratory distress and unresponsive.  His brother reported that he was at his baseline complaining of difficulty breathing 1 hours prior to his brother finding him.   EMS called and reported Spo2 50% on arrival at which time they began providing assisted breaths with an ambu with subsequent improvement in Spo2 90's.  On arrival to ED, he was placed on BiPAP 10/15 with continuous nebs.  He received 125 mg solumedrol, 2 mg Magnesium.  His mental status worsened with somnolence and VBG showing pH 7.0, PaCO2 81.  He was subsequently intubated.      Mr. Garcia lives with his brother Luis A in [place].  He has an older brother who lives in [place], [place], and his mother lives in [place], [place]. He reported smokes cigarettes (1 ppd) with occasional alcohol use.  No reports of drug use. Mr. Garcia does not see a Pulmonologist  and family are not sure who prescribes his albuterol.          Hospital/ICU Course:  Patient intubated in the ED and admitted to the MICU service. Sedated and paralyzed for severe bronchoconstriction to prevent asynchrony.     Received frequent bronchodilators and had combined gap, non-gap, respiratory acidoses. Patient developed mild pneumomediastinum and subcutaneous emphysema (not palpable) on imaging. Rate and tidal volumes set and permissive hypercapnia established. Started on bicarb drip to improve metabolic acidosis. Bronchodilators reduced (salome albuterol) due to lactic acidosis which improved.     Overnight 2/26-2/27 patient had worsening bronchospasm while bronchodilators administered. Occurred again with inhaled steroids 2/27.     Wheezing and expiratory tracing markedly improved 2/28 and 3/1 and he was extubated on 3/1.     Interval History/Significant Events: Extubated yesterday. Mild hypoxia intermittently so was put on high flow. Weaned to NC and then off overnight.   No respiratory complaints this AM. Having godfrey-umbilical and generalized abdominal pain. Few bowel movements yesterday, none overnight.   Mother concerned that he intermittently is describing seeing a circular object on the ceiling, not worse at night. Patient denies hallucinations or changes in vision. Mother feels his behavior has been otherwise unremarkable.    Review of Systems   Constitutional: Negative for chills and fever.   HENT: Negative for trouble swallowing and voice change.    Eyes: Positive for visual disturbance. Negative for photophobia.   Respiratory: Negative for cough and shortness of breath.    Cardiovascular: Negative for chest pain, palpitations and leg swelling.   Gastrointestinal: Positive for abdominal pain and nausea. Negative for blood in stool, constipation, diarrhea and vomiting.   Genitourinary: Negative for dysuria and hematuria.   Musculoskeletal: Negative for arthralgias and myalgias.   Skin: Negative for  rash and wound.   Neurological: Negative for tremors, seizures, syncope, weakness, numbness and headaches.   Psychiatric/Behavioral: Positive for hallucinations. Negative for agitation and confusion.     Objective:     Vital Signs (Most Recent):  Temp: 98.3 °F (36.8 °C) (03/02/18 1100)  Pulse: 92 (03/02/18 1248)  Resp: 12 (03/02/18 1248)  BP: (!) 145/72 (03/02/18 0800)  SpO2: 95 % (03/02/18 1248) Vital Signs (24h Range):  Temp:  [98 °F (36.7 °C)-99.6 °F (37.6 °C)] 98.3 °F (36.8 °C)  Pulse:  [48-92] 92  Resp:  [12-30] 12  SpO2:  [90 %-99 %] 95 %  BP: (127-152)/(58-83) 145/72  Arterial Line BP: ()/(75-78) 98/75   Weight: 57.2 kg (126 lb 1.7 oz)  Body mass index is 20.35 kg/m².      Intake/Output Summary (Last 24 hours) at 03/02/18 1324  Last data filed at 03/02/18 1200   Gross per 24 hour   Intake              945 ml   Output             2670 ml   Net            -1725 ml       Physical Exam   Constitutional: He is oriented to person, place, and time. No distress. He is sedated and intubated.   HENT:   Head: Normocephalic and atraumatic.   Eyes: Conjunctivae and EOM are normal. Pupils are equal, round, and reactive to light. Right eye exhibits no discharge. Left eye exhibits no discharge. No scleral icterus. Right pupil not reactive: constricted bilaterally. Pupils are equal.   Cardiovascular: Normal rate, regular rhythm, normal heart sounds, intact distal pulses and normal pulses.    No murmur heard.  Warm extremities   Pulmonary/Chest: Effort normal and breath sounds normal. He is intubated. No respiratory distress. He has no wheezes (No inspiratory or expiratory wheezes this morning). He has no rales.   Abdominal: Soft. He exhibits distension. He exhibits no mass. There is no tenderness (                                                                                                                                                                            No significant abdominal tenderness). There is no  rebound and no guarding. No hernia.   Hyperactive bowel sounds with no tinkling or high pitched sounds   Genitourinary:   Genitourinary Comments: Urine catheter with clear, yellow drainage   Musculoskeletal: He exhibits no edema or deformity.   Neurological: He is alert and oriented to person, place, and time. He displays normal reflexes. No cranial nerve deficit or sensory deficit. He exhibits normal muscle tone. Coordination normal.   After sedation turned off, awake and alert. Following commands. Moving all extremities   Skin: Skin is warm and dry. He is not diaphoretic.   Psychiatric: He has a normal mood and affect. His behavior is normal. Judgment and thought content normal.   Nursing note and vitals reviewed.      Lines/Drains/Airways     Central Venous Catheter Line                 Trialysis (Dialysis) Catheter 02/26/18 2243 right internal jugular 3 days          Peripheral Intravenous Line                 Peripheral IV - Single Lumen 02/25/18 1606 Left Forearm 4 days         Peripheral IV - Single Lumen 03/01/18 0549 Right Wrist 1 day              Significant Labs:    CBC/Anemia Profile:    Recent Labs  Lab 03/01/18  0400 03/02/18  0314   WBC 15.44* 13.46*   HGB 11.0* 11.2*   HCT 33.9* 33.9*    181   MCV 94 93   RDW 12.7 12.3        Chemistries:    Recent Labs  Lab 03/01/18  0400 03/02/18  0314    141   K 4.4 3.4*    101   CO2 32* 29   BUN 24* 25*   CREATININE 0.7 0.9   CALCIUM 8.9 9.4   ALBUMIN 3.1*  3.1* 3.1*  3.1*   PROT 5.6* 5.8*   BILITOT 0.2 0.3   ALKPHOS 41* 39*   ALT 31 55*   AST 34 70*   MG 2.3 2.1   PHOS 2.2*  2.2* 2.8  2.8       Recent Lab Results       03/02/18  1232 03/02/18  0524 03/02/18  0314 03/01/18  2354 03/01/18  1815      Immature Granulocytes   0.7(H)       Immature Grans (Abs)   0.10  Comment:  Mild elevation in immature granulocytes is non specific and   can be seen in a variety of conditions including stress response,   acute inflammation, trauma and  pregnancy. Correlation with other   laboratory and clinical findings is essential.  (H)       Albumin   3.1(L)          3.1(L)       Alkaline Phosphatase   39(L)       ALT   55(H)       Anion Gap   11       AST   70(H)       Baso #   0.01       Basophil%   0.1       Bilirubin, Direct   0.2       Total Bilirubin   0.3  Comment:  For infants and newborns, interpretation of results should be based  on gestational age, weight and in agreement with clinical  observations.  Premature Infant recommended reference ranges:  Up to 24 hours.............<8.0 mg/dL  Up to 48 hours............<12.0 mg/dL  3-5 days..................<15.0 mg/dL  6-29 days.................<15.0 mg/dL         BUN, Bld   25(H)       Calcium   9.4       Chloride   101       CO2   29       Creatinine   0.9       Differential Method   Automated       eGFR if    >60.0       eGFR if non    >60.0  Comment:  Calculation used to obtain the estimated glomerular filtration  rate (eGFR) is the CKD-EPI equation.          Eos #   0.0       Eosinophil%   0.0       Glucose   107       Gran # (ANC)   12.0(H)       Gran%   88.9(H)       Hematocrit   33.9(L)       Hemoglobin   11.2(L)       Lymph #   0.7(L)       Lymph%   5.3(L)       Magnesium   2.1       MCH   30.8       MCHC   33.0       MCV   93       Mono #   0.7       Mono%   5.0       MPV   10.3       nRBC   0       Phosphorus   2.8          2.8       Platelets   181       POCT Glucose 92 110  105 84     Potassium   3.4(L)       Total Protein   5.8(L)       RBC   3.64(L)       RDW   12.3       Sodium   141       WBC   13.46(H)                   03/01/18  1407      Immature Granulocytes      Immature Grans (Abs)      Albumin      Alkaline Phosphatase      ALT      Anion Gap      AST      Baso #      Basophil%      Bilirubin, Direct      Total Bilirubin      BUN, Bld      Calcium      Chloride      CO2      Creatinine      Differential Method      eGFR if       eGFR  if non       Eos #      Eosinophil%      Glucose      Gran # (ANC)      Gran%      Hematocrit      Hemoglobin      Lymph #      Lymph%      Magnesium      MCH      MCHC      MCV      Mono #      Mono%      MPV      nRBC      Phosphorus      Platelets      POCT Glucose 96     Potassium      Total Protein      RBC      RDW      Sodium      WBC              Assessment/Plan:     Neuro   Encephalopathy, metabolic    Concern initially for hypoxic injury but no evidence of significant neurological dysfunction on exam. No metabolic derangements on lab which would cause encephalopathy.     Mother concerned about possible hallucinations overnight. Appears to wax and wane and in setting of critical illness in ICU on high dose steroids suspect mild delirium. Will monitor and work up further if clinically indicated.         Pulmonary   * Severe persistent asthma with status asthmaticus    --reportedly ran out of his albuterol, was requiring large quantities of nebulized treatment without relief at home the few days prior to admission.   --received continuous nebulizer for 1 hour, Magnesium, and Methylprednisolone 125 mg IV  -Intubated in ED  Active tobacco and marijuana smoker    Extubated yesterday, placed on high flow for hypoxia initially but weaned to room air overnight.   Vanc stopped yesterday.   Zosyn and azithro continued, will consider de-escalating.   Breathing treatments via MDI, NO NEBULIZERS  Started on Breo yesterday      On discharge:   1. Pulm Clinic Follow up ([facility]) in 4 weeks  2. 1 year supply of Breo at current dose        Acute respiratory failure with hypoxia and hypercapnia    As under severe persistent asthma        Oncology   Anemia    Has been stable. No evidence of bleeding and no hemolysis on labs.   Initially suspected dilutional 2/2 fluid resuscitation for lactic acidosis and may have had reduced production in setting of acute illness.   Will continue to monitor        GI    Generalized abdominal pain    Generalized with possible periumbilical localization  No significant tenderness on exam although patient reports mild pain with palpation. No other indications of significant pathology  Hyperactive bowel sounds, had been minimally active. Apparently had 6 BMs this morning per mother.   Will do serial abdominal exams today, stopping zosyn. Pepcid for possible gastric causes.   Will get lipase for pancreatitis, RUQ US in setting of mild transaminitis, C.diff and KUB for infectious diarrhea.            Critical Care Daily Checklist:    A: Awake: RASS Goal/Actual Goal:    Actual: Moran Agitation Sedation Scale (RASS): Alert and calm   B: Spontaneous Breathing Trial Performed?     C: SAT & SBT Coordinated?  N/A                      D: Delirium: CAM-ICU     E: Early Mobility Performed? PT/OT ordered this AM   F: Feeding Goal: Goals: Patient to receive nutrition by RD follow-up  Status: Nutrition Goal Status: goal met   Current Diet Order   Procedures    Diet Adult Regular (IDDSI Level 7)      AS: Analgesia/Sedation Tylenol, NSAID, Norco   T: Thromboembolic Prophylaxis Lovenox   H: HOB > 300 Yes   U: Stress Ulcer Prophylaxis (if needed) Famotidine   G: Glucose Control At goal   B: Bowel Function Stool Occurrence: 2   I: Indwelling Catheter (Lines & Wood) Necessity Right IJ, will pull today.    D: De-escalation of Antimicrobials/Pharmacotherapies Stopped pip pedro    Plan for the day/ETD Step down to Medicine    Code Status:  Family/Goals of Care: Full Code         Critical secondary to Patient has a condition that poses threat to life and bodily function: Severe Respiratory Distress      Critical care was time spent personally by me on the following activities: development of treatment plan with patient or surrogate and bedside caregivers, discussions with consultants, evaluation of patient's response to treatment, examination of patient, ordering and performing treatments and  interventions, ordering and review of laboratory studies, ordering and review of radiographic studies, pulse oximetry, re-evaluation of patient's condition. This critical care time did not overlap with that of any other provider or involve time for any procedures.     Zaki Holloway MD   Internal Medicine PGY-1  [phone]

## 2018-03-02 NOTE — ASSESSMENT & PLAN NOTE
Concern initially for hypoxic injury but no evidence of significant neurological dysfunction on exam. No metabolic derangements on lab which would cause encephalopathy.     Mother concerned about possible hallucinations overnight. Appears to wax and wane and in setting of critical illness in ICU on high dose steroids suspect mild delirium. Will monitor and work up further if clinically indicated.

## 2018-03-02 NOTE — PROGRESS NOTES
[facility]  Critical Care Medicine  Progress Note    Patient Name: Stanislaw Garcia  MRN: [id number]  Admission Date: 2/25/2018  Hospital Length of Stay: 5 days  Code Status: Full Code  Attending Provider: Bharath Everett MD  Primary Care Provider: Primary Doctor No   Principal Problem: Severe persistent asthma with status asthmaticus    Subjective:     HPI:  Mr. Garcia is a 26 y/o male with history of asthma since childhood who presented to the ED with severe asthma exacerbation with acute hypercapnic and hypoxemic respiratory failure. He reportedly ran out of his albuterol.  History obtained from brother, Luis A, and patient's mother.  Mr. Garcia only takes an albuterol inhaler for his asthma and was reportedly well controlled until December when he presented to the ED with asthma exacerbation.  He was treated in the ED and discharged home.  He reportedly improved following exacerbation; however, again worsened 1 month ago.  Patient's mother reports that he has not been feeling well for 3 days.  No reports of sick contacts. His brother found him at ~3 pm this afternoon in respiratory distress and unresponsive.  His brother reported that he was at his baseline complaining of difficulty breathing 1 hours prior to his brother finding him.   EMS called and reported Spo2 50% on arrival at which time they began providing assisted breaths with an ambu with subsequent improvement in Spo2 90's.  On arrival to ED, he was placed on BiPAP 10/15 with continuous nebs.  He received 125 mg solumedrol, 2 mg Magnesium.  His mental status worsened with somnolence and VBG showing pH 7.0, PaCO2 81.  He was subsequently intubated.      Mr. Garcia lives with his brother Luis A in [place].  He has an older brother who lives in [place], [place], and his mother lives in [place], [place]. He reported smokes cigarettes (1 ppd) with occasional alcohol use.  No reports of drug use. Mr. Garcia does not see a Pulmonologist  and family are not sure who prescribes his albuterol.          Hospital/ICU Course:  Patient intubated in the ED and admitted to the MICU service. Sedated and paralyzed for severe bronchoconstriction to prevent asynchrony.     Received frequent bronchodilators and had combined gap, non-gap, respiratory acidoses. Patient developed mild pneumomediastinum and subcutaneous emphysema (not palpable) on imaging. Rate and tidal volumes set and permissive hypercapnia established. Started on bicarb drip to improve metabolic acidosis. Bronchodilators reduced (salome albuterol) due to lactic acidosis which improved.     Overnight 2/26-2/27 patient had worsening bronchospasm while bronchodilators administered. Occurred again with inhaled steroids 2/27.     Wheezing and expiratory tracing markedly improved 2/28 and 3/1 and he was extubated on 3/1.     No new subjective & objective note has been filed under this hospital service since the last note was generated.    Assessment/Plan:     Neuro   Encephalopathy, metabolic    Concern initially for hypoxic injury but no evidence of significant neurological dysfunction on exam. No metabolic derangements on lab which would cause encephalopathy.     Mother concerned about possible hallucinations overnight. Appears to wax and wane and in setting of critical illness in ICU on high dose steroids suspect mild delirium. Will monitor and work up further if clinically indicated.         Pulmonary   * Severe persistent asthma with status asthmaticus    --reportedly ran out of his albuterol, was requiring large quantities of nebulized treatment without relief at home the few days prior to admission.   --received continuous nebulizer for 1 hour, Magnesium, and Methylprednisolone 125 mg IV  -Intubated in ED  Active tobacco and marijuana smoker    Extubated yesterday, placed on high flow for hypoxia initially but weaned to room air overnight.   Vanc stopped yesterday.   Zosyn and azithro continued,  will consider de-escalating.   Breathing treatments via MDI, NO NEBULIZERS  Started on Breo yesterday        Acute respiratory failure with hypoxia and hypercapnia    As under severe persistent asthma        Oncology   Anemia    Has been stable. No evidence of bleeding and no hemolysis on labs.   Initially suspected dilutional 2/2 fluid resuscitation for lactic acidosis and may have had reduced production in setting of acute illness.   Will continue to monitor        GI   Generalized abdominal pain    Generalized with possible periumbilical localization  No significant tenderness on exam although patient reports mild pain with palpation. No other indications of significant pathology  Hyperactive bowel sounds, had been minimally active. Apparently had 6 BMs this morning per mother.   Will do serial abdominal exams today, stopping zosyn. Pepcid for possible gastric causes.   Will get lipase for pancreatitis, RUQ US in setting of mild transaminitis, C.diff and KUB for infectious diarrhea.            Critical Care Daily Checklist:    A: Awake: RASS Goal/Actual Goal:    Actual: Moran Agitation Sedation Scale (RASS): Alert and calm   B: Spontaneous Breathing Trial Performed?     C: SAT & SBT Coordinated?  N/A                   D: Delirium: CAM-ICU     E: Early Mobility Performed? No   F: Feeding Goal: Goals: Patient to receive nutrition by RD follow-up  Status: Nutrition Goal Status: goal met   Current Diet Order   Procedures    Diet Adult Regular (IDDSI Level 7)      AS: Analgesia/Sedation Tylenol, NSAID   T: Thromboembolic Prophylaxis Lovenox   H: HOB > 300 Yes   U: Stress Ulcer Prophylaxis (if needed) Pepcid   G: Glucose Control At goal   B: Bowel Function Stool Occurrence: 2   I: Indwelling Catheter (Lines & Wood) Necessity Right IJ, will pull today   D: De-escalation of Antimicrobials/Pharmacotherapies Zosyn, Azithro, will consider de-escalation    Plan for the day/ETD Step down to floor    Code  Status:  Family/Goals of Care: Full Code         Critical secondary to Patient has a condition that poses threat to life and bodily function: Severe Respiratory Distress      Critical care was time spent personally by me on the following activities: development of treatment plan with patient or surrogate and bedside caregivers, discussions with consultants, evaluation of patient's response to treatment, examination of patient, ordering and performing treatments and interventions, ordering and review of laboratory studies, ordering and review of radiographic studies, pulse oximetry, re-evaluation of patient's condition. This critical care time did not overlap with that of any other provider or involve time for any procedures.     Zaki Holloway MD   Internal Medicine PGY-1  [phone]

## 2018-03-02 NOTE — SUBJECTIVE & OBJECTIVE
Interval History/Significant Events: Extubated yesterday. Mild hypoxia intermittently so was put on high flow. Weaned to NC and then off overnight.   No respiratory complaints this AM. Having godfrey-umbilical and generalized abdominal pain. Few bowel movements yesterday, none overnight.   Mother concerned that he intermittently is describing seeing a circular object on the ceiling, not worse at night. Patient denies hallucinations or changes in vision. Mother feels his behavior has been otherwise unremarkable.    Review of Systems   Constitutional: Negative for chills and fever.   HENT: Negative for trouble swallowing and voice change.    Eyes: Positive for visual disturbance. Negative for photophobia.   Respiratory: Negative for cough and shortness of breath.    Cardiovascular: Negative for chest pain, palpitations and leg swelling.   Gastrointestinal: Positive for abdominal pain and nausea. Negative for blood in stool, constipation, diarrhea and vomiting.   Genitourinary: Negative for dysuria and hematuria.   Musculoskeletal: Negative for arthralgias and myalgias.   Skin: Negative for rash and wound.   Neurological: Negative for tremors, seizures, syncope, weakness, numbness and headaches.   Psychiatric/Behavioral: Positive for hallucinations. Negative for agitation and confusion.     Objective:     Vital Signs (Most Recent):  Temp: 98.3 °F (36.8 °C) (03/02/18 1100)  Pulse: 92 (03/02/18 1248)  Resp: 12 (03/02/18 1248)  BP: (!) 145/72 (03/02/18 0800)  SpO2: 95 % (03/02/18 1248) Vital Signs (24h Range):  Temp:  [98 °F (36.7 °C)-99.6 °F (37.6 °C)] 98.3 °F (36.8 °C)  Pulse:  [48-92] 92  Resp:  [12-30] 12  SpO2:  [90 %-99 %] 95 %  BP: (127-152)/(58-83) 145/72  Arterial Line BP: ()/(75-78) 98/75   Weight: 57.2 kg (126 lb 1.7 oz)  Body mass index is 20.35 kg/m².      Intake/Output Summary (Last 24 hours) at 03/02/18 1324  Last data filed at 03/02/18 1200   Gross per 24 hour   Intake              945 ml   Output              2670 ml   Net            -1725 ml       Physical Exam   Constitutional: He is oriented to person, place, and time. No distress. He is sedated and intubated.   HENT:   Head: Normocephalic and atraumatic.   Eyes: Conjunctivae and EOM are normal. Pupils are equal, round, and reactive to light. Right eye exhibits no discharge. Left eye exhibits no discharge. No scleral icterus. Right pupil not reactive: constricted bilaterally. Pupils are equal.   Cardiovascular: Normal rate, regular rhythm, normal heart sounds, intact distal pulses and normal pulses.    No murmur heard.  Warm extremities   Pulmonary/Chest: Effort normal and breath sounds normal. He is intubated. No respiratory distress. He has no wheezes (No inspiratory or expiratory wheezes this morning). He has no rales.   Abdominal: Soft. He exhibits distension. He exhibits no mass. There is no tenderness (                                                                                                                                                                            No significant abdominal tenderness). There is no rebound and no guarding. No hernia.   Hyperactive bowel sounds with no tinkling or high pitched sounds   Genitourinary:   Genitourinary Comments: Urine catheter with clear, yellow drainage   Musculoskeletal: He exhibits no edema or deformity.   Neurological: He is alert and oriented to person, place, and time. He displays normal reflexes. No cranial nerve deficit or sensory deficit. He exhibits normal muscle tone. Coordination normal.   After sedation turned off, awake and alert. Following commands. Moving all extremities   Skin: Skin is warm and dry. He is not diaphoretic.   Psychiatric: He has a normal mood and affect. His behavior is normal. Judgment and thought content normal.   Nursing note and vitals reviewed.      Lines/Drains/Airways     Central Venous Catheter Line                 Trialysis (Dialysis) Catheter 02/26/18 2243 right  internal jugular 3 days          Peripheral Intravenous Line                 Peripheral IV - Single Lumen 02/25/18 1606 Left Forearm 4 days         Peripheral IV - Single Lumen 03/01/18 0549 Right Wrist 1 day              Significant Labs:    CBC/Anemia Profile:    Recent Labs  Lab 03/01/18  0400 03/02/18  0314   WBC 15.44* 13.46*   HGB 11.0* 11.2*   HCT 33.9* 33.9*    181   MCV 94 93   RDW 12.7 12.3        Chemistries:    Recent Labs  Lab 03/01/18  0400 03/02/18  0314    141   K 4.4 3.4*    101   CO2 32* 29   BUN 24* 25*   CREATININE 0.7 0.9   CALCIUM 8.9 9.4   ALBUMIN 3.1*  3.1* 3.1*  3.1*   PROT 5.6* 5.8*   BILITOT 0.2 0.3   ALKPHOS 41* 39*   ALT 31 55*   AST 34 70*   MG 2.3 2.1   PHOS 2.2*  2.2* 2.8  2.8       Recent Lab Results       03/02/18  1232 03/02/18  0524 03/02/18  0314 03/01/18  2354 03/01/18  1815      Immature Granulocytes   0.7(H)       Immature Grans (Abs)   0.10  Comment:  Mild elevation in immature granulocytes is non specific and   can be seen in a variety of conditions including stress response,   acute inflammation, trauma and pregnancy. Correlation with other   laboratory and clinical findings is essential.  (H)       Albumin   3.1(L)          3.1(L)       Alkaline Phosphatase   39(L)       ALT   55(H)       Anion Gap   11       AST   70(H)       Baso #   0.01       Basophil%   0.1       Bilirubin, Direct   0.2       Total Bilirubin   0.3  Comment:  For infants and newborns, interpretation of results should be based  on gestational age, weight and in agreement with clinical  observations.  Premature Infant recommended reference ranges:  Up to 24 hours.............<8.0 mg/dL  Up to 48 hours............<12.0 mg/dL  3-5 days..................<15.0 mg/dL  6-29 days.................<15.0 mg/dL         BUN, Bld   25(H)       Calcium   9.4       Chloride   101       CO2   29       Creatinine   0.9       Differential Method   Automated       eGFR if    >60.0        eGFR if non    >60.0  Comment:  Calculation used to obtain the estimated glomerular filtration  rate (eGFR) is the CKD-EPI equation.          Eos #   0.0       Eosinophil%   0.0       Glucose   107       Gran # (ANC)   12.0(H)       Gran%   88.9(H)       Hematocrit   33.9(L)       Hemoglobin   11.2(L)       Lymph #   0.7(L)       Lymph%   5.3(L)       Magnesium   2.1       MCH   30.8       MCHC   33.0       MCV   93       Mono #   0.7       Mono%   5.0       MPV   10.3       nRBC   0       Phosphorus   2.8          2.8       Platelets   181       POCT Glucose 92 110  105 84     Potassium   3.4(L)       Total Protein   5.8(L)       RBC   3.64(L)       RDW   12.3       Sodium   141       WBC   13.46(H)                   03/01/18  1407      Immature Granulocytes      Immature Grans (Abs)      Albumin      Alkaline Phosphatase      ALT      Anion Gap      AST      Baso #      Basophil%      Bilirubin, Direct      Total Bilirubin      BUN, Bld      Calcium      Chloride      CO2      Creatinine      Differential Method      eGFR if       eGFR if non       Eos #      Eosinophil%      Glucose      Gran # (ANC)      Gran%      Hematocrit      Hemoglobin      Lymph #      Lymph%      Magnesium      MCH      MCHC      MCV      Mono #      Mono%      MPV      nRBC      Phosphorus      Platelets      POCT Glucose 96     Potassium      Total Protein      RBC      RDW      Sodium      WBC

## 2018-03-02 NOTE — PLAN OF CARE
Problem: Patient Care Overview  Goal: Individualization & Mutuality  Dx: Respiratory distress  PMH: asthma, smoker 1ppd, marijuana use    2/25: found cyanotic at home by family- arrived to ED via EMS; 125 solumedrol, 2 g mag sulfate, intubated in ED and arterial line placed, propofol, fentanyl, blood cultures, CXR, KUB, CT head w/o contrast- transferred to SICU, paralyzed, 1 L NaCl bolus   2/26: Right IJ trialysis cath placed. Desat with tachycardia and htn episodes x3 related to albuterol neb treatments. D/c albuterol   2/27: Nebulizers d/c, mdi treatments initiated. Pt O2 sat dropped with no correlation to treatment. Bicarb, theophylline gtt initiated  2/28:Bicarb gtt stopped, vent weaned, no bronchospastic episodes. Nimbex d/c. Tube feedings started.       Nursing:  -SBP   -POCT ACHS          Outcome: Ongoing (interventions implemented as appropriate)  POC reviewed w pt and mother. VSS. Oxygenating well on 2l nc. Denies pain. Tolerating regular diet well. Accu checks q6h. All questions and concerns addressed. WCTM

## 2018-03-02 NOTE — SUBJECTIVE & OBJECTIVE
Interval History/Significant Events: Extubated yesterday. Mild hypoxia intermittently so was put on high flow. Weaned to NC and then off overnight.   No respiratory complaints this AM. Having godfrey-umbilical and generalized abdominal pain. Few bowel movements yesterday, none overnight.   Mother concerned that he intermittently is describing seeing a circular object on the ceiling, not worse at night. Patient denies hallucinations or changes in vision. Mother feels his behavior has been otherwise unremarkable.    Review of Systems   Unable to perform ROS: Intubated   Constitutional: Negative for chills and fever.   HENT: Negative for trouble swallowing and voice change.    Eyes: Positive for visual disturbance. Negative for photophobia.   Respiratory: Negative for cough and shortness of breath.    Cardiovascular: Negative for chest pain, palpitations and leg swelling.   Gastrointestinal: Positive for abdominal pain and nausea. Negative for blood in stool, constipation, diarrhea and vomiting.   Genitourinary: Negative for dysuria and hematuria.   Musculoskeletal: Negative for arthralgias and myalgias.   Skin: Negative for rash and wound.   Neurological: Negative for tremors, seizures, syncope, weakness, numbness and headaches.   Psychiatric/Behavioral: Positive for hallucinations. Negative for agitation and confusion.     Objective:     Vital Signs (Most Recent):  Temp: 98.5 °F (36.9 °C) (03/02/18 0700)  Pulse: 65 (03/02/18 0845)  Resp: (!) 26 (03/02/18 0845)  BP: (!) 145/72 (03/02/18 0800)  SpO2: (!) 92 % (03/02/18 0845) Vital Signs (24h Range):  Temp:  [98 °F (36.7 °C)-99.6 °F (37.6 °C)] 98.5 °F (36.9 °C)  Pulse:  [] 65  Resp:  [12-35] 26  SpO2:  [90 %-99 %] 92 %  BP: (127-152)/(58-83) 145/72  Arterial Line BP: ()/(71-89) 98/75   Weight: 57.2 kg (126 lb 1.7 oz)  Body mass index is 20.35 kg/m².      Intake/Output Summary (Last 24 hours) at 03/02/18 0948  Last data filed at 03/02/18 0800   Gross per 24 hour    Intake             1045 ml   Output             1870 ml   Net             -825 ml       Physical Exam   Constitutional: He is oriented to person, place, and time. No distress. He is sedated and intubated.   HENT:   Head: Normocephalic and atraumatic.   Eyes: Conjunctivae and EOM are normal. Pupils are equal, round, and reactive to light. Right eye exhibits no discharge. Left eye exhibits no discharge. No scleral icterus. Right pupil not reactive: constricted bilaterally. Pupils are equal.   Cardiovascular: Normal rate, regular rhythm, normal heart sounds, intact distal pulses and normal pulses.    No murmur heard.  Warm extremities   Pulmonary/Chest: Effort normal and breath sounds normal. He is intubated. No respiratory distress. He has no wheezes (No inspiratory or expiratory wheezes this morning). He has no rales.   Abdominal: Soft. He exhibits distension. He exhibits no mass. There is no tenderness (                                                                                                                                                                            No significant abdominal tenderness). There is no rebound and no guarding. No hernia.   Hyperactive bowel sounds with no tinkling or high pitched sounds   Genitourinary:   Genitourinary Comments: Urine catheter with clear, yellow drainage   Musculoskeletal: He exhibits no edema or deformity.   Neurological: He is alert and oriented to person, place, and time. He displays normal reflexes. No cranial nerve deficit or sensory deficit. He exhibits normal muscle tone. Coordination normal.   After sedation turned off, awake and alert. Following commands. Moving all extremities   Skin: Skin is warm and dry. He is not diaphoretic.   Psychiatric: He has a normal mood and affect. His behavior is normal. Judgment and thought content normal.   Nursing note and vitals reviewed.      Lines/Drains/Airways     Central Venous Catheter Line                  Trialysis (Dialysis) Catheter 02/26/18 2243 right internal jugular 3 days          Peripheral Intravenous Line                 Peripheral IV - Single Lumen 02/25/18 1606 Left Forearm 4 days         Peripheral IV - Single Lumen 03/01/18 0549 Right Wrist 1 day              Significant Labs:    CBC/Anemia Profile:    Recent Labs  Lab 03/01/18  0400 03/02/18  0314   WBC 15.44* 13.46*   HGB 11.0* 11.2*   HCT 33.9* 33.9*    181   MCV 94 93   RDW 12.7 12.3        Chemistries:    Recent Labs  Lab 03/01/18  0400 03/02/18  0314    141   K 4.4 3.4*    101   CO2 32* 29   BUN 24* 25*   CREATININE 0.7 0.9   CALCIUM 8.9 9.4   ALBUMIN 3.1*  3.1* 3.1*  3.1*   PROT 5.6* 5.8*   BILITOT 0.2 0.3   ALKPHOS 41* 39*   ALT 31 55*   AST 34 70*   MG 2.3 2.1   PHOS 2.2*  2.2* 2.8  2.8       Recent Lab Results       03/02/18  0524 03/02/18  0314 03/01/18  2354 03/01/18  1815 03/01/18  1407      Immature Granulocytes  0.7(H)        Immature Grans (Abs)  0.10  Comment:  Mild elevation in immature granulocytes is non specific and   can be seen in a variety of conditions including stress response,   acute inflammation, trauma and pregnancy. Correlation with other   laboratory and clinical findings is essential.  (H)        Albumin  3.1(L)          3.1(L)        Alkaline Phosphatase  39(L)        ALT  55(H)        Anion Gap  11        AST  70(H)        Baso #  0.01        Basophil%  0.1        Bilirubin, Direct  0.2        Total Bilirubin  0.3  Comment:  For infants and newborns, interpretation of results should be based  on gestational age, weight and in agreement with clinical  observations.  Premature Infant recommended reference ranges:  Up to 24 hours.............<8.0 mg/dL  Up to 48 hours............<12.0 mg/dL  3-5 days..................<15.0 mg/dL  6-29 days.................<15.0 mg/dL          BUN, Bld  25(H)        Calcium  9.4        Chloride  101        CO2  29        Creatinine  0.9        Differential Method   Automated        eGFR if   >60.0        eGFR if non   >60.0  Comment:  Calculation used to obtain the estimated glomerular filtration  rate (eGFR) is the CKD-EPI equation.           Eos #  0.0        Eosinophil%  0.0        Glucose  107        Gran # (ANC)  12.0(H)        Gran%  88.9(H)        Hematocrit  33.9(L)        Hemoglobin  11.2(L)        Lymph #  0.7(L)        Lymph%  5.3(L)        Magnesium  2.1        MCH  30.8        MCHC  33.0        MCV  93        Mono #  0.7        Mono%  5.0        MPV  10.3        nRBC  0        Phosphorus  2.8          2.8        Platelets  181        POCT Glucose 110  105 84 96     Potassium  3.4(L)        Total Protein  5.8(L)        RBC  3.64(L)        RDW  12.3        Sodium  141        WBC  13.46(H)

## 2018-03-02 NOTE — PT/OT/SLP PROGRESS
Physical Therapy      Patient Name:  Stanislaw Garcia   MRN:  [id number]    Patient not seen today. Pt in the process of being stepped down to general medicine floor. Will follow-up when appropriate.      Cecy Mtz, PT, DPT  3/2/2018  [phone]

## 2018-03-02 NOTE — ASSESSMENT & PLAN NOTE
--reportedly ran out of his albuterol, was requiring large quantities of nebulized treatment without relief at home the few days prior to admission.   --received continuous nebulizer for 1 hour, Magnesium, and Methylprednisolone 125 mg IV  -Intubated in ED  Active tobacco and marijuana smoker    Extubated yesterday, placed on high flow for hypoxia initially but weaned to room air overnight.   Vanc stopped yesterday.   Zosyn and azithro continued, will consider de-escalating.   Breathing treatments via MDI, NO NEBULIZERS  Started on Breo yesterday

## 2018-03-02 NOTE — ASSESSMENT & PLAN NOTE
Nutrition Problem  Inadequate energy intake    Related to (etiology):   Decreased ability to consume sufficient energy    Signs and Symptoms (as evidenced by):   NPO and meeting < 85% EEN and EPN    Nutrition Diagnosis Status:   Improving

## 2018-03-02 NOTE — PLAN OF CARE
Patient extubated.  Plan for PT/OT eval and transfer from ICU.  CM will continue to follow.       03/02/18 1410   Right Care Assessment   Can the patient answer the patient profile reliably? Yes, cognitively intact   How often would a person be available to care for the patient? Whenever needed   Describe the patient's ability to walk at the present time. Walks with the help of equipment   How does the patient rate their overall health at the present time? Fair   Number of comorbid conditions (as recorded on the chart) One   During the past month, has the patient often been bothered by feeling down, depressed or hopeless? No   During the past month, has the patient often been bothered by little interest or pleasure in doing things? No   Donna Delgado RN, BSN  Case Management  [facility]  Ext. [phone]

## 2018-03-02 NOTE — ASSESSMENT & PLAN NOTE
Has been stable. No evidence of bleeding and no hemolysis on labs.   Initially suspected dilutional 2/2 fluid resuscitation for lactic acidosis and may have had reduced production in setting of acute illness.   Will continue to monitor

## 2018-03-02 NOTE — PLAN OF CARE
Problem: Patient Care Overview  Goal: Plan of Care Review  Outcome: Ongoing (interventions implemented as appropriate)  Dx: Respiratory distress  Shift Events: Extubated, DC starks, DC daniela  Neuro: AAOx4, Follows commands, moves all extremities  Vital Signs: Pt. On comfort flow currently, MAP maintained above 65  Intake: Pt. Passed swallow study, Gtts: Theophylline  Output: [place] DC  Pain Management: Pt. Denies pain   Labs: Accuchecks q6h (no coverage needed)  Skin: No redness or breakdown noted on sacrum, heels, or elbows. Pt. Changes position independently.

## 2018-03-02 NOTE — ASSESSMENT & PLAN NOTE
Generalized with possible periumbilical localization  No significant tenderness on exam although patient reports mild pain with palpation. No other indications of significant pathology  Hyperactive bowel sounds, had been minimally active. Apparently had 6 BMs this morning per mother.   Will do serial abdominal exams today, stopping zosyn. Pepcid for possible gastric causes.   Will get lipase for pancreatitis, RUQ US in setting of mild transaminitis, C.diff and KUB for infectious diarrhea.

## 2018-03-02 NOTE — ASSESSMENT & PLAN NOTE
--reportedly ran out of his albuterol, was requiring large quantities of nebulized treatment without relief at home the few days prior to admission.   --received continuous nebulizer for 1 hour, Magnesium, and Methylprednisolone 125 mg IV  -Intubated in ED  Active tobacco and marijuana smoker    Extubated yesterday, placed on high flow for hypoxia initially but weaned to room air overnight.   Vanc stopped yesterday.   Zosyn and azithro continued, will consider de-escalating.   Breathing treatments via MDI, NO NEBULIZERS  Started on Breo yesterday      On discharge:   1. Pulm Clinic Follow up ([facility]) in 4 weeks  2. 1 year supply of Breo at current dose

## 2018-03-03 PROBLEM — J96.90 RESPIRATORY FAILURE: Status: RESOLVED | Noted: 2018-03-02 | Resolved: 2018-03-03

## 2018-03-03 PROBLEM — D64.9 ANEMIA: Status: RESOLVED | Noted: 2018-02-27 | Resolved: 2018-03-03

## 2018-03-03 PROBLEM — R74.01 TRANSAMINITIS: Status: ACTIVE | Noted: 2018-03-03

## 2018-03-03 PROBLEM — R10.84 GENERALIZED ABDOMINAL PAIN: Status: RESOLVED | Noted: 2018-03-02 | Resolved: 2018-03-03

## 2018-03-03 LAB
ALBUMIN SERPL BCP-MCNC: 3.5 G/DL
ALBUMIN SERPL BCP-MCNC: 3.6 G/DL
ALP SERPL-CCNC: 50 U/L
ALT SERPL W/O P-5'-P-CCNC: 60 U/L
ANION GAP SERPL CALC-SCNC: 14 MMOL/L
ANISOCYTOSIS BLD QL SMEAR: SLIGHT
AST SERPL-CCNC: 48 U/L
BASOPHILS # BLD AUTO: 0.03 K/UL
BASOPHILS NFR BLD: 0.2 %
BILIRUB DIRECT SERPL-MCNC: 0.3 MG/DL
BILIRUB SERPL-MCNC: 0.7 MG/DL
BUN SERPL-MCNC: 26 MG/DL
CALCIUM SERPL-MCNC: 9.6 MG/DL
CHLORIDE SERPL-SCNC: 103 MMOL/L
CO2 SERPL-SCNC: 23 MMOL/L
CREAT SERPL-MCNC: 0.9 MG/DL
DIFFERENTIAL METHOD: ABNORMAL
EOSINOPHIL # BLD AUTO: 0 K/UL
EOSINOPHIL NFR BLD: 0.1 %
ERYTHROCYTE [DISTWIDTH] IN BLOOD BY AUTOMATED COUNT: 11.9 %
EST. GFR  (AFRICAN AMERICAN): >60 ML/MIN/1.73 M^2
EST. GFR  (NON AFRICAN AMERICAN): >60 ML/MIN/1.73 M^2
GLUCOSE SERPL-MCNC: 85 MG/DL
HCT VFR BLD AUTO: 39.6 %
HGB BLD-MCNC: 13.8 G/DL
IMM GRANULOCYTES # BLD AUTO: 0.1 K/UL
IMM GRANULOCYTES NFR BLD AUTO: 0.7 %
LYMPHOCYTES # BLD AUTO: 2.3 K/UL
LYMPHOCYTES NFR BLD: 16 %
MAGNESIUM SERPL-MCNC: 2.3 MG/DL
MCH RBC QN AUTO: 30.3 PG
MCHC RBC AUTO-ENTMCNC: 34.8 G/DL
MCV RBC AUTO: 87 FL
MONOCYTES # BLD AUTO: 2.1 K/UL
MONOCYTES NFR BLD: 15.1 %
NEUTROPHILS # BLD AUTO: 9.6 K/UL
NEUTROPHILS NFR BLD: 67.9 %
NRBC BLD-RTO: 0 /100 WBC
PHOSPHATE SERPL-MCNC: 3.4 MG/DL
PHOSPHATE SERPL-MCNC: 3.4 MG/DL
PLATELET # BLD AUTO: 192 K/UL
PLATELET BLD QL SMEAR: ABNORMAL
PMV BLD AUTO: 10.6 FL
POCT GLUCOSE: 113 MG/DL (ref 70–110)
POCT GLUCOSE: 83 MG/DL (ref 70–110)
POCT GLUCOSE: 99 MG/DL (ref 70–110)
POTASSIUM SERPL-SCNC: 3.1 MMOL/L
PROT SERPL-MCNC: 6.9 G/DL
RBC # BLD AUTO: 4.55 M/UL
SODIUM SERPL-SCNC: 140 MMOL/L
WBC # BLD AUTO: 14.13 K/UL

## 2018-03-03 PROCEDURE — 85025 COMPLETE CBC W/AUTO DIFF WBC: CPT

## 2018-03-03 PROCEDURE — 97161 PT EVAL LOW COMPLEX 20 MIN: CPT

## 2018-03-03 PROCEDURE — 97535 SELF CARE MNGMENT TRAINING: CPT

## 2018-03-03 PROCEDURE — 83735 ASSAY OF MAGNESIUM: CPT

## 2018-03-03 PROCEDURE — 25000003 PHARM REV CODE 250: Performed by: NURSE PRACTITIONER

## 2018-03-03 PROCEDURE — 63600175 PHARM REV CODE 636 W HCPCS: Performed by: STUDENT IN AN ORGANIZED HEALTH CARE EDUCATION/TRAINING PROGRAM

## 2018-03-03 PROCEDURE — A4216 STERILE WATER/SALINE, 10 ML: HCPCS | Performed by: NURSE PRACTITIONER

## 2018-03-03 PROCEDURE — 25000003 PHARM REV CODE 250: Performed by: STUDENT IN AN ORGANIZED HEALTH CARE EDUCATION/TRAINING PROGRAM

## 2018-03-03 PROCEDURE — 80069 RENAL FUNCTION PANEL: CPT

## 2018-03-03 PROCEDURE — 63600175 PHARM REV CODE 636 W HCPCS: Performed by: INTERNAL MEDICINE

## 2018-03-03 PROCEDURE — 99232 SBSQ HOSP IP/OBS MODERATE 35: CPT | Mod: ,,, | Performed by: HOSPITALIST

## 2018-03-03 PROCEDURE — 11000001 HC ACUTE MED/SURG PRIVATE ROOM

## 2018-03-03 PROCEDURE — 80076 HEPATIC FUNCTION PANEL: CPT

## 2018-03-03 PROCEDURE — 97165 OT EVAL LOW COMPLEX 30 MIN: CPT

## 2018-03-03 PROCEDURE — 84100 ASSAY OF PHOSPHORUS: CPT

## 2018-03-03 RX ADMIN — ENOXAPARIN SODIUM 40 MG: 100 INJECTION SUBCUTANEOUS at 05:03

## 2018-03-03 RX ADMIN — PREDNISONE 40 MG: 20 TABLET ORAL at 09:03

## 2018-03-03 RX ADMIN — ALBUTEROL SULFATE 2 PUFF: 90 AEROSOL, METERED RESPIRATORY (INHALATION) at 05:03

## 2018-03-03 RX ADMIN — ALBUTEROL SULFATE 2 PUFF: 90 AEROSOL, METERED RESPIRATORY (INHALATION) at 09:03

## 2018-03-03 RX ADMIN — FLUTICASONE FUROATE AND VILANTEROL TRIFENATATE 1 PUFF: 200; 25 POWDER RESPIRATORY (INHALATION) at 09:03

## 2018-03-03 RX ADMIN — ALBUTEROL SULFATE 2 PUFF: 90 AEROSOL, METERED RESPIRATORY (INHALATION) at 08:03

## 2018-03-03 RX ADMIN — ALBUTEROL SULFATE 2 PUFF: 90 AEROSOL, METERED RESPIRATORY (INHALATION) at 01:03

## 2018-03-03 RX ADMIN — FAMOTIDINE 20 MG: 20 TABLET, FILM COATED ORAL at 09:03

## 2018-03-03 RX ADMIN — Medication 3 ML: at 10:03

## 2018-03-03 RX ADMIN — Medication 3 ML: at 05:03

## 2018-03-03 RX ADMIN — Medication 3 ML: at 01:03

## 2018-03-03 NOTE — PLAN OF CARE
Problem: Physical Therapy Goal  Goal: Physical Therapy Goal  Goals to be met by: 3/13/18    Patient will increase functional independence with mobility by performin. Sit to stand transfer with Modified [place]  2. Bed to chair transfer with Modified [place]  3. Gait  x 150 feet with Supervision  4. Ascend/descend 10 stair with bilateral Handrails Modified [place]    Outcome: Ongoing (interventions implemented as appropriate)  Evaluation complete.  Goals established to improve functional mobility.    DC rec's for Home no PT needs anticipated.     Bogdan Duffy III, DPT, PT  3/3/2018

## 2018-03-03 NOTE — PT/OT/SLP EVAL
Physical Therapy Evaluation    Patient Name:  Stanislaw Garcia   MRN:  [id number]    Recommendations:     Discharge Recommendations:   (home no anticipated PT needs)   Discharge Equipment Recommendations: none   Barriers to discharge: Inaccessible home    Assessment:     Stanislaw Garcia is a 27 y.o. male admitted with a medical diagnosis of Severe persistent asthma with status asthmaticus.  He presents with the following impairments/functional limitations:  impaired endurance, gait instability, impaired balance. Fair tolerance to PT session. Gait tolerance limited secondary to dizziness and impaired endurance. VSS throughout session. To benefit from continued skilled intervention during acute stay. Anticipated recs for home without skilled PT intervention upon discharge with progression with acute PT services.    Rehab Prognosis:  Good; patient would benefit from acute skilled PT services to address these deficits and reach maximum level of function.      Recent Surgery: * No surgery found *      Plan:     During this hospitalization, patient to be seen 3 x/week to address the above listed problems via gait training, therapeutic activities, therapeutic exercises  · Plan of Care Expires:  04/03/18   Plan of Care Reviewed with: patient    Subjective     Communicated with RN prior to session.  Patient found supine upon PT entry to room, agreeable to evaluation.      Chief Complaint: dizziness  Patient comments/goals: none stated  Pain/Comfort:  · Pain Rating 1: 0/10    Patients cultural, spiritual, Voodoo conflicts given the current situation: none stated    Living Environment:  Patient lives alone in in 2nd floor apartment with 10 [address]s. Regular tub/shower. 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  Patient has the following equipment: none.  DME owned (not currently used): none.  Upon discharge, patient will have limited  assistance.    Objective:     Patient found with:  (no lines)     General Precautions: Standard, fall   Orthopedic Precautions:N/A   Braces: N/A     Exams:  · Cognitive Exam:  Patient is oriented to Person, Place, Time and Situation and follows 100% of multi-step commands   · Gross Motor Coordination:  WFL  · Sensation: -       Intact  light/touch BLE  · Skin Integrity/Edema:  -       Skin integrity: Visible skin intact  · RLE ROM: WFL  · RLE Strength: WFL  · LLE ROM: WFL  · LLE Strength: WFL    Functional Mobility:  · Bed Mobility:  Rolling Left:  modified independence  · Scooting: modified independence  · Supine to Sit: modified independence  · Sit to Supine: modified independence  · Transfers:  Sit to Stand:  supervision with no AD  · Gait: 45ftx2 with seated rest break and CGA without device Mild Instability noted with ambulation; however, able to self-correct with SBA/CGA      AM-PAC 6 CLICK MOBILITY  Total Score:20       Therapeutic Activities and Exercises:  OT present for co-evaluation.     Patient educated on:   - role of PT/POC    - safety with all functional mobility   - bed mobility training   - transfer training   - gait training without device   - deep breathing techniques   - calling for assist for mobility in room   - importance of participation with therapy services   - safest to ambulate with 1 person assist at this time.    Verbalized understanding of all education provided.    BP seated EOB: 126/82  BP after immediate stand: 125/84  Taken from L arm.    Patient left supine with all lines intact, call button in reach and RN notified.    GOALS:    Physical Therapy Goals        Problem: Physical Therapy Goal    Goal Priority Disciplines Outcome Goal Variances Interventions   Physical Therapy Goal     PT/OT, PT Ongoing (interventions implemented as appropriate)     Description:  Goals to be met by: 3/13/18    Patient will increase functional independence with mobility by performin. Sit to  stand transfer with Modified [place]  2. Bed to chair transfer with Modified [place]  3. Gait  x 150 feet with Supervision  4. Ascend/descend 10 stair with bilateral Handrails Modified [place]                      History:     Past Medical History:   Diagnosis Date    Asthma        No past surgical history on file.    Clinical Decision Making:     History  Co-morbidities and personal factors that may impact the plan of care Examination  Body Structures and Functions, activity limitations and participation restrictions that may impact the plan of care Clinical Presentation   Decision Making/ Complexity Score   Co-morbidities:   [x] Time since onset of injury / illness / exacerbation  [x] Status of current condition  []Patient's cognitive status and safety concerns    [] Multiple Medical Problems (see med hx)  Personal Factors:   [] Patient's age  [] Prior Level of function   [x] Patient's home situation (environment and family support)  [] Patient's level of motivation  [] Expected progression of patient      HISTORY:(criteria)    [] 09124 - no personal factors/history    [x] 76857 - has 1-2 personal factor/comorbidity     [] 40257 - has >3 personal factor/comorbidity     Body Regions:  [] Objective examination findings  [] Head     []  Neck  [] Trunk   [] Upper Extremity  [] Lower Extremity    Body Systems:  [] For communication ability, affect, cognition, language, and learning style: the assessment of the ability to make needs known, consciousness, orientation (person, place, and time), expected emotional /behavioral responses, and learning preferences (eg, learning barriers, education  needs)  [] For the neuromuscular system: a general assessment of gross coordinated movement (eg, balance, gait, locomotion, transfers, and transitions) and motor function  (motor control and motor learning)  [] For the musculoskeletal system: the assessment of gross symmetry, gross range of motion, gross strength,  height, and weight  [] For the integumentary system: the assessment of pliability(texture), presence of scar formation, skin color, and skin integrity  [x] For cardiovascular/pulmonary system: the assessment of heart rate, respiratory rate, blood pressure, and edema     Activity limitations:    [] Patient's cognitive status and saf ety concerns          [] Status of current condition      [] Weight bearing restriction  [] Cardiopulmunary Restriction    Participation Restrictions:   [] Goals and goal agreement with the patient     [] Rehab potential (prognosis) and probable outcome      Examination of Body System: (criteria)    [x] 32844 - addressing 1-2 elements    [] 92975 - addressing a total of 3 or more elements     [] 60738 -  Addressing a total of 4 or more elements         Clinical Presentation: (criteria)  Stable - 79680     On examination of body system using standardized tests and measures patient presents with 1-2 elements from any of the following: body structures and functions, activity limitations, and/or participation restrictions.  Leading to a clinical presentation that is considered stable and/or uncomplicated                              Clinical Decision Making  (Eval Complexity):  Low- 22897     Time Tracking:     PT Received On: 03/03/18  PT Start Time: 0816     PT Stop Time: 0831  PT Total Time (min): 15 min     Billable Minutes: Evaluation 15      Bogdan Duffy III, PT  03/03/2018

## 2018-03-03 NOTE — HPI
Stanislaw Garcia is a 27 y.o. male with history of asthma since childhood who presented to the ED with severe asthma exacerbation with acute hypercapnic and hypoxemic respiratory failure. He reportedly ran out of his albuterol.  History obtained from brother, Luis A, and patient's mother.  Mr. Garcia only takes an albuterol inhaler for his asthma and was reportedly well controlled until December when he presented to the ED with asthma exacerbation.  He was treated in the ED and discharged home.  He reportedly improved following exacerbation; however, again worsened 1 month ago.  Patient's mother reports that he has not been feeling well for 3 days.  No reports of sick contacts. His brother found him at ~3 pm this afternoon in respiratory distress and unresponsive.  His brother reported that he was at his baseline complaining of difficulty breathing 1 hours prior to his brother finding him.   EMS called and reported Spo2 50% on arrival at which time they began providing assisted breaths with an ambu with subsequent improvement in Spo2 90's.  On arrival to ED, he was placed on BiPAP 10/15 with continuous nebs.  He received 125 mg solumedrol, 2 mg Magnesium.  His mental status worsened with somnolence and VBG showing pH 7.0, PaCO2 81.  He was subsequently intubated.

## 2018-03-03 NOTE — PLAN OF CARE
Problem: Patient Care Overview  Goal: Plan of Care Review  Outcome: Ongoing (interventions implemented as appropriate)  POC reviewed with pt and mother. VSS. AAOx4. Slight fever at night  F. Denies SOB, chills, diaphoresis, LOC, pain. Pt still refused to eat, but stated the urge and craving to eat has returned. Pt progressing towards goals. No acute events. Will continue to monitor and reassess.     Problem: Infection, Risk/Actual (Adult)  Goal: Identify Related Risk Factors and Signs and Symptoms  Related risk factors and signs and symptoms are identified upon initiation of Human Response Clinical Practice Guideline (CPG)   Outcome: Ongoing (interventions implemented as appropriate)  Aseptic technique utilized.    Problem: Fall Risk (Adult)  Goal: Identify Related Risk Factors and Signs and Symptoms  Related risk factors and signs and symptoms are identified upon initiation of Human Response Clinical Practice Guideline (CPG)   Outcome: Ongoing (interventions implemented as appropriate)  Remained free of fall/trauma/injury. Call bell, commode and personal belongings at the bedside.     Problem: Pressure Ulcer Risk (Lm Scale) (Adult,Obstetrics,Pediatric)  Goal: Identify Related Risk Factors and Signs and Symptoms  Related risk factors and signs and symptoms are identified upon initiation of Human Response Clinical Practice Guideline (CPG)   Outcome: Ongoing (interventions implemented as appropriate)  Skin remained intact. Weight shifting done independently.

## 2018-03-03 NOTE — PROGRESS NOTES
[facility] Hospital Medicine  Progress Note    Patient Name: Stanislaw Garcia  MRN: [id number]  Patient Class: IP- Inpatient   Admission Date: 2/25/2018  Length of Stay: 6 days  Attending Physician: Eunice Hu MD  Primary Care Provider: Primary Doctor [facility] Medicine Team: [facility] HOSP MED 4 Chintan Hernandez MD    Subjective:     Principal Problem:Severe persistent asthma with status asthmaticus    HPI:  Stanislaw Garcia is a 27 y.o. male with history of asthma since childhood who presented to the ED with severe asthma exacerbation with acute hypercapnic and hypoxemic respiratory failure. He reportedly ran out of his albuterol.  History obtained from brother, Luis A, and patient's mother.  Mr. Garcia only takes an albuterol inhaler for his asthma and was reportedly well controlled until December when he presented to the ED with asthma exacerbation.  He was treated in the ED and discharged home.  He reportedly improved following exacerbation; however, again worsened 1 month ago.  Patient's mother reports that he has not been feeling well for 3 days.  No reports of sick contacts. His brother found him at ~3 pm this afternoon in respiratory distress and unresponsive.  His brother reported that he was at his baseline complaining of difficulty breathing 1 hours prior to his brother finding him.   EMS called and reported Spo2 50% on arrival at which time they began providing assisted breaths with an ambu with subsequent improvement in Spo2 90's.  On arrival to ED, he was placed on BiPAP 10/15 with continuous nebs.  He received 125 mg solumedrol, 2 mg Magnesium.  His mental status worsened with somnolence and VBG showing pH 7.0, PaCO2 81.  He was subsequently intubated.  He was admitted to [facility] for further management and care.  Labs significant for acidosis, which was believed to be due to albuterol dosing.  Patient also requiring paralytics to help with respiratory mechanics.  Overall,  patient with subsequent improvement on 2/28 with eventual extubation on 3/1.  Patient stepped down to hospital medicine on 3/3.        Hospital Course:  No notes on file    Interval History: Patient stepped down to hospital medicine in AM.  Seen in AM doing well.  Was able to tolerate a solid breakfast.  Mother in room during rounds, all questions answered.        Review of Systems   Constitutional: Negative for chills and fever.   HENT: Negative for trouble swallowing and voice change.    Eyes: Negative for photophobia.   Respiratory: Negative for cough and shortness of breath.    Cardiovascular: Negative for chest pain, palpitations and leg swelling.   Gastrointestinal: Negative for abdominal distention, abdominal pain, constipation, diarrhea, nausea and vomiting.   Genitourinary: Negative for dysuria and hematuria.   Musculoskeletal: Negative for arthralgias and myalgias.   Skin: Negative for rash and wound.   Neurological: Negative for tremors, seizures, syncope, weakness, numbness and headaches.   Psychiatric/Behavioral: Negative for confusion and decreased concentration.     Objective:     Vital Signs (Most Recent):  Temp: 99 °F (37.2 °C) (03/03/18 1213)  Pulse: (!) 51 (03/03/18 1302)  Resp: 16 (03/03/18 1302)  BP: (!) 130/90 (03/03/18 1213)  SpO2: (!) 94 % (03/03/18 1213) Vital Signs (24h Range):  Temp:  [98.5 °F (36.9 °C)-100 °F (37.8 °C)] 99 °F (37.2 °C)  Pulse:  [50-83] 51  Resp:  [15-21] 16  SpO2:  [94 %-98 %] 94 %  BP: (126-170)/(81-99) 130/90     Weight: 57.2 kg (126 lb 1.7 oz)  Body mass index is 20.35 kg/m².    Intake/Output Summary (Last 24 hours) at 03/03/18 1335  Last data filed at 03/03/18 0533   Gross per 24 hour   Intake               60 ml   Output                0 ml   Net               60 ml      Physical Exam   Constitutional: He is oriented to person, place, and time. He appears well-developed and well-nourished. No distress.   Thin     HENT:   Head: Normocephalic and atraumatic.    Mouth/Throat: No oropharyngeal exudate.   Eyes: Pupils are equal, round, and reactive to light. No scleral icterus.   Neck: Normal range of motion.   Cardiovascular: Normal rate, regular rhythm and intact distal pulses.    Pulmonary/Chest: Effort normal and breath sounds normal. No respiratory distress. He has no wheezes. He has no rales.   Abdominal: Soft. Bowel sounds are normal. He exhibits no distension. There is no tenderness.   Musculoskeletal: Normal range of motion. He exhibits no edema.   Neurological: He is alert and oriented to person, place, and time. No cranial nerve deficit.   Skin: Skin is warm and dry. He is not diaphoretic. No erythema.   Psychiatric: He has a normal mood and affect. His behavior is normal.   Vitals reviewed.      Significant Labs:    Recent Results (from the past 24 hour(s))   Clostridium difficile EIA    Collection Time: 03/02/18  4:06 PM   Result Value Ref Range    C. diff Antigen Negative Negative    C difficile Toxins A+B, EIA Negative Negative   POCT glucose    Collection Time: 03/02/18  5:57 PM   Result Value Ref Range    POCT Glucose 99 70 - 110 mg/dL   POCT glucose    Collection Time: 03/02/18 11:22 PM   Result Value Ref Range    POCT Glucose 85 70 - 110 mg/dL   CBC auto differential    Collection Time: 03/03/18  6:58 AM   Result Value Ref Range    WBC 14.13 (H) 3.90 - 12.70 K/uL    RBC 4.55 (L) 4.60 - 6.20 M/uL    Hemoglobin 13.8 (L) 14.0 - 18.0 g/dL    Hematocrit 39.6 (L) 40.0 - 54.0 %    MCV 87 82 - 98 fL    MCH 30.3 27.0 - 31.0 pg    MCHC 34.8 32.0 - 36.0 g/dL    RDW 11.9 11.5 - 14.5 %    Platelets 192 150 - 350 K/uL    MPV 10.6 9.2 - 12.9 fL    Immature Granulocytes 0.7 (H) 0.0 - 0.5 %    Gran # (ANC) 9.6 (H) 1.8 - 7.7 K/uL    Immature Grans (Abs) 0.10 (H) 0.00 - 0.04 K/uL    Lymph # 2.3 1.0 - 4.8 K/uL    Mono # 2.1 (H) 0.3 - 1.0 K/uL    Eos # 0.0 0.0 - 0.5 K/uL    Baso # 0.03 0.00 - 0.20 K/uL    nRBC 0 0 /100 WBC    Gran% 67.9 38.0 - 73.0 %    Lymph% 16.0 (L) 18.0 -  48.0 %    Mono% 15.1 (H) 4.0 - 15.0 %    Eosinophil% 0.1 0.0 - 8.0 %    Basophil% 0.2 0.0 - 1.9 %    Platelet Estimate Appears normal     Aniso Slight     Differential Method Automated    Magnesium    Collection Time: 03/03/18  6:58 AM   Result Value Ref Range    Magnesium 2.3 1.6 - 2.6 mg/dL   Phosphorus    Collection Time: 03/03/18  6:58 AM   Result Value Ref Range    Phosphorus 3.4 2.7 - 4.5 mg/dL   Hepatic function panel    Collection Time: 03/03/18  6:58 AM   Result Value Ref Range    Total Protein 6.9 6.0 - 8.4 g/dL    Albumin 3.6 3.5 - 5.2 g/dL    Total Bilirubin 0.7 0.1 - 1.0 mg/dL    Bilirubin, Direct 0.3 0.1 - 0.3 mg/dL    AST 48 (H) 10 - 40 U/L    ALT 60 (H) 10 - 44 U/L    Alkaline Phosphatase 50 (L) 55 - 135 U/L   Renal function panel    Collection Time: 03/03/18  6:58 AM   Result Value Ref Range    Glucose 85 70 - 110 mg/dL    Sodium 140 136 - 145 mmol/L    Potassium 3.1 (L) 3.5 - 5.1 mmol/L    Chloride 103 95 - 110 mmol/L    CO2 23 23 - 29 mmol/L    BUN, Bld 26 (H) 6 - 20 mg/dL    Calcium 9.6 8.7 - 10.5 mg/dL    Creatinine 0.9 0.5 - 1.4 mg/dL    Albumin 3.5 3.5 - 5.2 g/dL    Phosphorus 3.4 2.7 - 4.5 mg/dL    eGFR if African American >60.0 >60 mL/min/1.73 m^2    eGFR if non African American >60.0 >60 mL/min/1.73 m^2    Anion Gap 14 8 - 16 mmol/L   POCT glucose    Collection Time: 03/03/18  1:05 PM   Result Value Ref Range    POCT Glucose 99 70 - 110 mg/dL       Significant Imaging:     US abdominal limited 3/2/2018    The visualized portion of the pancreas is unremarkable.      The liver is normal in size measuring 16 cm.  The liver demonstrates homogeneous echotexture.  No focal hepatic lesions are seen.    The gallbladder is slightly contracted with no evidence of calculi.  The common duct is not dilated, measuring 1 mm.  The gallbladder wall is not thickened.  There is no sonographic Boyle sign.  No dilated intrahepatic radicles are seen.  No pericholecystic fluid.    The spleen is normal in size  measuring 9.3 cm with a homogeneous echotexture.    The aorta tapers normally.    There is minimal volume ascites.    Assessment/Plan:      * Severe persistent asthma with status asthmaticus    Stanislaw Garcia is a 27 y.o. gentleman with childhood asthma (no PFTs on file) who presents to [facility] for hypoxic and hypercapneic respiratory failure.  Intubated in ED, brought initially to ICU for further ventilatory management.  Extubated on 3/2.  Triggers include weather changes, which patient states he has had, especially since he has to walk to work since his car is broken.  Otherwise, doing well today, at baseline breathing.  PT/OT consulted  · Patient started on breo in ICU, will continue  · PRN nebulizers  · Patient started on steroids, currently 5 days of steroid treatment thus far.  Will continue prednisone 40 mg PO daily for a total of 7 days, will need 1 additional day if patient is discharged tomorrow  · Patient to follow up with pulmonary in 2-4 weeks upon discharge  · Continue to monitor today           Transaminitis    · Noted elevation of transaminases during hospitalization  · Possibly related to medications, overall improving  · Ultrasound performed, no acute abnormalities  · Continue to monitor           Acute respiratory failure with hypoxia and hypercapnia    · As per above           VTE Risk Mitigation         Ordered     enoxaparin injection 40 mg  Daily     Route:  Subcutaneous        02/27/18 0800     Low Risk of VTE  Once      02/25/18 1720              Chintan Hernandez MD  Department of Hospital Medicine   [facility]

## 2018-03-03 NOTE — ASSESSMENT & PLAN NOTE
Stanislaw Garcia is a 27 y.o. gentleman with childhood asthma (no PFTs on file) who presents to [facility] for hypoxic and hypercapneic respiratory failure.  Intubated in ED, brought initially to ICU for further ventilatory management.  Extubated on 3/2.  Triggers include weather changes, which patient states he has had, especially since he has to walk to work since his car is broken.  Otherwise, doing well today, at baseline breathing.  PT/OT consulted  · Patient started on breo in ICU, will continue  · PRN nebulizers  · Patient started on steroids, currently 5 days of steroid treatment thus far.  Will continue prednisone 40 mg PO daily for a total of 7 days, will need 1 additional day if patient is discharged tomorrow  · Patient to follow up with pulmonary in 2-4 weeks upon discharge  · Continue to monitor today

## 2018-03-03 NOTE — PLAN OF CARE
Problem: Patient Care Overview  Goal: Plan of Care Review  Outcome: Ongoing (interventions implemented as appropriate)  meds administered as ordered, able to make needs known, no distress noted.    Problem: Fall Risk (Adult)  Goal: Absence of Falls  Patient will demonstrate the desired outcomes by discharge/transition of care.   Outcome: Ongoing (interventions implemented as appropriate)   03/03/18 1606   Fall Risk (Adult)   Absence of Falls making progress toward outcome

## 2018-03-03 NOTE — SUBJECTIVE & OBJECTIVE
Interval History: Patient stepped down to hospital medicine in AM.  Seen in AM doing well.  Was able to tolerate a solid breakfast.  Mother in room during rounds, all questions answered.        Review of Systems   Constitutional: Negative for chills and fever.   HENT: Negative for trouble swallowing and voice change.    Eyes: Negative for photophobia.   Respiratory: Negative for cough and shortness of breath.    Cardiovascular: Negative for chest pain, palpitations and leg swelling.   Gastrointestinal: Negative for abdominal distention, abdominal pain, constipation, diarrhea, nausea and vomiting.   Genitourinary: Negative for dysuria and hematuria.   Musculoskeletal: Negative for arthralgias and myalgias.   Skin: Negative for rash and wound.   Neurological: Negative for tremors, seizures, syncope, weakness, numbness and headaches.   Psychiatric/Behavioral: Negative for confusion and decreased concentration.     Objective:     Vital Signs (Most Recent):  Temp: 99 °F (37.2 °C) (03/03/18 1213)  Pulse: (!) 51 (03/03/18 1302)  Resp: 16 (03/03/18 1302)  BP: (!) 130/90 (03/03/18 1213)  SpO2: (!) 94 % (03/03/18 1213) Vital Signs (24h Range):  Temp:  [98.5 °F (36.9 °C)-100 °F (37.8 °C)] 99 °F (37.2 °C)  Pulse:  [50-83] 51  Resp:  [15-21] 16  SpO2:  [94 %-98 %] 94 %  BP: (126-170)/(81-99) 130/90     Weight: 57.2 kg (126 lb 1.7 oz)  Body mass index is 20.35 kg/m².    Intake/Output Summary (Last 24 hours) at 03/03/18 1335  Last data filed at 03/03/18 0533   Gross per 24 hour   Intake               60 ml   Output                0 ml   Net               60 ml      Physical Exam   Constitutional: He is oriented to person, place, and time. He appears well-developed and well-nourished. No distress.   Thin     HENT:   Head: Normocephalic and atraumatic.   Mouth/Throat: No oropharyngeal exudate.   Eyes: Pupils are equal, round, and reactive to light. No scleral icterus.   Neck: Normal range of motion.   Cardiovascular: Normal rate,  regular rhythm and intact distal pulses.    Pulmonary/Chest: Effort normal and breath sounds normal. No respiratory distress. He has no wheezes. He has no rales.   Abdominal: Soft. Bowel sounds are normal. He exhibits no distension. There is no tenderness.   Musculoskeletal: Normal range of motion. He exhibits no edema.   Neurological: He is alert and oriented to person, place, and time. No cranial nerve deficit.   Skin: Skin is warm and dry. He is not diaphoretic. No erythema.   Psychiatric: He has a normal mood and affect. His behavior is normal.   Vitals reviewed.      Significant Labs:    Recent Results (from the past 24 hour(s))   Clostridium difficile EIA    Collection Time: 03/02/18  4:06 PM   Result Value Ref Range    C. diff Antigen Negative Negative    C difficile Toxins A+B, EIA Negative Negative   POCT glucose    Collection Time: 03/02/18  5:57 PM   Result Value Ref Range    POCT Glucose 99 70 - 110 mg/dL   POCT glucose    Collection Time: 03/02/18 11:22 PM   Result Value Ref Range    POCT Glucose 85 70 - 110 mg/dL   CBC auto differential    Collection Time: 03/03/18  6:58 AM   Result Value Ref Range    WBC 14.13 (H) 3.90 - 12.70 K/uL    RBC 4.55 (L) 4.60 - 6.20 M/uL    Hemoglobin 13.8 (L) 14.0 - 18.0 g/dL    Hematocrit 39.6 (L) 40.0 - 54.0 %    MCV 87 82 - 98 fL    MCH 30.3 27.0 - 31.0 pg    MCHC 34.8 32.0 - 36.0 g/dL    RDW 11.9 11.5 - 14.5 %    Platelets 192 150 - 350 K/uL    MPV 10.6 9.2 - 12.9 fL    Immature Granulocytes 0.7 (H) 0.0 - 0.5 %    Gran # (ANC) 9.6 (H) 1.8 - 7.7 K/uL    Immature Grans (Abs) 0.10 (H) 0.00 - 0.04 K/uL    Lymph # 2.3 1.0 - 4.8 K/uL    Mono # 2.1 (H) 0.3 - 1.0 K/uL    Eos # 0.0 0.0 - 0.5 K/uL    Baso # 0.03 0.00 - 0.20 K/uL    nRBC 0 0 /100 WBC    Gran% 67.9 38.0 - 73.0 %    Lymph% 16.0 (L) 18.0 - 48.0 %    Mono% 15.1 (H) 4.0 - 15.0 %    Eosinophil% 0.1 0.0 - 8.0 %    Basophil% 0.2 0.0 - 1.9 %    Platelet Estimate Appears normal     Aniso Slight     Differential Method  Automated    Magnesium    Collection Time: 03/03/18  6:58 AM   Result Value Ref Range    Magnesium 2.3 1.6 - 2.6 mg/dL   Phosphorus    Collection Time: 03/03/18  6:58 AM   Result Value Ref Range    Phosphorus 3.4 2.7 - 4.5 mg/dL   Hepatic function panel    Collection Time: 03/03/18  6:58 AM   Result Value Ref Range    Total Protein 6.9 6.0 - 8.4 g/dL    Albumin 3.6 3.5 - 5.2 g/dL    Total Bilirubin 0.7 0.1 - 1.0 mg/dL    Bilirubin, Direct 0.3 0.1 - 0.3 mg/dL    AST 48 (H) 10 - 40 U/L    ALT 60 (H) 10 - 44 U/L    Alkaline Phosphatase 50 (L) 55 - 135 U/L   Renal function panel    Collection Time: 03/03/18  6:58 AM   Result Value Ref Range    Glucose 85 70 - 110 mg/dL    Sodium 140 136 - 145 mmol/L    Potassium 3.1 (L) 3.5 - 5.1 mmol/L    Chloride 103 95 - 110 mmol/L    CO2 23 23 - 29 mmol/L    BUN, Bld 26 (H) 6 - 20 mg/dL    Calcium 9.6 8.7 - 10.5 mg/dL    Creatinine 0.9 0.5 - 1.4 mg/dL    Albumin 3.5 3.5 - 5.2 g/dL    Phosphorus 3.4 2.7 - 4.5 mg/dL    eGFR if African American >60.0 >60 mL/min/1.73 m^2    eGFR if non African American >60.0 >60 mL/min/1.73 m^2    Anion Gap 14 8 - 16 mmol/L   POCT glucose    Collection Time: 03/03/18  1:05 PM   Result Value Ref Range    POCT Glucose 99 70 - 110 mg/dL       Significant Imaging:     US abdominal limited 3/2/2018    The visualized portion of the pancreas is unremarkable.      The liver is normal in size measuring 16 cm.  The liver demonstrates homogeneous echotexture.  No focal hepatic lesions are seen.    The gallbladder is slightly contracted with no evidence of calculi.  The common duct is not dilated, measuring 1 mm.  The gallbladder wall is not thickened.  There is no sonographic Boyle sign.  No dilated intrahepatic radicles are seen.  No pericholecystic fluid.    The spleen is normal in size measuring 9.3 cm with a homogeneous echotexture.    The aorta tapers normally.    There is minimal volume ascites.

## 2018-03-03 NOTE — PLAN OF CARE
Problem: Occupational Therapy Goal  Goal: Occupational Therapy Goal  Goals to be met by: 3/15/18     Patient will increase functional independence with ADLs by performing:    LE Dressing with [place].  Toileting from toilet with [place] for hygiene and clothing management.   Stand pivot transfers with [place].  Tub or shower transfer with [place].    Outcome: Ongoing (interventions implemented as appropriate)  OT eval completed. The above goals are established to improve functional (I) and mobility.    CRYS Lynn  3/3/2018  Pager: [phone]

## 2018-03-03 NOTE — ASSESSMENT & PLAN NOTE
· Noted elevation of transaminases during hospitalization  · Possibly related to medications, overall improving  · Ultrasound performed, no acute abnormalities  · Continue to monitor

## 2018-03-03 NOTE — PLAN OF CARE
Problem: Patient Care Overview  Goal: Plan of Care Review  Outcome: Ongoing (interventions implemented as appropriate)   03/02/18 1822   Coping/Psychosocial   Plan Of Care Reviewed With patient       Problem: Fall Risk (Adult)  Goal: Absence of Falls  Patient will demonstrate the desired outcomes by discharge/transition of care.   Outcome: Ongoing (interventions implemented as appropriate)   03/02/18 1822   Fall Risk (Adult)   Absence of Falls making progress toward outcome       Problem: Pressure Ulcer Risk (Lm Scale) (Adult,Obstetrics,Pediatric)  Goal: Skin Integrity  Patient will demonstrate the desired outcomes by discharge/transition of care.   Outcome: Ongoing (interventions implemented as appropriate)   03/02/18 1822   Pressure Ulcer Risk (Lm Scale) (Adult,Obstetrics,Pediatric)   Skin Integrity making progress toward outcome

## 2018-03-04 VITALS
WEIGHT: 126.13 LBS | OXYGEN SATURATION: 97 % | HEIGHT: 66 IN | HEART RATE: 54 BPM | BODY MASS INDEX: 20.27 KG/M2 | SYSTOLIC BLOOD PRESSURE: 118 MMHG | RESPIRATION RATE: 17 BRPM | TEMPERATURE: 99 F | DIASTOLIC BLOOD PRESSURE: 81 MMHG

## 2018-03-04 PROBLEM — R74.01 TRANSAMINITIS: Status: RESOLVED | Noted: 2018-03-03 | Resolved: 2018-03-04

## 2018-03-04 PROBLEM — G93.41 ENCEPHALOPATHY, METABOLIC: Status: RESOLVED | Noted: 2018-02-25 | Resolved: 2018-03-04

## 2018-03-04 LAB
ALBUMIN SERPL BCP-MCNC: 3.2 G/DL
ALBUMIN SERPL BCP-MCNC: 3.2 G/DL
ALP SERPL-CCNC: 45 U/L
ALT SERPL W/O P-5'-P-CCNC: 49 U/L
ANION GAP SERPL CALC-SCNC: 11 MMOL/L
ANION GAP SERPL CALC-SCNC: 11 MMOL/L
AST SERPL-CCNC: 29 U/L
BASOPHILS # BLD AUTO: 0.02 K/UL
BASOPHILS NFR BLD: 0.2 %
BILIRUB SERPL-MCNC: 0.6 MG/DL
BUN SERPL-MCNC: 25 MG/DL
BUN SERPL-MCNC: 25 MG/DL
CALCIUM SERPL-MCNC: 8.9 MG/DL
CALCIUM SERPL-MCNC: 8.9 MG/DL
CHLORIDE SERPL-SCNC: 105 MMOL/L
CHLORIDE SERPL-SCNC: 105 MMOL/L
CO2 SERPL-SCNC: 24 MMOL/L
CO2 SERPL-SCNC: 24 MMOL/L
CREAT SERPL-MCNC: 0.8 MG/DL
CREAT SERPL-MCNC: 0.8 MG/DL
DIFFERENTIAL METHOD: ABNORMAL
EOSINOPHIL # BLD AUTO: 0.1 K/UL
EOSINOPHIL NFR BLD: 0.6 %
ERYTHROCYTE [DISTWIDTH] IN BLOOD BY AUTOMATED COUNT: 11.9 %
EST. GFR  (AFRICAN AMERICAN): >60 ML/MIN/1.73 M^2
EST. GFR  (AFRICAN AMERICAN): >60 ML/MIN/1.73 M^2
EST. GFR  (NON AFRICAN AMERICAN): >60 ML/MIN/1.73 M^2
EST. GFR  (NON AFRICAN AMERICAN): >60 ML/MIN/1.73 M^2
GLUCOSE SERPL-MCNC: 109 MG/DL
GLUCOSE SERPL-MCNC: 109 MG/DL
HCT VFR BLD AUTO: 37.7 %
HGB BLD-MCNC: 13.3 G/DL
IMM GRANULOCYTES # BLD AUTO: 0.13 K/UL
IMM GRANULOCYTES NFR BLD AUTO: 1 %
LYMPHOCYTES # BLD AUTO: 2.6 K/UL
LYMPHOCYTES NFR BLD: 20.7 %
MAGNESIUM SERPL-MCNC: 2 MG/DL
MCH RBC QN AUTO: 30.8 PG
MCHC RBC AUTO-ENTMCNC: 35.3 G/DL
MCV RBC AUTO: 87 FL
MONOCYTES # BLD AUTO: 1.6 K/UL
MONOCYTES NFR BLD: 12.9 %
NEUTROPHILS # BLD AUTO: 8.1 K/UL
NEUTROPHILS NFR BLD: 64.6 %
NRBC BLD-RTO: 0 /100 WBC
PHOSPHATE SERPL-MCNC: 4.1 MG/DL
PLATELET # BLD AUTO: 182 K/UL
PMV BLD AUTO: 10.3 FL
POTASSIUM SERPL-SCNC: 2.9 MMOL/L
POTASSIUM SERPL-SCNC: 2.9 MMOL/L
PROT SERPL-MCNC: 6.3 G/DL
RBC # BLD AUTO: 4.32 M/UL
SODIUM SERPL-SCNC: 140 MMOL/L
SODIUM SERPL-SCNC: 140 MMOL/L
WBC # BLD AUTO: 12.54 K/UL

## 2018-03-04 PROCEDURE — 25000003 PHARM REV CODE 250: Performed by: STUDENT IN AN ORGANIZED HEALTH CARE EDUCATION/TRAINING PROGRAM

## 2018-03-04 PROCEDURE — 36415 COLL VENOUS BLD VENIPUNCTURE: CPT

## 2018-03-04 PROCEDURE — 99239 HOSP IP/OBS DSCHRG MGMT >30: CPT | Mod: ,,, | Performed by: HOSPITALIST

## 2018-03-04 PROCEDURE — 80053 COMPREHEN METABOLIC PANEL: CPT

## 2018-03-04 PROCEDURE — A4216 STERILE WATER/SALINE, 10 ML: HCPCS | Performed by: NURSE PRACTITIONER

## 2018-03-04 PROCEDURE — 85025 COMPLETE CBC W/AUTO DIFF WBC: CPT

## 2018-03-04 PROCEDURE — 83735 ASSAY OF MAGNESIUM: CPT

## 2018-03-04 PROCEDURE — 63600175 PHARM REV CODE 636 W HCPCS: Performed by: INTERNAL MEDICINE

## 2018-03-04 PROCEDURE — 80069 RENAL FUNCTION PANEL: CPT

## 2018-03-04 PROCEDURE — 25000003 PHARM REV CODE 250: Performed by: NURSE PRACTITIONER

## 2018-03-04 RX ORDER — POTASSIUM CHLORIDE 20 MEQ/15ML
40 SOLUTION ORAL ONCE
Status: COMPLETED | OUTPATIENT
Start: 2018-03-04 | End: 2018-03-04

## 2018-03-04 RX ORDER — PREDNISONE 20 MG/1
40 TABLET ORAL DAILY
Qty: 8 TABLET | Refills: 0 | Status: SHIPPED | OUTPATIENT
Start: 2018-03-04 | End: 2018-03-04

## 2018-03-04 RX ORDER — FLUTICASONE FUROATE AND VILANTEROL 200; 25 UG/1; UG/1
1 POWDER RESPIRATORY (INHALATION) DAILY
Qty: 60 EACH | Refills: 3 | Status: SHIPPED | OUTPATIENT
Start: 2018-03-04

## 2018-03-04 RX ORDER — PREDNISONE 20 MG/1
40 TABLET ORAL DAILY
Qty: 2 TABLET | Refills: 0 | Status: SHIPPED | OUTPATIENT
Start: 2018-03-04 | End: 2018-03-05

## 2018-03-04 RX ORDER — POTASSIUM CHLORIDE 20 MEQ/15ML
40 SOLUTION ORAL
Status: DISCONTINUED | OUTPATIENT
Start: 2018-03-04 | End: 2018-03-04

## 2018-03-04 RX ADMIN — POTASSIUM CHLORIDE 40 MEQ: 20 SOLUTION ORAL at 08:03

## 2018-03-04 RX ADMIN — Medication 3 ML: at 06:03

## 2018-03-04 RX ADMIN — ALBUTEROL SULFATE 2 PUFF: 90 AEROSOL, METERED RESPIRATORY (INHALATION) at 12:03

## 2018-03-04 RX ADMIN — POTASSIUM CHLORIDE 40 MEQ: 20 SOLUTION ORAL at 06:03

## 2018-03-04 RX ADMIN — FLUTICASONE FUROATE AND VILANTEROL TRIFENATATE 1 PUFF: 200; 25 POWDER RESPIRATORY (INHALATION) at 08:03

## 2018-03-04 RX ADMIN — PREDNISONE 40 MG: 20 TABLET ORAL at 08:03

## 2018-03-04 RX ADMIN — ALBUTEROL SULFATE 2 PUFF: 90 AEROSOL, METERED RESPIRATORY (INHALATION) at 03:03

## 2018-03-04 RX ADMIN — ALBUTEROL SULFATE 2 PUFF: 90 AEROSOL, METERED RESPIRATORY (INHALATION) at 08:03

## 2018-03-04 NOTE — DISCHARGE INSTRUCTIONS
Please continue use of Breo inhaler that was started during hospitalization and finish your 7-day course of oral steroid therapy (1 dose remaining on 3/5). Follow-up with the pulmonology clinic in 2-4 weeks and return to the ED immediately if symptoms return. A representative from family medicine should contact you to set up an appointment to establish care with a primary care doctor. Please call [phone] if you do not hear from them by the end of the week.

## 2018-03-04 NOTE — DISCHARGE SUMMARY
[facility] Hospital Medicine  Discharge Summary      Patient Name: Stanislaw Garcia  MRN: [id number]  Admission Date: 2/25/2018  Hospital Length of Stay: 7 days  Discharge Date and Time:  03/04/2018 8:49 AM  Attending Physician: Eunice Hu MD   Discharging Provider: Yaakov Norton MD  Primary Care Provider: Primary Doctor [facility] Medicine Team: [facility] 4 Yaakov Norton MD    HPI:   Stanislaw Garcia is a 27 y.o. male with history of asthma since childhood who presented to the ED with severe asthma exacerbation with acute hypercapnic and hypoxemic respiratory failure. He reportedly ran out of his albuterol.  History obtained from brother, Luis A, and patient's mother.  Mr. Garcia only takes an albuterol inhaler for his asthma and was reportedly well controlled until December when he presented to the ED with asthma exacerbation.  He was treated in the ED and discharged home.  He reportedly improved following exacerbation; however, again worsened 1 month ago.  Patient's mother reports that he has not been feeling well for 3 days.  No reports of sick contacts. His brother found him at ~3 pm this afternoon in respiratory distress and unresponsive.  His brother reported that he was at his baseline complaining of difficulty breathing 1 hours prior to his brother finding him.   EMS called and reported Spo2 50% on arrival at which time they began providing assisted breaths with an ambu with subsequent improvement in Spo2 90's.  On arrival to ED, he was placed on BiPAP 10/15 with continuous nebs.  He received 125 mg solumedrol, 2 mg Magnesium.  His mental status worsened with somnolence and VBG showing pH 7.0, PaCO2 81.  He was subsequently intubated.      * No surgery found *      Hospital Course:   Patient was admitted to CMICU for further management and care on 2/25.  Labs significant for acidosis, which was believed to be due to albuterol dosing.  Patient also requiring  paralytics to help with respiratory mechanics.  Overall, patient with subsequent improvement on 2/28 with eventual extubation on 3/1.  Patient stepped down to hospital medicine on 3/3. Patient's condition stabilized and maintained adequate saturations on room air. He no longer experienced any symptoms of chest tightness, cough, or wheeze. He was discharged on 3/4 with a new prescription for ICS/LABA and close follow-up in 4 weeks with pulmonology. He was advised to present to the ED immediately with any further shortness or breath or wheezing or non-resolution with rescue inhaler.     Consults:   Consults         Status Ordering Provider     Inpatient consult to Critical Care Medicine  Once     Provider:  (Not yet assigned)    Completed AJ VAZ     Inpatient consult to Registered Dietitian/Nutritionist  Once     Provider:  (Not yet assigned)    Completed CARMEN MAGUIRE     IP consult to dietary  Once     Provider:  (Not yet assigned)    Completed CEASAR HERNANDEZ          * Severe persistent asthma with status asthmaticus    Stanislaw Garcia is a 27 y.o. gentleman with childhood asthma (no PFTs on file) who [place] [facility] [place]neic respiratory [place].  Intubated in ED, brought initially to ICU for further ventilatory management.  Extubated on 3/2.  Triggers include weather changes, which patient states he has had, especially since he has to walk to work since his car is broken.  Otherwise, doing well today, at baseline breathing.  PT/OT consulted  · Patient started on breo in ICU, will continue outpatient  · Continue rescue inhaler PRN  · Patient started on steroids, currently 6 days of steroid treatment thus far. Will continue prednisone 40 mg PO daily for a total of 7 days, 1 additional day prescribed on discharge (ending 3.5)  · Instructions to follow up with pulmonary in 2-4 weeks upon discharge and return to ED if symptoms worsen          Acute respiratory failure with hypoxia  and hypercapnia    · As per above           Final Active Diagnoses:    Diagnosis Date Noted POA    PRINCIPAL PROBLEM:  Severe persistent asthma with status asthmaticus [J45.52] 02/25/2018 Yes    Acute respiratory failure with hypoxia and hypercapnia [J96.01, J96.02] 02/25/2018 Yes      Problems Resolved During this Admission:    Diagnosis Date Noted Date Resolved POA    Transaminitis [R74.0] 03/03/2018 03/04/2018 Unknown    Generalized abdominal pain [R10.84] 03/02/2018 03/03/2018 Unknown    Respiratory failure [J96.90] 03/02/2018 03/03/2018 Yes    Anemia [D64.9] 02/27/2018 03/03/2018 Unknown    Lactic acidosis [E87.2] 02/26/2018 03/01/2018 Unknown    Encephalopathy, metabolic [G93.41] 02/25/2018 03/04/2018 Yes       Discharged Condition: good    Disposition: To home    Follow Up:  Follow-up Information     [facility] [place] [facility]. Schedule an appointment as soon as possible for a visit in 4 weeks.    Specialty:  Pulmonology  Why:  With the fellow clinic for recent asthma exacerbations and medication management  [facility] information[facility]  [facility] [id number] [phone]               Patient Instructions:   Please continue use of Breo inhaler that was started during hospitalization and finish your 7-day course of oral steroid therapy (1 dose remaining on 3/5). Follow-up with the pulmonology clinic in 2-4 weeks and return to the ED immediately if symptoms return.     Ambulatory Referral to Pulmonology   Referral Priority: Routine Referral Type: Consultation   Referral Reason: Specialty Services Required    Requested Specialty: Pulmonary Disease    Number of Visits Requested: 1          Significant Diagnostic Studies: Labs:   BMP:   Recent Labs  Lab 03/03/18  0658 03/04/18  0406   GLU 85 109    140   K 3.1* 2.9*    105   CO2 23 24   BUN 26* 25*   CREATININE 0.9 0.8   CALCIUM 9.6 8.9   MG 2.3 2.0    and CBC   Recent Labs  Lab 03/03/18  0658 03/04/18  0406   WBC 14.13*  12.54   HGB 13.8* 13.3*   HCT 39.6* 37.7*    182       Pending Diagnostic Studies:     None         Medications:  Reconciled Home Medications:   Current Discharge Medication List      START taking these medications    Details   fluticasone-vilanterol (BREO) 200-25 mcg/dose DsDv diskus inhaler Inhale 1 puff into the lungs once daily. Controller  Qty: 60 each, Refills: 3      predniSONE (DELTASONE) 20 MG tablet Take 2 tablets (40 mg total) by mouth once daily.  Qty: 2 tablet, Refills: 0         CONTINUE these medications which have NOT CHANGED    Details   albuterol 90 mcg/actuation inhaler Inhale 1-2 puffs into the lungs every 6 (six) hours as needed for Wheezing. Rescue  Qty: 1 Inhaler, Refills: 0             Indwelling Lines/Drains at time of discharge:   Lines/Drains/Airways          No matching active lines, drains, or airways          Time spent on the discharge of patient: 45 minutes  Patient was seen and examined on the date of discharge and determined to be suitable for discharge.         Yaakov Norton MD  Department of Hospital Medicine  [facility]

## 2018-03-04 NOTE — PLAN OF CARE
Problem: Patient Care Overview  Goal: Plan of Care Review  Outcome: Ongoing (interventions implemented as appropriate)  Greeted him and gained consent for nursing care. Awake, alert, oriented. No complaints of pain. No difficulty of breathing. On q 4 hr inhalation regimen. Kept him comfortable. Assisted in his needs. Put bed on lowest position, locked. Call bell within reach. POC discussed, verbalized understanding. Regularly checked on him. Will continue to monitor.

## 2018-03-04 NOTE — HOSPITAL COURSE
Patient was admitted to CMICU for further management and care on 2/25.  Labs significant for acidosis, which was believed to be due to albuterol dosing.  Patient also requiring paralytics to help with respiratory mechanics.  Overall, patient with subsequent improvement on 2/28 with eventual extubation on 3/1.  Patient stepped down to hospital medicine on 3/3. Patient's condition stabilized and maintained adequate saturations on room air. He no longer experienced any symptoms of chest tightness, cough, or wheeze. He was discharged on 3/4 with a new prescription for ICS/LABA and close follow-up in 4 weeks with pulmonology. He was advised to present to the ED immediately with any further shortness or breath or wheezing or non-resolution with rescue inhaler. Unfortunately patient has medicaid and does not qualify for priority clinic appointment. An ambulatory referral was made to family medicine to establish care.

## 2018-03-04 NOTE — ASSESSMENT & PLAN NOTE
Stanislaw Garcia is a 27 y.o. gentleman with childhood asthma (no PFTs on file) who presents to [facility] for hypoxic and hypercapneic respiratory failure.  Intubated in ED, brought initially to ICU for further ventilatory management.  Extubated on 3/2.  Triggers include weather changes, which patient states he has had, especially since he has to walk to work since his car is broken.  Otherwise, doing well today, at baseline breathing.  PT/OT consulted  · Patient started on breo in ICU, will continue outpatient  · Continue rescue inhaler PRN  · Patient started on steroids, currently 6 days of steroid treatment thus far. Will continue prednisone 40 mg PO daily for a total of 7 days, 1 additional day prescribed on discharge (ending 3.5)  · Instructions to follow up with pulmonary in 2-4 weeks upon discharge and return to ED if symptoms worsen

## 2018-03-04 NOTE — PLAN OF CARE
Problem: Patient Care Overview  Goal: Plan of Care Review  Outcome: Ongoing (interventions implemented as appropriate)  Pt dc 'd as ordered, IV dc 'd catheter intact, verbalizes instructions and Rx's, pt escorted out via wheelchair per transport with mother, no distress noted.    Problem: Fall Risk (Adult)  Goal: Absence of Falls  Patient will demonstrate the desired outcomes by discharge/transition of care.   Outcome: Outcome(s) achieved Date Met: 03/04/18 03/04/18 1355   Fall Risk (Adult)   Absence of Falls achieves outcome

## 2018-03-05 RX ORDER — ALBUTEROL SULFATE 90 UG/1
1-2 AEROSOL, METERED RESPIRATORY (INHALATION) EVERY 6 HOURS PRN
Qty: 1 INHALER | Refills: 5 | Status: SHIPPED | OUTPATIENT
Start: 2018-03-05 | End: 2019-03-05

## 2018-03-08 NOTE — PT/OT/SLP DISCHARGE
Physical Therapy Discharge Summary    Name: Stanislaw Garcia  MRN: [id number]   Principal Problem: Severe persistent asthma with status asthmaticus     Patient Discharged from acute Physical Therapy on 3/3/18.  Please refer to prior PT noted date on 3/4/18 for functional status.     Assessment:     Patient appropriate for care in another setting.    Objective:     GOALS:    Physical Therapy Goals        Problem: Physical Therapy Goal    Goal Priority Disciplines Outcome Goal Variances Interventions   Physical Therapy Goal     PT/OT, PT Ongoing (interventions implemented as appropriate)     Description:  Goals to be met by: 3/13/18    Patient will increase functional independence with mobility by performin. Sit to stand transfer with Modified [place]  2. Bed to chair transfer with Modified [place]  3. Gait  x 150 feet with Supervision  4. Ascend/descend 10 stair with bilateral Handrails Modified [place]                      Reasons for Discontinuation of Therapy Services  Transfer to alternate level of care.      Plan:     Patient Discharged to: Home no PT services needed.    Bogdan Duffy III, PT  3/8/2018

## 2018-03-24 PROBLEM — R74.01 TRANSAMINITIS: Status: RESOLVED | Noted: 2018-03-24 | Resolved: 2018-03-04

## 2018-03-24 PROBLEM — R10.84 GENERALIZED ABDOMINAL PAIN: Status: RESOLVED | Noted: 2018-03-24 | Resolved: 2018-03-03

## 2018-03-24 PROBLEM — D64.9 ANEMIA: Status: RESOLVED | Noted: 2018-03-24 | Resolved: 2018-03-03

## 2018-05-09 DIAGNOSIS — J45.909 ASTHMA, UNSPECIFIED ASTHMA SEVERITY, UNSPECIFIED WHETHER COMPLICATED, UNSPECIFIED WHETHER PERSISTENT: Primary | ICD-10-CM

## 2018-08-08 NOTE — PROGRESS NOTES
"[facility]  Adult Nutrition  Progress Note    SUMMARY     Recommendations  Recommendation/Intervention:   1. Encourage increased PO intake as tolerated.   2. Recommend adding Boost Plus OS to all meals to increase calorie and protein intake.   RD to monitor.    Goals: Patient to receive nutrition by RD follow-up  Nutrition Goal Status: goal met  Communication of RD Recs:  (POC)    Reason for Assessment  Reason for Assessment: RD follow-up  Diagnosis:  (respiratory failure)  Relevent Medical History: asthma, tobacco abuse   General Information Comments: Patient extubated yesterday. Patient's mom reports patient with fair appetite, consuming about 50% of meals. No breakfast yet. Patient interested in trying Boost while appetite is down.  Nutrition Discharge Planning: Adequate nutrition via PO intake.    Nutrition Prescription Ordered  Current Diet Order: Regular    Evaluation of Received Nutrients/Fluid Intake  I/O: +1.7L since admit  Comments: LBM 3/, good UOP  % Intake of Estimated Energy Needs: 50 - 75 %  % Meal Intake: 50%     Nutrition/Diet History  Food Preferences: No cultural/Orthodoxy needs identified at this time.  Factors Affecting Nutritional Intake: decreased appetite    Labs/Tests/Procedures/Meds   Pertinent Labs Reviewed: reviewed  Pertinent Labs Comments: K 3.4, BUN 25, Alb 3.1, AST 70, ALT 55  Pertinent Medications Reviewed: reviewed  Pertinent Medications Comments: prednisone    Physical Findings  Overall Physical Appearance: on oxygen therapy  Tubes:  (none)  Oral/Mouth Cavity: WDL  Skin: intact    Anthropometrics  Height: 5' 6" (167.6 cm) (reviewed)  Weight Method: Bed Scale  Weight: 57.2 kg (126 lb 1.7 oz)  Ideal Body Weight (IBW), Male: 142 lb  % Ideal Body Weight, Male (lb): 77.63 lb  BMI (Calculated): 17.8  BMI Grade: 17 - 18.4 protein-energy malnutrition grade I  Usual Body Weight (UBW), k.2 kg (2017 per chart review)  % Usual Body Weight: 95.99  % Weight Change " Bailey... From Usual Weight: -4.22 %    Estimated/Assessed Needs  Weight Used For Calorie Calculations: 57.2 kg (126 lb 1.7 oz)   Energy Calorie Requirements (kcal): 1875 kcal/day  Energy Need Method: [place]-St Jeor (x 1.25)  RMR ([place]-St. Jeor Equation): 1499.75  RMR ([place] Equation): 1604.35  RMR (Modified [place] Equation): 1696.49  Weight Used For Protein Calculations: 57.2 kg (126 lb 1.7 oz)  Protein Requirements: 57-69 g/day (1.0-1.2 g/kg)  Fluid Requirements (mL): 1 mL/kcal or per MD  Fluid Need Method: RDA Method  RDA Method (mL): 1875    Assessment and Plan  Acute respiratory failure with hypoxia and hypercapnia    Nutrition Problem  Inadequate energy intake    Related to (etiology):   Decreased ability to consume sufficient energy    Signs and Symptoms (as evidenced by):   NPO and meeting < 85% EEN and EPN    Nutrition Diagnosis Status:   Improving          Monitor and Evaluation  Food and Nutrient Intake: energy intake, food and beverage intake  Food and Nutrient Adminstration: diet order  Physical Activity and Function: nutrition-related ADLs and IADLs  Anthropometric Measurements: weight, weight change  Biochemical Data, Medical Tests and Procedures: electrolyte and renal panel, gastrointestinal profile, inflammatory profile  Nutrition-Focused Physical Findings: overall appearance    Nutrition Risk  Level of Risk:  (1x/week)    Nutrition Follow-Up  RD Follow-up?: Yes

## 2018-11-05 NOTE — PT/OT/SLP EVAL
Called pt for monthly CCM outreach, left message to call back.     Chart review - 4 min Occupational Therapy   Evaluation and Treatment    Name: Stanislaw Garcia  MRN: [id number]  Admitting Diagnosis:  Severe persistent asthma with status asthmaticus      Recommendations:     Discharge Recommendations:  (Home)  Discharge Equipment Recommendations:  none  Barriers to discharge:  None    History:     Occupational Profile:  Living Environment: Pt lives alone in a 2nd floor apartment with a flight of stairs to enter with B rails. Pt has a tub/shower combo  Previous level of function: (I), drives, works as a /  Roles and Routines: friend,   Equipment Owned:  none  Assistance upon Discharge: none, possibly friends, but no one to stay with him at discharge    Past Medical History:   Diagnosis Date    Asthma        No past surgical history on file.    Subjective     Chief Complaint: fatigue  Patient/Family stated goals: get better and go home  Communicated with: RN prior to session.  Pain/Comfort:  · Pain Rating 1: 0/10  · Pain Rating Post-Intervention 1: 0/10    Patients cultural, spiritual, Jew conflicts given the current situation: none stated    Objective:     Patient found with:  (none)    General Precautions: Standard, fall   Orthopedic Precautions:N/A   Braces: N/A     Occupational Performance:    Bed Mobility:    · Patient completed Supine to Sit with supervision    Functional Mobility/Transfers:  · Patient completed Sit <> Stand Transfer with stand by assistance  with  no assistive device   · Patient completed Bed <> Chair Transfer using Stand Pivot technique with contact guard assistance with no assistive device  · Functional Mobility: CGA with no AD    Activities of Daily Living:  · UB Dressing: stand by assistance gown as robe  · LB Dressing: stand by assistance socks  · Toileting: per pt report, has been able to use bathroom toilet (no briefs, but able to manage gowns)    Cognitive/Visual Perceptual:  Cognitive/Psychosocial Skills:     -       Oriented to: Person,  Place, Time and Situation   -       Follows Commands/attention:Follows multistep  commands  -       Communication: clear/fluent  -       Memory: No Deficits noted  -       Safety awareness/insight to disability: intact   -       Mood/Affect/Coping skills/emotional control: Appropriate to situation  Visual/Perceptual:      -Intact    Physical Exam:  Balance:    -       Sitting = Good; Standing = Fair  Postural examination/scapula alignment:    -       No postural abnormalities identified  Dominant hand:    -       R  Upper Extremity Range of Motion:     -       Right Upper Extremity: WFL  -       Left Upper Extremity: WFL  Upper Extremity Strength:    -       Right Upper Extremity: 3+/5  -       Left Upper Extremity: 3+/5   Strength:    -       Right Upper Extremity: WFL  -       Left Upper Extremity: WFL  Fine Motor Coordination:    -       Intact  Gross motor coordination:   WFL, slight impairment during ambulation, able to self-correct LOB.    Patient left sitting EOB with call button in reach    AMPA 6 Click:  AMPAC Total Score: 21    Treatment & Education:  Pt ed re OT role and POC. Pt performed supine to sit with S. Pt performed strength and ROM testing. Pt donned gown as robe and socks with SBA. Pt performed sit to stand t/f with SBA and no AD, and ambulated with CGA and no AD in hallway. Pt returned to the room and sat EOB.  Pt ed re safety, ambulation within the room, and therapy expectations. Pt ed re self care tasks and transfers (call for assistance with OOB activity, increased activity as tolerated)  Education:    Assessment:     Stanislaw Garcia is a 27 y.o. male with a medical diagnosis of Severe persistent asthma with status asthmaticus.  He presents with the following performance deficits affecting function are impaired endurance, gait instability, impaired self care skills.  Pt participates well and is motivated to regain functional independence. Pt is progressing well medically and demo  "improved function re mobility and self care since extubation. Pt would benefit from cont OT to address bathroom safety and home needs, but anticipate no home needs re therapy services.    Rehab Prognosis:  Good; patient would benefit from acute skilled OT services to address these deficits and reach maximum level of function.         Clinical Decision Makin.  OT Low:  "Pt evaluation falls under low complexity for evaluation coding due to performance deficits noted in 1-3 areas as stated above and 0 co-morbities affecting current functional status. Data obtained from problem focused assessments. No modifications or assistance was required for completion of evaluation. Only brief occupational profile and history review completed."     Plan:     Patient to be seen 2 x/week to address the above listed problems via self-care/home management, therapeutic activities  · Plan of Care Expires: 18  · Plan of Care Reviewed with: patient    This Plan of care has been discussed with the patient who was involved in its development and understands and is in agreement with the identified goals and treatment plan    GOALS:    Occupational Therapy Goals        Problem: Occupational Therapy Goal    Goal Priority Disciplines Outcome Interventions   Occupational Therapy Goal     OT, PT/OT Ongoing (interventions implemented as appropriate)    Description:  Goals to be met by: 3/15/18     Patient will increase functional independence with ADLs by performing:    LE Dressing with [place].  Toileting from toilet with [place] for hygiene and clothing management.   Stand pivot transfers with [place].  Tub or shower transfer with [place].                      Time Tracking:     OT Date of Treatment: 18  OT Start Time: 815  OT Stop Time: 833  OT Total Time (min): 18 min    Billable Minutes:Evaluation 10 minutes  Self Care/Home Management 8 minutes    CRYS Lynn  3/3/2018  Pager: [phone]    "

## 2020-03-04 NOTE — TREATMENT PLAN
Discussed with Hospital Medicine. Patient will be stepped down to floor.     Zaki Holloway MD   Internal Medicine PGY-1  [phone]     Altered structure/function GI tract

## 2021-05-31 ENCOUNTER — HOSPITAL ENCOUNTER (EMERGENCY)
Facility: HOSPITAL | Age: 31
Discharge: HOME | End: 2021-06-01
Attending: EMERGENCY MEDICINE
Payer: OTHER GOVERNMENT

## 2021-05-31 DIAGNOSIS — R53.83 FATIGUE: ICD-10-CM

## 2021-05-31 DIAGNOSIS — T50.905A ADVERSE EFFECT OF DRUG, INITIAL ENCOUNTER: Primary | ICD-10-CM

## 2021-05-31 LAB
ALBUMIN SERPL BCP-MCNC: 4.8 G/DL (ref 3.5–5.2)
ALP SERPL-CCNC: 62 U/L (ref 55–135)
ALT SERPL W/O P-5'-P-CCNC: 16 U/L (ref 10–44)
AMMONIA PLAS-SCNC: 24 UMOL/L (ref 10–50)
AMPHET+METHAMPHET UR QL: NORMAL
ANION GAP SERPL CALC-SCNC: 11 MMOL/L (ref 8–16)
APAP SERPL-MCNC: <3 UG/ML (ref 10–20)
AST SERPL-CCNC: 20 U/L (ref 10–40)
BARBITURATES UR QL SCN>200 NG/ML: NEGATIVE
BASOPHILS # BLD AUTO: 0.05 K/UL (ref 0–0.2)
BASOPHILS NFR BLD: 0.3 % (ref 0–1.9)
BENZODIAZ UR QL SCN>200 NG/ML: NEGATIVE
BILIRUB SERPL-MCNC: 1 MG/DL (ref 0.1–1)
BILIRUB UR QL STRIP: NEGATIVE
BUN SERPL-MCNC: 13 MG/DL (ref 6–20)
BZE UR QL SCN: NEGATIVE
CALCIUM SERPL-MCNC: 9.6 MG/DL (ref 8.7–10.5)
CANNABINOIDS UR QL SCN: NEGATIVE
CHLORIDE SERPL-SCNC: 107 MMOL/L (ref 95–110)
CK SERPL-CCNC: 461 U/L (ref 20–200)
CLARITY UR: CLEAR
CO2 SERPL-SCNC: 22 MMOL/L (ref 23–29)
COLOR UR: YELLOW
CREAT SERPL-MCNC: 1.3 MG/DL (ref 0.5–1.4)
CREAT UR-MCNC: 58.9 MG/DL (ref 23–375)
CTP QC/QA: YES
DIFFERENTIAL METHOD: ABNORMAL
EOSINOPHIL # BLD AUTO: 0 K/UL (ref 0–0.5)
EOSINOPHIL NFR BLD: 0.1 % (ref 0–8)
ERYTHROCYTE [DISTWIDTH] IN BLOOD BY AUTOMATED COUNT: 11.9 % (ref 11.5–14.5)
EST. GFR  (AFRICAN AMERICAN): >60 ML/MIN/1.73 M^2
EST. GFR  (NON AFRICAN AMERICAN): >60 ML/MIN/1.73 M^2
ETHANOL SERPL-MCNC: <10 MG/DL
GLUCOSE SERPL-MCNC: 100 MG/DL (ref 70–110)
GLUCOSE UR QL STRIP: ABNORMAL
HCT VFR BLD AUTO: 43.1 % (ref 40–54)
HGB BLD-MCNC: 15.4 G/DL (ref 14–18)
HGB UR QL STRIP: NEGATIVE
IMM GRANULOCYTES # BLD AUTO: 0.07 K/UL (ref 0–0.04)
IMM GRANULOCYTES NFR BLD AUTO: 0.4 % (ref 0–0.5)
KETONES UR QL STRIP: NEGATIVE
LEUKOCYTE ESTERASE UR QL STRIP: NEGATIVE
LYMPHOCYTES # BLD AUTO: 1.9 K/UL (ref 1–4.8)
LYMPHOCYTES NFR BLD: 12.5 % (ref 18–48)
MCH RBC QN AUTO: 30.1 PG (ref 27–31)
MCHC RBC AUTO-ENTMCNC: 35.7 G/DL (ref 32–36)
MCV RBC AUTO: 84 FL (ref 82–98)
METHADONE UR QL SCN>300 NG/ML: NEGATIVE
MONOCYTES # BLD AUTO: 1.1 K/UL (ref 0.3–1)
MONOCYTES NFR BLD: 6.8 % (ref 4–15)
NEUTROPHILS # BLD AUTO: 12.4 K/UL (ref 1.8–7.7)
NEUTROPHILS NFR BLD: 79.9 % (ref 38–73)
NITRITE UR QL STRIP: NEGATIVE
NRBC BLD-RTO: 0 /100 WBC
OPIATES UR QL SCN: NEGATIVE
PCP UR QL SCN>25 NG/ML: NEGATIVE
PH UR STRIP: 7 [PH] (ref 5–8)
PLATELET # BLD AUTO: 312 K/UL (ref 150–450)
PMV BLD AUTO: 10.2 FL (ref 9.2–12.9)
POTASSIUM SERPL-SCNC: 3.2 MMOL/L (ref 3.5–5.1)
PROT SERPL-MCNC: 7.5 G/DL (ref 6–8.4)
PROT UR QL STRIP: ABNORMAL
RBC # BLD AUTO: 5.11 M/UL (ref 4.6–6.2)
SARS-COV-2 RDRP RESP QL NAA+PROBE: NEGATIVE
SODIUM SERPL-SCNC: 140 MMOL/L (ref 136–145)
SP GR UR STRIP: 1.01 (ref 1–1.03)
TOXICOLOGY INFORMATION: NORMAL
TSH SERPL DL<=0.005 MIU/L-ACNC: 0.65 UIU/ML (ref 0.4–4)
URN SPEC COLLECT METH UR: ABNORMAL
UROBILINOGEN UR STRIP-ACNC: NEGATIVE EU/DL
WBC # BLD AUTO: 15.57 K/UL (ref 3.9–12.7)

## 2021-05-31 PROCEDURE — 82550 ASSAY OF CK (CPK): CPT | Performed by: EMERGENCY MEDICINE

## 2021-05-31 PROCEDURE — U0002 COVID-19 LAB TEST NON-CDC: HCPCS | Performed by: EMERGENCY MEDICINE

## 2021-05-31 PROCEDURE — 25000003 PHARM REV CODE 250: Performed by: EMERGENCY MEDICINE

## 2021-05-31 PROCEDURE — 82140 ASSAY OF AMMONIA: CPT | Performed by: EMERGENCY MEDICINE

## 2021-05-31 PROCEDURE — 99285 EMERGENCY DEPT VISIT HI MDM: CPT | Mod: 25

## 2021-05-31 PROCEDURE — 82077 ASSAY SPEC XCP UR&BREATH IA: CPT | Performed by: NURSE PRACTITIONER

## 2021-05-31 PROCEDURE — 80307 DRUG TEST PRSMV CHEM ANLYZR: CPT | Performed by: NURSE PRACTITIONER

## 2021-05-31 PROCEDURE — 85025 COMPLETE CBC W/AUTO DIFF WBC: CPT | Performed by: NURSE PRACTITIONER

## 2021-05-31 PROCEDURE — 96361 HYDRATE IV INFUSION ADD-ON: CPT

## 2021-05-31 PROCEDURE — 93010 ELECTROCARDIOGRAM REPORT: CPT | Mod: ,,, | Performed by: INTERNAL MEDICINE

## 2021-05-31 PROCEDURE — 81003 URINALYSIS AUTO W/O SCOPE: CPT | Mod: 59 | Performed by: NURSE PRACTITIONER

## 2021-05-31 PROCEDURE — 96374 THER/PROPH/DIAG INJ IV PUSH: CPT

## 2021-05-31 PROCEDURE — 93010 EKG 12-LEAD: ICD-10-PCS | Mod: ,,, | Performed by: INTERNAL MEDICINE

## 2021-05-31 PROCEDURE — 63600175 PHARM REV CODE 636 W HCPCS: Performed by: EMERGENCY MEDICINE

## 2021-05-31 PROCEDURE — 93005 ELECTROCARDIOGRAM TRACING: CPT

## 2021-05-31 PROCEDURE — 84443 ASSAY THYROID STIM HORMONE: CPT | Performed by: NURSE PRACTITIONER

## 2021-05-31 PROCEDURE — 80143 DRUG ASSAY ACETAMINOPHEN: CPT | Performed by: NURSE PRACTITIONER

## 2021-05-31 PROCEDURE — 80053 COMPREHEN METABOLIC PANEL: CPT | Performed by: NURSE PRACTITIONER

## 2021-05-31 RX ORDER — PROCHLORPERAZINE EDISYLATE 5 MG/ML
10 INJECTION INTRAMUSCULAR; INTRAVENOUS
Status: COMPLETED | OUTPATIENT
Start: 2021-05-31 | End: 2021-05-31

## 2021-05-31 RX ADMIN — PROCHLORPERAZINE EDISYLATE 10 MG: 5 INJECTION INTRAMUSCULAR; INTRAVENOUS at 09:05

## 2021-05-31 RX ADMIN — SODIUM CHLORIDE, SODIUM LACTATE, POTASSIUM CHLORIDE, AND CALCIUM CHLORIDE 1000 ML: .6; .31; .03; .02 INJECTION, SOLUTION INTRAVENOUS at 10:05

## 2021-05-31 RX ADMIN — POTASSIUM BICARBONATE 50 MEQ: 978 TABLET, EFFERVESCENT ORAL at 10:05

## 2021-05-31 RX ADMIN — SODIUM CHLORIDE 1000 ML: 0.9 INJECTION, SOLUTION INTRAVENOUS at 09:05

## 2021-06-01 VITALS
BODY MASS INDEX: 18.19 KG/M2 | HEART RATE: 88 BPM | SYSTOLIC BLOOD PRESSURE: 107 MMHG | TEMPERATURE: 98 F | OXYGEN SATURATION: 99 % | WEIGHT: 120 LBS | RESPIRATION RATE: 16 BRPM | DIASTOLIC BLOOD PRESSURE: 59 MMHG | HEIGHT: 68 IN

## 2021-06-01 LAB — POCT GLUCOSE: 174 MG/DL (ref 70–110)

## 2021-06-01 PROCEDURE — G0425 INPT/ED TELECONSULT30: HCPCS | Mod: 95,,, | Performed by: PSYCHIATRY & NEUROLOGY

## 2021-06-01 PROCEDURE — G0425 PR INPT TELEHEALTH CONSULT 30M: ICD-10-PCS | Mod: 95,,, | Performed by: PSYCHIATRY & NEUROLOGY

## 2021-06-01 RX ORDER — PROCHLORPERAZINE MALEATE 10 MG
10 TABLET ORAL EVERY 8 HOURS PRN
Qty: 14 TABLET | Refills: 0 | Status: SHIPPED | OUTPATIENT
Start: 2021-06-01

## 2021-06-02 ENCOUNTER — TELEPHONE (OUTPATIENT)
Dept: FAMILY MEDICINE | Facility: HOSPITAL | Age: 31
End: 2021-06-02

## 2021-06-19 NOTE — DISCHARGE INSTRUCTIONS
Our goal in the emergency department is to always give you outstanding care and exceptional service. You may receive a survey by mail or e-mail in the next week regarding your experience in our ED. We would greatly appreciate your completing and returning the survey. Your feedback provides us with a way to recognize our staff who give very good care and it helps us learn how to improve when your experience was below our aspiration of excellence.      Scribe Attestation (For Scribes USE Only)... Attending Attestation (For Attendings USE Only).../Scribe Attestation (For Scribes USE Only)...

## 2023-06-05 ENCOUNTER — APPOINTMENT (RX ONLY)
Dept: URBAN - METROPOLITAN AREA CLINIC 331 | Facility: CLINIC | Age: 33
Setting detail: DERMATOLOGY
End: 2023-06-05

## 2023-06-05 DIAGNOSIS — L70.0 ACNE VULGARIS: ICD-10-CM | Status: INADEQUATELY CONTROLLED

## 2023-06-05 PROCEDURE — ? ADDITIONAL NOTES

## 2023-06-05 PROCEDURE — ? PRESCRIPTION

## 2023-06-05 PROCEDURE — ? COUNSELING

## 2023-06-05 PROCEDURE — 99203 OFFICE O/P NEW LOW 30 MIN: CPT

## 2023-06-05 RX ORDER — CLINDAMYCIN PHOSPHATE 10 MG/G
GEL TOPICAL BID
Qty: 30 | Refills: 3 | Status: ERX | COMMUNITY
Start: 2023-06-05

## 2023-06-05 RX ORDER — TRETIONIN 0.25 MG/G
CREAM TOPICAL QHS
Qty: 45 | Refills: 3 | Status: ERX | COMMUNITY
Start: 2023-06-05

## 2023-06-05 RX ADMIN — CLINDAMYCIN PHOSPHATE: 10 GEL TOPICAL at 00:00

## 2023-06-05 RX ADMIN — TRETIONIN: 0.25 CREAM TOPICAL at 00:00

## 2023-06-05 ASSESSMENT — LOCATION SIMPLE DESCRIPTION DERM: LOCATION SIMPLE: NOSE

## 2023-06-05 ASSESSMENT — LOCATION ZONE DERM: LOCATION ZONE: NOSE

## 2023-06-05 ASSESSMENT — LOCATION DETAILED DESCRIPTION DERM: LOCATION DETAILED: LEFT NASAL DORSUM

## 2023-06-05 NOTE — PROCEDURE: ADDITIONAL NOTES
Render Risk Assessment In Note?: no
Additional Notes: Patients main concern was a lesion on his nose. It was extracted today in office. Patient was instructed to use clindamycin, once daily and tretinoin at bedtime. 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 F/u on 6-8 weeks. Plan to focus more on PIH at that time.
Detail Level: Simple

## 2023-06-05 NOTE — HPI: SKIN LESION
What Type Of Note Output Would You Prefer (Optional)?: Bullet Format
Is This A New Presentation, Or A Follow-Up?: Skin Lesion
Additional History: Poss due to acne

## 2023-06-05 NOTE — PROCEDURE: COUNSELING
Tetracycline Counseling: Patient counseled regarding possible photosensitivity and increased risk for sunburn.  Patient instructed to avoid sunlight, if possible.  When exposed to sunlight, patients should wear protective clothing, sunglasses, and sunscreen.  The patient was instructed to call the office immediately if the following severe adverse effects occur:  hearing changes, easy bruising/bleeding, severe headache, or vision changes.  The patient verbalized understanding of the proper use and possible adverse effects of tetracycline.  All of the patient's questions and concerns were addressed. Patient understands to avoid pregnancy while on therapy due to potential birth defects.
Topical Retinoid Pregnancy And Lactation Text: This medication is Pregnancy Category C. It is unknown if this medication is excreted in breast milk.
High Dose Vitamin A Pregnancy And Lactation Text: High dose vitamin A therapy is contraindicated during pregnancy and breast feeding.
Isotretinoin Pregnancy And Lactation Text: This medication is Pregnancy Category X and is considered extremely dangerous during pregnancy. It is unknown if it is excreted in breast milk.
Dapsone Counseling: I discussed with the patient the risks of dapsone including but not limited to hemolytic anemia, agranulocytosis, rashes, methemoglobinemia, kidney failure, peripheral neuropathy, headaches, GI upset, and liver toxicity.  Patients who start dapsone require monitoring including baseline LFTs and weekly CBCs for the first month, then every month thereafter.  The patient verbalized understanding of the proper use and possible adverse effects of dapsone.  All of the patient's questions and concerns were addressed.
Tazorac Pregnancy And Lactation Text: This medication is not safe during pregnancy. It is unknown if this medication is excreted in breast milk.
Doxycycline Counseling:  Patient counseled regarding possible photosensitivity and increased risk for sunburn.  Patient instructed to avoid sunlight, if possible.  When exposed to sunlight, patients should wear protective clothing, sunglasses, and sunscreen.  The patient was instructed to call the office immediately if the following severe adverse effects occur:  hearing changes, easy bruising/bleeding, severe headache, or vision changes.  The patient verbalized understanding of the proper use and possible adverse effects of doxycycline.  All of the patient's questions and concerns were addressed.
Topical Clindamycin Pregnancy And Lactation Text: This medication is Pregnancy Category B and is considered safe during pregnancy. It is unknown if it is excreted in breast milk.
Azithromycin Pregnancy And Lactation Text: This medication is considered safe during pregnancy and is also secreted in breast milk.
Aklief counseling:  Patient advised to apply a pea-sized amount only at bedtime and wait 30 minutes after washing their face before applying.  If too drying, patient may add a non-comedogenic moisturizer.  The most commonly reported side effects including irritation, redness, scaling, dryness, stinging, burning, itching, and increased risk of sunburn.  The patient verbalized understanding of the proper use and possible adverse effects of retinoids.  All of the patient's questions and concerns were addressed.
Azelaic Acid Pregnancy And Lactation Text: This medication is considered safe during pregnancy and breast feeding.
Erythromycin Pregnancy And Lactation Text: This medication is Pregnancy Category B and is considered safe during pregnancy. It is also excreted in breast milk.
Bactrim Counseling:  I discussed with the patient the risks of sulfa antibiotics including but not limited to GI upset, allergic reaction, drug rash, diarrhea, dizziness, photosensitivity, and yeast infections.  Rarely, more serious reactions can occur including but not limited to aplastic anemia, agranulocytosis, methemoglobinemia, blood dyscrasias, liver or kidney failure, lung infiltrates or desquamative/blistering drug rashes.
Topical Sulfur Applications Counseling: Topical Sulfur Counseling: Patient counseled that this medication may cause skin irritation or allergic reactions.  In the event of skin irritation, the patient was advised to reduce the amount of the drug applied or use it less frequently.   The patient verbalized understanding of the proper use and possible adverse effects of topical sulfur application.  All of the patient's questions and concerns were addressed.
Doxycycline Pregnancy And Lactation Text: This medication is Pregnancy Category D and not consider safe during pregnancy. It is also excreted in breast milk but is considered safe for shorter treatment courses.
Spironolactone Counseling: Patient advised regarding risks of diarrhea, abdominal pain, hyperkalemia, birth defects (for female patients), liver toxicity and renal toxicity. The patient may need blood work to monitor liver and kidney function and potassium levels while on therapy. The patient verbalized understanding of the proper use and possible adverse effects of spironolactone.  All of the patient's questions and concerns were addressed.
Benzoyl Peroxide Pregnancy And Lactation Text: This medication is Pregnancy Category C. It is unknown if benzoyl peroxide is excreted in breast milk.
Birth Control Pills Counseling: Birth Control Pill Counseling: I discussed with the patient the potential side effects of OCPs including but not limited to increased risk of stroke, heart attack, thrombophlebitis, deep venous thrombosis, hepatic adenomas, breast changes, GI upset, headaches, and depression.  The patient verbalized understanding of the proper use and possible adverse effects of OCPs. All of the patient's questions and concerns were addressed.
Winlevi Counseling:  I discussed with the patient the risks of topical clascoterone including but not limited to erythema, scaling, itching, and stinging. Patient voiced their understanding.
Sarecycline Counseling: Patient advised regarding possible photosensitivity and discoloration of the teeth, skin, lips, tongue and gums.  Patient instructed to avoid sunlight, if possible.  When exposed to sunlight, patients should wear protective clothing, sunglasses, and sunscreen.  The patient was instructed to call the office immediately if the following severe adverse effects occur:  hearing changes, easy bruising/bleeding, severe headache, or vision changes.  The patient verbalized understanding of the proper use and possible adverse effects of sarecycline.  All of the patient's questions and concerns were addressed.
Topical Retinoid counseling:  Patient advised to apply a pea-sized amount only at bedtime and wait 30 minutes after washing their face before applying.  If too drying, patient may add a non-comedogenic moisturizer. The patient verbalized understanding of the proper use and possible adverse effects of retinoids.  All of the patient's questions and concerns were addressed.
Azithromycin Counseling:  I discussed with the patient the risks of azithromycin including but not limited to GI upset, allergic reaction, drug rash, diarrhea, and yeast infections.
High Dose Vitamin A Counseling: Side effects reviewed, pt to contact office should one occur.
Spironolactone Pregnancy And Lactation Text: This medication can cause feminization of the male fetus and should be avoided during pregnancy. The active metabolite is also found in breast milk.
Minocycline Counseling: Patient advised regarding possible photosensitivity and discoloration of the teeth, skin, lips, tongue and gums.  Patient instructed to avoid sunlight, if possible.  When exposed to sunlight, patients should wear protective clothing, sunglasses, and sunscreen.  The patient was instructed to call the office immediately if the following severe adverse effects occur:  hearing changes, easy bruising/bleeding, severe headache, or vision changes.  The patient verbalized understanding of the proper use and possible adverse effects of minocycline.  All of the patient's questions and concerns were addressed.
Aklief Pregnancy And Lactation Text: It is unknown if this medication is safe to use during pregnancy.  It is unknown if this medication is excreted in breast milk.  Breastfeeding women should use the topical cream on the smallest area of the skin for the shortest time needed while breastfeeding.  Do not apply to nipple and areola.
Tazorac Counseling:  Patient advised that medication is irritating and drying.  Patient may need to apply sparingly and wash off after an hour before eventually leaving it on overnight.  The patient verbalized understanding of the proper use and possible adverse effects of tazorac.  All of the patient's questions and concerns were addressed.
Topical Clindamycin Counseling: Patient counseled that this medication may cause skin irritation or allergic reactions.  In the event of skin irritation, the patient was advised to reduce the amount of the drug applied or use it less frequently.   The patient verbalized understanding of the proper use and possible adverse effects of clindamycin.  All of the patient's questions and concerns were addressed.
Dapsone Pregnancy And Lactation Text: This medication is Pregnancy Category C and is not considered safe during pregnancy or breast feeding.
Tetracycline Pregnancy And Lactation Text: This medication is Pregnancy Category D and not consider safe during pregnancy. It is also excreted in breast milk.
Azelaic Acid Counseling: Patient counseled that medicine may cause skin irritation and to avoid applying near the eyes.  In the event of skin irritation, the patient was advised to reduce the amount of the drug applied or use it less frequently.   The patient verbalized understanding of the proper use and possible adverse effects of azelaic acid.  All of the patient's questions and concerns were addressed.
Include Pregnancy/Lactation Warning?: No
Erythromycin Counseling:  I discussed with the patient the risks of erythromycin including but not limited to GI upset, allergic reaction, drug rash, diarrhea, increase in liver enzymes, and yeast infections.
Isotretinoin Counseling: Patient should get monthly blood tests, not donate blood, not drive at night if vision affected, not share medication, and not undergo elective surgery for 6 months after tx completed. Side effects reviewed, pt to contact office should one occur.
Winlevi Pregnancy And Lactation Text: This medication is considered safe during pregnancy and breastfeeding.
Birth Control Pills Pregnancy And Lactation Text: This medication should be avoided if pregnant and for the first 30 days post-partum.
Detail Level: Detailed
Topical Sulfur Applications Pregnancy And Lactation Text: This medication is Pregnancy Category C and has an unknown safety profile during pregnancy. It is unknown if this topical medication is excreted in breast milk.
Bactrim Pregnancy And Lactation Text: This medication is Pregnancy Category D and is known to cause fetal risk.  It is also excreted in breast milk.
Benzoyl Peroxide Counseling: Patient counseled that medicine may cause skin irritation and bleach clothing.  In the event of skin irritation, the patient was advised to reduce the amount of the drug applied or use it less frequently.   The patient verbalized understanding of the proper use and possible adverse effects of benzoyl peroxide.  All of the patient's questions and concerns were addressed.

## 2023-07-20 ENCOUNTER — APPOINTMENT (RX ONLY)
Dept: URBAN - METROPOLITAN AREA CLINIC 331 | Facility: CLINIC | Age: 33
Setting detail: DERMATOLOGY
End: 2023-07-20

## 2023-07-20 DIAGNOSIS — L70.0 ACNE VULGARIS: ICD-10-CM

## 2023-07-20 PROCEDURE — ? PRESCRIPTION

## 2023-07-20 PROCEDURE — ? PRESCRIPTION MEDICATION MANAGEMENT

## 2023-07-20 PROCEDURE — 99213 OFFICE O/P EST LOW 20 MIN: CPT

## 2023-07-20 PROCEDURE — ? COUNSELING

## 2023-07-20 PROCEDURE — ? ADDITIONAL NOTES

## 2023-07-20 RX ORDER — TRETIONIN 0.5 MG/G
CREAM TOPICAL QHS
Qty: 45 | Refills: 3 | Status: ERX | COMMUNITY
Start: 2023-07-20

## 2023-07-20 RX ADMIN — TRETIONIN: 0.5 CREAM TOPICAL at 00:00

## 2023-07-20 ASSESSMENT — LOCATION DETAILED DESCRIPTION DERM: LOCATION DETAILED: LEFT NASAL DORSUM

## 2023-07-20 ASSESSMENT — LOCATION ZONE DERM: LOCATION ZONE: NOSE

## 2023-07-20 ASSESSMENT — LOCATION SIMPLE DESCRIPTION DERM: LOCATION SIMPLE: NOSE

## 2023-07-20 NOTE — PROCEDURE: ADDITIONAL NOTES
Render Risk Assessment In Note?: no
Additional Notes: Patients main concern was a lesion on his nose. It was extracted today in office. Patient was instructed to use clindamycin, once daily and tretinoin at bedtime. 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 F/u on 6-8 weeks. Plan to focus more on PIH at that time.\\n\\nPlan to increase strength of tretinoin to help with PIH.
Detail Level: Simple

## 2023-07-20 NOTE — PROCEDURE: PRESCRIPTION MEDICATION MANAGEMENT
Detail Level: Zone
Continue Regimen: clindamycin 1 % topical gel Bid\\nSig: Apply 1-2 times a day to acne as needed.
Render In Strict Bullet Format?: No
